# Patient Record
Sex: FEMALE | Race: WHITE | Employment: FULL TIME | URBAN - METROPOLITAN AREA
[De-identification: names, ages, dates, MRNs, and addresses within clinical notes are randomized per-mention and may not be internally consistent; named-entity substitution may affect disease eponyms.]

---

## 2016-01-11 LAB
CHOLEST SERPL-MCNC: 184 MG/DL
HDLC SERPL-MCNC: 37 MG/DL
LDLC SERPL CALC-MCNC: 121 MG/DL
TRIGL SERPL-MCNC: 131 MG/DL
TSH SERPL-ACNC: 3.18 (ref 0.27–4.2)

## 2016-07-31 LAB
ALT SERPL-CCNC: 10 U/L (ref 7–45)
AST SERPL-CCNC: 17 U/L (ref 8–43)
CREAT SERPL-MCNC: 0.4 MG/DL (ref 0.6–1.1)
GFR SERPL CREATININE-BSD FRML MDRD: >60 ML/MIN/1.73M2

## 2016-09-06 LAB
ALT SERPL-CCNC: 10 U/L (ref 7–45)
AST SERPL-CCNC: 16 U/L (ref 8–43)
CREAT SERPL-MCNC: 0.4 MG/DL (ref 0.6–1.1)
GFR SERPL CREATININE-BSD FRML MDRD: >60 ML/MIN/1.73M2

## 2016-10-04 LAB
ALT SERPL-CCNC: 11 U/L (ref 7–45)
AST SERPL-CCNC: 15 U/L (ref 8–43)
CREAT SERPL-MCNC: 0.5 MG/DL (ref 0.6–1.1)
GFR SERPL CREATININE-BSD FRML MDRD: >60 ML/MIN/1.73M2

## 2016-11-25 LAB
ALT SERPL-CCNC: 11 U/L (ref 7–45)
AST SERPL-CCNC: 34 U/L (ref 8–43)
CREAT SERPL-MCNC: 0.5 MG/DL (ref 0.6–1.1)
GFR SERPL CREATININE-BSD FRML MDRD: >60 ML/MIN/1.73M2

## 2017-01-10 ENCOUNTER — TRANSFERRED RECORDS (OUTPATIENT)
Dept: HEALTH INFORMATION MANAGEMENT | Facility: CLINIC | Age: 27
End: 2017-01-10

## 2017-06-12 LAB
ALT SERPL-CCNC: 10 U/L (ref 6–29)
AST SERPL-CCNC: 13 U/L (ref 10–30)
CREAT SERPL-MCNC: 0.64 MG/DL (ref 0.5–1.1)
GFR SERPL CREATININE-BSD FRML MDRD: 123 ML/MIN/1.73M2
GLUCOSE SERPL-MCNC: 83 MG/DL (ref 65–99)
POTASSIUM SERPL-SCNC: 4.5 MMOL/L (ref 3.5–5.3)

## 2017-08-04 ENCOUNTER — TRANSFERRED RECORDS (OUTPATIENT)
Dept: HEALTH INFORMATION MANAGEMENT | Facility: CLINIC | Age: 27
End: 2017-08-04

## 2017-08-04 LAB — PAP-ABSTRACT: NORMAL

## 2017-08-06 ENCOUNTER — TRANSFERRED RECORDS (OUTPATIENT)
Dept: HEALTH INFORMATION MANAGEMENT | Facility: CLINIC | Age: 27
End: 2017-08-06

## 2017-08-07 ENCOUNTER — TRANSFERRED RECORDS (OUTPATIENT)
Dept: HEALTH INFORMATION MANAGEMENT | Facility: CLINIC | Age: 27
End: 2017-08-07

## 2018-01-31 ENCOUNTER — TRANSFERRED RECORDS (OUTPATIENT)
Dept: HEALTH INFORMATION MANAGEMENT | Facility: CLINIC | Age: 28
End: 2018-01-31

## 2018-01-31 LAB
CREAT SERPL-MCNC: 0.63 MG/DL (ref 0.5–1.1)
GFR SERPL CREATININE-BSD FRML MDRD: 124 ML/MIN/1.73M2
GLUCOSE SERPL-MCNC: 84 MG/DL (ref 65–99)
POTASSIUM SERPL-SCNC: 4.7 MMOL/L (ref 3.5–5.3)

## 2018-04-03 ENCOUNTER — TRANSFERRED RECORDS (OUTPATIENT)
Dept: HEALTH INFORMATION MANAGEMENT | Facility: CLINIC | Age: 28
End: 2018-04-03

## 2018-04-03 LAB — TSH SERPL-ACNC: 1.41 MIU/L (ref 0.4–4.5)

## 2018-10-12 ENCOUNTER — OFFICE VISIT (OUTPATIENT)
Dept: FAMILY MEDICINE | Facility: CLINIC | Age: 28
End: 2018-10-12
Payer: COMMERCIAL

## 2018-10-12 VITALS
HEART RATE: 100 BPM | OXYGEN SATURATION: 97 % | TEMPERATURE: 97.6 F | SYSTOLIC BLOOD PRESSURE: 118 MMHG | RESPIRATION RATE: 24 BRPM | HEIGHT: 67 IN | DIASTOLIC BLOOD PRESSURE: 70 MMHG | WEIGHT: 228.4 LBS | BODY MASS INDEX: 35.85 KG/M2

## 2018-10-12 DIAGNOSIS — S93.401D SPRAIN OF RIGHT ANKLE, UNSPECIFIED LIGAMENT, SUBSEQUENT ENCOUNTER: ICD-10-CM

## 2018-10-12 DIAGNOSIS — E66.01 MORBID OBESITY (H): ICD-10-CM

## 2018-10-12 DIAGNOSIS — F41.9 ANXIETY: ICD-10-CM

## 2018-10-12 DIAGNOSIS — I10 HYPERTENSION, GOAL BELOW 140/90: Primary | ICD-10-CM

## 2018-10-12 PROCEDURE — 99203 OFFICE O/P NEW LOW 30 MIN: CPT | Performed by: NURSE PRACTITIONER

## 2018-10-12 RX ORDER — ESCITALOPRAM OXALATE 10 MG/1
10 TABLET ORAL DAILY
Qty: 90 TABLET | Refills: 1 | Status: SHIPPED | OUTPATIENT
Start: 2018-10-12 | End: 2019-03-05

## 2018-10-12 RX ORDER — LEVONORGESTREL/ETHIN.ESTRADIOL 0.1-0.02MG
TABLET ORAL
COMMUNITY
Start: 2018-04-20 | End: 2019-08-07

## 2018-10-12 RX ORDER — HYDROCHLOROTHIAZIDE 25 MG/1
25 TABLET ORAL DAILY
COMMUNITY
Start: 2017-08-04 | End: 2018-10-12

## 2018-10-12 RX ORDER — HYDROCHLOROTHIAZIDE 25 MG/1
25 TABLET ORAL DAILY
Qty: 90 TABLET | Refills: 1 | Status: SHIPPED | OUTPATIENT
Start: 2018-10-12 | End: 2019-03-05

## 2018-10-12 RX ORDER — ESCITALOPRAM OXALATE 20 MG/1
20 TABLET ORAL DAILY
COMMUNITY
Start: 2017-08-04 | End: 2018-10-12 | Stop reason: DRUGHIGH

## 2018-10-12 ASSESSMENT — ANXIETY QUESTIONNAIRES
5. BEING SO RESTLESS THAT IT IS HARD TO SIT STILL: SEVERAL DAYS
2. NOT BEING ABLE TO STOP OR CONTROL WORRYING: MORE THAN HALF THE DAYS
GAD7 TOTAL SCORE: 10
7. FEELING AFRAID AS IF SOMETHING AWFUL MIGHT HAPPEN: SEVERAL DAYS
6. BECOMING EASILY ANNOYED OR IRRITABLE: MORE THAN HALF THE DAYS
1. FEELING NERVOUS, ANXIOUS, OR ON EDGE: SEVERAL DAYS
IF YOU CHECKED OFF ANY PROBLEMS ON THIS QUESTIONNAIRE, HOW DIFFICULT HAVE THESE PROBLEMS MADE IT FOR YOU TO DO YOUR WORK, TAKE CARE OF THINGS AT HOME, OR GET ALONG WITH OTHER PEOPLE: SOMEWHAT DIFFICULT
3. WORRYING TOO MUCH ABOUT DIFFERENT THINGS: MORE THAN HALF THE DAYS

## 2018-10-12 ASSESSMENT — PATIENT HEALTH QUESTIONNAIRE - PHQ9: 5. POOR APPETITE OR OVEREATING: SEVERAL DAYS

## 2018-10-12 ASSESSMENT — PAIN SCALES - GENERAL: PAINLEVEL: NO PAIN (0)

## 2018-10-12 NOTE — Clinical Note
Please abstract the following data from this visit with this patient into the appropriate field in Epic:  Pap smear done on this date: 2016 (approximately), by this group: [facility], results were normal.

## 2018-10-12 NOTE — PROGRESS NOTES
SUBJECTIVE:   Marie Yeboah is a 28 year old female who presents to clinic today for the following health issues:      New Patient/Transfer of Care  Medication Followup of lexapro, HCTZ    Taking Medication as prescribed: Lexapro is taking 1/2 tablet (10mg) seems to help better. Noticing that with full tablet she experienced heart palpatations. hydrochlorothiazide doing well on    Side Effects:  See above    Medication Helping Symptoms:  yes       The patient is here today to establish care.  She has previously been seen at the [facility], is having medical records transferred here.  She was initially prescribed Lexapro 20 mg daily, but it caused heart palpitations.  She has only been taking half a tablet, states it works well for her anxiety management and she has no side effects.  She is taking hydrochlorothiazide for management of blood pressure and this works well.  She denies chest pain or palpitations, has had no problems with swelling of the feet or ankles  She believes her health maintenance is up-to-date, had a Pap smear last year  She stepped in a hole couple weeks ago and twisted her right ankle.  She keeps wrapped, still has some pain along the medial aspect of the midfoot.  She did have this evaluated in clinic, was negative for fracture    Problem list and histories reviewed & adjusted, as indicated.  Additional history: as documented    BP Readings from Last 3 Encounters:   10/12/18 118/70   10/31/01 100/60   08/04/01 90/60    Wt Readings from Last 3 Encounters:   10/12/18 228 lb 6.4 oz (103.6 kg)   08/20/02 90 lb (40.8 kg) (57 %)*   01/14/02 81 lb 8 oz (37 kg) (52 %)*     * Growth percentiles are based on CDC 2-20 Years data.                    Reviewed and updated as needed this visit by clinical staff  Tobacco  Allergies  Surg Hx  Fam Hx  Soc Hx      Reviewed and updated as needed this visit by Provider         ROS:  Constitutional, HEENT, cardiovascular, pulmonary, gi and gu systems are  "negative, except as otherwise noted.    OBJECTIVE:     /70  Pulse 100  Temp 97.6  F (36.4  C) (Temporal)  Resp 24  Ht 5' 7\" (1.702 m)  Wt 228 lb 6.4 oz (103.6 kg)  LMP 10/08/2018  SpO2 97%  BMI 35.77 kg/m2  Body mass index is 35.77 kg/(m^2).   GENERAL: healthy, alert and no distress  NECK: no adenopathy, no asymmetry, masses, or scars and thyroid normal to palpation  RESP: lungs clear to auscultation - no rales, rhonchi or wheezes  CV: regular rate and rhythm, normal S1 S2, no S3 or S4, no murmur, click or rub, no peripheral edema and peripheral pulses strong  ABDOMEN: soft, nontender, no hepatosplenomegaly, no masses and bowel sounds normal  MS: Right ankle and foot appear normal to visual inspection.  No edema or discoloration.  No malleoli or tenderness.  Full range of motion and strength of the joint.  He has tenderness in the soft tissue inferior to the medial malleolus    Potassium and creatinine were checked in January, both are within normal  limits    ASSESSMENT/PLAN:     Problem List Items Addressed This Visit        Medium    Hypertension, goal below 140/90 - Primary    Relevant Medications    hydrochlorothiazide (HYDRODIURIL) 25 MG tablet    Obesity (BMI 35.0-39.9) with comorbidity (H)      Other Visit Diagnoses     Anxiety        Relevant Medications    escitalopram (LEXAPRO) 10 MG tablet    Sprain of right ankle, unspecified ligament, subsequent encounter               Prescriptions refilled for Lexapro 10 mg and HCTZ 25 mg daily.  She would be due for lab work in January, will return to clinic at that time for recheck  Discussed alternating warm water soaks with application of ice to the ankle to promote healing.  Gentle flexibility exercises recommended.    KENDAL Bowen [facility]  "

## 2018-10-12 NOTE — MR AVS SNAPSHOT
"              After Visit Summary   10/12/2018    Marie Yeboah    MRN: [id number]           Patient Information     Date Of Birth          1990        Visit Information        Provider Department      10/12/2018 3:45 PM Jo Araujo APRN CNP [facility]        Today's Diagnoses     Hypertension, goal below 140/90    -  1    Anxiety        Morbid obesity (H)        Sprain of right ankle, unspecified ligament, subsequent encounter           Follow-ups after your visit        Who to contact     If you have questions or need follow up information about today's clinic visit or your schedule please contact [facility] directly at [phone].  Normal or non-critical lab and imaging results will be communicated to you by [employer], letter or phone within 4 business days after the clinic has received the results. If you do not hear from us within 7 days, please contact the clinic through [employer] or phone. If you have a critical or abnormal lab result, we will notify you by phone as soon as possible.  Submit refill requests through [employer] or call your pharmacy and they will forward the refill request to us. Please allow 3 business days for your refill to be completed.          Additional Information About Your Visit        MyChart Information     [employer] lets you send messages to your doctor, view your test results, renew your prescriptions, schedule appointments and more. To sign up, go to [email or web address] . Click on \"Log in\" on the left side of the screen, which will take you to the Welcome page. Then click on \"Sign up Now\" on the right side of the page.     You will be asked to enter the access code listed below, as well as some personal information. Please follow the directions to create your username and password.     Your access code is: [email or web address]  Expires: 1/10/2019  3:25 PM     Your access code will  in 90 days. If you need help or a new code, please call " "your [place] clinic or [phone].        Care EveryWhere ID     This is your Care EveryWhere ID. This could be used by other organizations to access your [place] medical records  [id number]        Your Vitals Were     Pulse Temperature Respirations Height Last Period Pulse Oximetry    100 97.6  F (36.4  C) (Temporal) 24 5' 7\" (1.702 m) 10/08/2018 97%    BMI (Body Mass Index)                   35.77 kg/m2            Blood Pressure from Last 3 Encounters:   10/12/18 118/70   10/31/01 100/60   08/04/01 90/60    Weight from Last 3 Encounters:   10/12/18 228 lb 6.4 oz (103.6 kg)   08/20/02 90 lb (40.8 kg) (57 %)*   01/14/02 81 lb 8 oz (37 kg) (52 %)*     * Growth percentiles are based on [facility] 2-20 Years data.              Today, you had the following     No orders found for display         Today's Medication Changes          These changes are accurate as of 10/12/18  4:20 PM.  If you have any questions, ask your nurse or doctor.               These medicines have changed or have updated prescriptions.        Dose/Directions    escitalopram 10 MG tablet   Commonly known as:  LEXAPRO   This may have changed:    - medication strength  - how much to take  - additional instructions   Used for:  Anxiety   Changed by:  Jo Araujo APRN CNP        Dose:  10 mg   Take 1 tablet (10 mg) by mouth daily   Quantity:  90 tablet   Refills:  1            Where to get your medicines      These medications were sent to [facility] [address] [place] [address] [address]  [address] [place] [address]     Phone:  [phone]     escitalopram 10 MG tablet    hydrochlorothiazide 25 MG tablet                Primary Care Provider Office Phone # Fax #    KENDAL Bowen [facility] [phone]       [address] [place] [address]        Equal Access to Services     ADRIAN GARDNER AH: Stephanie suazoo Soomaali, waaxda luqadaha, qaybta kaalmada adeegyada, alen brito. So wa " [phone].    ATENCIÓN: Si jodi castano, tiene a carlos disposición servicios gratuitos de asistencia lingüística. Krissy sosa [phone].    We comply with applicable federal civil rights laws and [place] laws. We do not discriminate on the basis of race, color, national origin, age, disability, sex, sexual orientation, or gender identity.            Thank you!     Thank you for choosing [facility]  for your care. Our goal is always to provide you with excellent care. Hearing back from our patients is one way we can continue to improve our services. Please take a few minutes to complete the written survey that you may receive in the mail after your visit with us. Thank you!             Your Updated Medication List - Protect others around you: Learn how to safely use, store and throw away your medicines at www.disposemymeds.org.          This list is accurate as of 10/12/18  4:20 PM.  Always use your most recent med list.                   Brand Name Dispense Instructions for use Diagnosis    escitalopram 10 MG tablet    LEXAPRO    90 tablet    Take 1 tablet (10 mg) by mouth daily    Anxiety       hydrochlorothiazide 25 MG tablet    HYDRODIURIL    90 tablet    Take 1 tablet (25 mg) by mouth daily    Hypertension, goal below 140/90       levonorgestrel-ethinyl estradiol 0.1-20 MG-MCG per tablet    DANITA CRYSTAL LESSINA     Take 1 tablet by mouth daily.

## 2018-10-13 ASSESSMENT — ANXIETY QUESTIONNAIRES: GAD7 TOTAL SCORE: 10

## 2018-10-14 ENCOUNTER — HEALTH MAINTENANCE LETTER (OUTPATIENT)
Age: 28
End: 2018-10-14

## 2018-10-23 ENCOUNTER — TELEPHONE (OUTPATIENT)
Dept: FAMILY MEDICINE | Facility: CLINIC | Age: 28
End: 2018-10-23

## 2018-10-23 NOTE — TELEPHONE ENCOUNTER
Please abstract the following data from this visit with this patient into the appropriate field in Epic:    Pap smear done on this date: 8/6/17 (approximately), by this group: [facility], results were neg, normal.

## 2019-02-19 ENCOUNTER — MYC MEDICAL ADVICE (OUTPATIENT)
Dept: FAMILY MEDICINE | Facility: CLINIC | Age: 29
End: 2019-02-19

## 2019-03-05 ENCOUNTER — OFFICE VISIT (OUTPATIENT)
Dept: FAMILY MEDICINE | Facility: CLINIC | Age: 29
End: 2019-03-05
Payer: COMMERCIAL

## 2019-03-05 VITALS
OXYGEN SATURATION: 97 % | DIASTOLIC BLOOD PRESSURE: 86 MMHG | TEMPERATURE: 97.9 F | RESPIRATION RATE: 18 BRPM | BODY MASS INDEX: 38.66 KG/M2 | HEART RATE: 100 BPM | SYSTOLIC BLOOD PRESSURE: 126 MMHG | WEIGHT: 246.3 LBS | HEIGHT: 67 IN

## 2019-03-05 DIAGNOSIS — I10 HYPERTENSION, GOAL BELOW 140/90: ICD-10-CM

## 2019-03-05 DIAGNOSIS — J06.9 VIRAL UPPER RESPIRATORY TRACT INFECTION: Primary | ICD-10-CM

## 2019-03-05 DIAGNOSIS — F41.9 ANXIETY: ICD-10-CM

## 2019-03-05 LAB
ANION GAP SERPL CALCULATED.3IONS-SCNC: 6 MMOL/L (ref 3–14)
BUN SERPL-MCNC: 21 MG/DL (ref 7–30)
CALCIUM SERPL-MCNC: 8.9 MG/DL (ref 8.5–10.1)
CHLORIDE SERPL-SCNC: 103 MMOL/L (ref 94–109)
CO2 SERPL-SCNC: 30 MMOL/L (ref 20–32)
CREAT SERPL-MCNC: 0.9 MG/DL (ref 0.52–1.04)
GFR SERPL CREATININE-BSD FRML MDRD: 86 ML/MIN/{1.73_M2}
GLUCOSE SERPL-MCNC: 92 MG/DL (ref 70–99)
POTASSIUM SERPL-SCNC: 3.6 MMOL/L (ref 3.4–5.3)
SODIUM SERPL-SCNC: 139 MMOL/L (ref 133–144)

## 2019-03-05 PROCEDURE — 99214 OFFICE O/P EST MOD 30 MIN: CPT | Performed by: NURSE PRACTITIONER

## 2019-03-05 PROCEDURE — 80048 BASIC METABOLIC PNL TOTAL CA: CPT | Performed by: NURSE PRACTITIONER

## 2019-03-05 PROCEDURE — 36415 COLL VENOUS BLD VENIPUNCTURE: CPT | Performed by: NURSE PRACTITIONER

## 2019-03-05 RX ORDER — ESCITALOPRAM OXALATE 10 MG/1
10 TABLET ORAL DAILY
Qty: 90 TABLET | Refills: 1 | Status: SHIPPED | OUTPATIENT
Start: 2019-03-05 | End: 2019-10-10 | Stop reason: DRUGHIGH

## 2019-03-05 RX ORDER — HYDROCHLOROTHIAZIDE 25 MG/1
25 TABLET ORAL DAILY
Qty: 90 TABLET | Refills: 1 | Status: SHIPPED | OUTPATIENT
Start: 2019-03-05 | End: 2019-10-10

## 2019-03-05 ASSESSMENT — ANXIETY QUESTIONNAIRES
GAD7 TOTAL SCORE: 11
6. BECOMING EASILY ANNOYED OR IRRITABLE: MORE THAN HALF THE DAYS
2. NOT BEING ABLE TO STOP OR CONTROL WORRYING: MORE THAN HALF THE DAYS
IF YOU CHECKED OFF ANY PROBLEMS ON THIS QUESTIONNAIRE, HOW DIFFICULT HAVE THESE PROBLEMS MADE IT FOR YOU TO DO YOUR WORK, TAKE CARE OF THINGS AT HOME, OR GET ALONG WITH OTHER PEOPLE: SOMEWHAT DIFFICULT
5. BEING SO RESTLESS THAT IT IS HARD TO SIT STILL: SEVERAL DAYS
7. FEELING AFRAID AS IF SOMETHING AWFUL MIGHT HAPPEN: MORE THAN HALF THE DAYS
1. FEELING NERVOUS, ANXIOUS, OR ON EDGE: SEVERAL DAYS
3. WORRYING TOO MUCH ABOUT DIFFERENT THINGS: MORE THAN HALF THE DAYS

## 2019-03-05 ASSESSMENT — PATIENT HEALTH QUESTIONNAIRE - PHQ9: 5. POOR APPETITE OR OVEREATING: SEVERAL DAYS

## 2019-03-05 ASSESSMENT — PAIN SCALES - GENERAL: PAINLEVEL: NO PAIN (0)

## 2019-03-05 ASSESSMENT — MIFFLIN-ST. JEOR: SCORE: 1879.84

## 2019-03-05 NOTE — PROGRESS NOTES
"  SUBJECTIVE:   Marie Yeboah is a 29 year old female who presents to clinic today for the following health issues:      Medication Followup of Lexapro, htcz    Taking Medication as prescribed: yes    Side Effects:  None    Medication Helping Symptoms:  yes       She is taking Lexapro 10 mg daily.  She denies symptoms of depression.  Is still quite anxious, but does not want to increase the dose of Lexapro as she did experience some side effects from that previously.  She feels is just the winter weather being inside, and is looking forward to spring.  Blood pressure is well managed with the HCTZ.  She denies issues with chest pain, shortness of breath or swelling of the feet and ankles  She has had a recent upper respiratory infection with some sense of chest congestion and cough.    Problem list and histories reviewed & adjusted, as indicated.  Additional history: as documented    BP Readings from Last 3 Encounters:   03/05/19 126/86   10/12/18 118/70   10/31/01 100/60    Wt Readings from Last 3 Encounters:   03/05/19 111.7 kg (246 lb 4.8 oz)   10/12/18 103.6 kg (228 lb 6.4 oz)   08/20/02 40.8 kg (90 lb) (57 %)*     * Growth percentiles are based on CDC (Girls, 2-20 Years) data.                    Reviewed and updated as needed this visit by clinical staff  Tobacco  Meds  Med Hx  Surg Hx  Fam Hx  Soc Hx      Reviewed and updated as needed this visit by Provider         ROS:  Constitutional, HEENT, cardiovascular, pulmonary, gi and gu systems are negative, except as otherwise noted.    OBJECTIVE:     /86   Pulse 100   Temp 97.9  F (36.6  C) (Temporal)   Resp 18   Ht 1.702 m (5' 7\")   Wt 111.7 kg (246 lb 4.8 oz)   SpO2 97%   BMI 38.58 kg/m    Body mass index is 38.58 kg/m .   GENERAL: healthy, alert and no distress  NECK: no adenopathy, no asymmetry, masses, or scars and thyroid normal to palpation  RESP: lungs clear to auscultation - no rales, rhonchi or wheezes  CV: regular rate and rhythm, " normal S1 S2, no S3 or S4, no murmur, click or rub, no peripheral edema and peripheral pulses strong    Diagnostic Test Results:  Results for orders placed or performed in visit on 03/05/19   Basic metabolic panel  (Ca, Cl, CO2, Creat, Gluc, K, Na, BUN)   Result Value Ref Range    Sodium 139 133 - 144 mmol/L    Potassium 3.6 3.4 - 5.3 mmol/L    Chloride 103 94 - 109 mmol/L    Carbon Dioxide 30 20 - 32 mmol/L    Anion Gap 6 3 - 14 mmol/L    Glucose 92 70 - 99 mg/dL    Urea Nitrogen 21 7 - 30 mg/dL    Creatinine 0.90 0.52 - 1.04 mg/dL    GFR Estimate 86 >60 mL/min/[1.73_m2]    GFR Estimate If Black >90 >60 mL/min/[1.73_m2]    Calcium 8.9 8.5 - 10.1 mg/dL       ASSESSMENT/PLAN:     Problem List Items Addressed This Visit        Medium    Hypertension, goal below 140/90    Relevant Medications    hydrochlorothiazide (HYDRODIURIL) 25 MG tablet    Other Relevant Orders    Basic metabolic panel  (Ca, Cl, CO2, Creat, Gluc, K, Na, BUN) (Completed)      Other Visit Diagnoses     Viral upper respiratory tract infection    -  Primary    Anxiety        Relevant Medications    escitalopram (LEXAPRO) 10 MG tablet         Blood pressure is within goal.  Continue HCTZ 25 mg daily.    Continue Lexapro 10 mg daily    Follow-up in clinic in 6 months    KENDAL Bowen [facility]

## 2019-03-06 ASSESSMENT — ANXIETY QUESTIONNAIRES: GAD7 TOTAL SCORE: 11

## 2019-03-08 ENCOUNTER — MYC MEDICAL ADVICE (OUTPATIENT)
Dept: FAMILY MEDICINE | Facility: CLINIC | Age: 29
End: 2019-03-08

## 2019-03-08 DIAGNOSIS — R09.89 CHEST CONGESTION: Primary | ICD-10-CM

## 2019-03-08 RX ORDER — ALBUTEROL SULFATE 90 UG/1
2 AEROSOL, METERED RESPIRATORY (INHALATION) EVERY 6 HOURS
Qty: 1 INHALER | Refills: 0 | Status: SHIPPED | OUTPATIENT
Start: 2019-03-08 | End: 2019-08-07

## 2019-03-08 NOTE — TELEPHONE ENCOUNTER
Marie Yeboah is a 29 year old female who calls with cough, congestion, chest tightness.    NURSING ASSESSMENT:  Description:  Patient states she is new to [facility], and was seen by provider on 3/5/19.  Patient states she has been using mucinex,  per provider, to help with congestion and cough.  She stated that her symptoms are not getting any better and she is having a difficult time taking deep breaths, coughing more often with 50/50 dry and wet cough (unknown as to color of sputum).  Patient stated she felt like this when she was diagnosed with bronchitis in the past and has used inhalers with antibiotics to help clear her chest congestion.  Patient denies fever, chest pain, earache, sore throat.  Advised patient to use warm steam, cool mist steam, Vicks vapor rub, and to continue mucinex.  Advised patient to seek emergency care for worsening symptoms.  Patient states understanding.  Will forward to provider for review and recommendation.    Onset/duration:  Over 10 days  Precip. factors:  Unknown  Associated symptoms:  Stated above  Improves/worsens symptoms:  Stated above  Pain scale (0-10)   0/10  Allergies:   Allergies   Allergen Reactions     Contrast Dye Anaphylaxis     Bee Venom      Other reaction(s): Hives  Other reaction(s): Hives     No Known Drug Allergies        NURSING PLAN: Routed to provider Yes    RECOMMENDED DISPOSITION:  Home care advice - routed to provider, ER for worsening symptoms  Will comply with recommendation: Yes  If further questions/concerns or if symptoms do not improve, worsen or new symptoms develop, call your PCP or [facility] as soon as possible.      Guideline used:  Telephone Triage Protocols for Nurses, Fifth Edition, Anna Marie Tucker RN

## 2019-03-08 NOTE — TELEPHONE ENCOUNTER
I will send prescription for an inhaler to [facility] pharmacy.  Continue Mucinex and humidification to open airways.  Her lungs sounded clear when I saw her 3 days ago.  If this is not getting any better in another 4-5 days, she should be rechecked in clinic.

## 2019-03-11 ENCOUNTER — OFFICE VISIT (OUTPATIENT)
Dept: FAMILY MEDICINE | Facility: OTHER | Age: 29
End: 2019-03-11
Payer: COMMERCIAL

## 2019-03-11 ENCOUNTER — ANCILLARY PROCEDURE (OUTPATIENT)
Dept: GENERAL RADIOLOGY | Facility: OTHER | Age: 29
End: 2019-03-11
Attending: PHYSICIAN ASSISTANT
Payer: COMMERCIAL

## 2019-03-11 VITALS
RESPIRATION RATE: 16 BRPM | SYSTOLIC BLOOD PRESSURE: 118 MMHG | TEMPERATURE: 97.6 F | HEART RATE: 80 BPM | WEIGHT: 244 LBS | BODY MASS INDEX: 38.22 KG/M2 | DIASTOLIC BLOOD PRESSURE: 70 MMHG | OXYGEN SATURATION: 98 %

## 2019-03-11 DIAGNOSIS — R05.9 COUGH: ICD-10-CM

## 2019-03-11 DIAGNOSIS — R05.9 COUGH: Primary | ICD-10-CM

## 2019-03-11 DIAGNOSIS — J20.9 ACUTE BRONCHITIS WITH SYMPTOMS > 10 DAYS: ICD-10-CM

## 2019-03-11 PROCEDURE — 71046 X-RAY EXAM CHEST 2 VIEWS: CPT | Mod: FY

## 2019-03-11 PROCEDURE — 99213 OFFICE O/P EST LOW 20 MIN: CPT | Performed by: PHYSICIAN ASSISTANT

## 2019-03-11 RX ORDER — PREDNISONE 20 MG/1
20 TABLET ORAL DAILY
Qty: 5 TABLET | Refills: 0 | Status: SHIPPED | OUTPATIENT
Start: 2019-03-11 | End: 2019-08-07

## 2019-03-11 RX ORDER — DOXYCYCLINE 100 MG/1
100 CAPSULE ORAL 2 TIMES DAILY
Qty: 20 CAPSULE | Refills: 0 | Status: SHIPPED | OUTPATIENT
Start: 2019-03-11 | End: 2019-08-07

## 2019-03-11 ASSESSMENT — PAIN SCALES - GENERAL: PAINLEVEL: NO PAIN (0)

## 2019-03-11 NOTE — PROGRESS NOTES
SUBJECTIVE:   Marie Yeboah is a 29 year old female who presents to clinic today for the following health issues:      HPI  Acute Illness   Acute illness concerns: URI  Onset: 2 weeks     Fever: no    Chills/Sweats: YES- chills     Headache (location?): YES    Sinus Pressure:YES, was in forehead this is better now    Conjunctivitis:  no    Ear Pain: no    Rhinorrhea: no    Congestion: YES    Sore Throat: no     Cough: YES-productive of yellow sputum, productive of green sputum, unable to get a good deep breath , sleeps more than usual    Wheeze: YES- some -- very minimal improvement    Decreased Appetite: YES- some     Nausea: no    Vomiting: no    Diarrhea:  no    Dysuria/Freq.: no    Fatigue/Achiness: YES- aches when first started but gone now but still really tired     Sick/Strep Exposure: no     Therapies Tried and outcome: mucinex, albuterol inhaler --neither medication is particularly helpful.      Problem list and histories reviewed & adjusted, as indicated.  Additional history: no history of asthma or lung disease        BP Readings from Last 3 Encounters:   03/11/19 118/70   03/05/19 126/86   10/12/18 118/70    Wt Readings from Last 3 Encounters:   03/11/19 110.7 kg (244 lb)   03/05/19 111.7 kg (246 lb 4.8 oz)   10/12/18 103.6 kg (228 lb 6.4 oz)                  Labs reviewed in EPIC    ROS:  CONSTITUTIONAL: NEGATIVE for fever, chills, change in weight  ENT/MOUTH: as above  RESP:as above  CV: NEGATIVE for chest pain, palpitations or peripheral edema  GI: NEGATIVE for nausea, abdominal pain, heartburn, or change in bowel habits    OBJECTIVE:     /70   Pulse 80   Temp 97.6  F (36.4  C) (Temporal)   Resp 16   Wt 110.7 kg (244 lb)   LMP  (LMP Unknown)   SpO2 98%   BMI 38.22 kg/m    Body mass index is 38.22 kg/m .  GENERAL: healthy, alert and no distress  HENT: ear canals and TM's normal, nose and mouth without ulcers or lesions  HENT: nasal mucosa edematous, no sinus tenderness to  palpation  NECK: no adenopathy, no asymmetry, masses, or scars and thyroid normal to palpation  RESP: lungs clear to auscultation - no rales, rhonchi or wheezes  RESP: good air movement, no wheeze at this time, frequent tight coughs  CV: regular rate and rhythm, normal S1 S2, no S3 or S4, no murmur, click or rub, no peripheral edema and peripheral pulses strong  MS: no gross musculoskeletal defects noted, no edema  PSYCH: mentation appears normal, affect normal/bright    Diagnostic Test Results:  CXR - no obviously infiltrate, per my read, rad report pending     ASSESSMENT/PLAN:             1. Cough    - XR Chest 2 Views; Future    2. Acute bronchitis with symptoms > 10 days  We will treat with antibiotics and prednisone burst at this time, if not improving she needs follow-up  - doxycycline hyclate (VIBRAMYCIN) 100 MG capsule; Take 1 capsule (100 mg) by mouth 2 times daily  Dispense: 20 capsule; Refill: 0  - predniSONE (DELTASONE) 20 MG tablet; Take 20 mg by mouth daily.  Dispense: 5 tablet; Refill: 0    This chart documentation was completed in part with Dragon voice recognition software.  Documentation is reviewed after completion, however, some words and grammatical errors may remain.  IRIS Borja PA-C  [facility]

## 2019-03-12 ENCOUNTER — OFFICE VISIT (OUTPATIENT)
Dept: OBGYN | Facility: CLINIC | Age: 29
End: 2019-03-12
Payer: COMMERCIAL

## 2019-03-12 VITALS
HEART RATE: 80 BPM | BODY MASS INDEX: 38.76 KG/M2 | WEIGHT: 247.5 LBS | DIASTOLIC BLOOD PRESSURE: 78 MMHG | SYSTOLIC BLOOD PRESSURE: 128 MMHG

## 2019-03-12 DIAGNOSIS — Z30.430 ENCOUNTER FOR INSERTION OF INTRAUTERINE CONTRACEPTIVE DEVICE: Primary | ICD-10-CM

## 2019-03-12 DIAGNOSIS — Z11.3 SCREEN FOR STD (SEXUALLY TRANSMITTED DISEASE): ICD-10-CM

## 2019-03-12 DIAGNOSIS — Z01.812 PRE-PROCEDURE LAB EXAM: ICD-10-CM

## 2019-03-12 LAB — HCG UR QL: NEGATIVE

## 2019-03-12 PROCEDURE — 81025 URINE PREGNANCY TEST: CPT | Performed by: OBSTETRICS & GYNECOLOGY

## 2019-03-12 PROCEDURE — 87591 N.GONORRHOEAE DNA AMP PROB: CPT | Performed by: OBSTETRICS & GYNECOLOGY

## 2019-03-12 PROCEDURE — 87491 CHLMYD TRACH DNA AMP PROBE: CPT | Performed by: OBSTETRICS & GYNECOLOGY

## 2019-03-12 PROCEDURE — 58300 INSERT INTRAUTERINE DEVICE: CPT | Performed by: OBSTETRICS & GYNECOLOGY

## 2019-03-12 SDOH — HEALTH STABILITY: MENTAL HEALTH: HOW OFTEN DO YOU HAVE A DRINK CONTAINING ALCOHOL?: 2-4 TIMES A MONTH

## 2019-03-12 NOTE — PROGRESS NOTES
IUD Insertion:  CONSULT:    Is a pregnancy test required: Yes.  Was it positive or negative?  Negative  Was a consent obtained?  Yes    Subjective: Marie Yeboah is a 29 year old  presents for IUD and desires Mirena type IUD. She has had both the Mirena and ParaGard in the past, liked the Mirena, had good menstrual suppression. She has had two pregnancies, both  delivery, one , both complicated by preeclampsia. She now has a HTN diagnosis. She does not desire future fertility, plans to use IUDs long term. She has no other concerns today.     The entire insertion procedure was reviewed with the patient, including care after placement.    Patient's last menstrual period was 2019. No allergy to betadine or shellfish. Patient desires STD screening  HCG Qual Urine   Date Value Ref Range Status   2019 Negative NEG^Negative Final     Comment:     This test is for screening purposes.  Results should be interpreted along with   the clinical picture.  Confirmation testing is available if warranted by   ordering [id number], HCG Quantitative Pregnancy.           /78 (BP Location: Right arm, Cuff Size: Adult Regular)   Pulse 80   Wt 112.3 kg (247 lb 8 oz)   LMP 2019   BMI 38.76 kg/m       Breasts: no axillary adenopathy, no dominant masses, no skin changes, no nipple discharge, nontender  Abdomen: soft, non tender, non distended, no masses, no hernias. No inguinal lymphadenopathy.     Pelvic Exam:   EG/BUS: normal genital architecture without lesions, erythema or abnormal secretions.   Vagina: moist, pink, rugae with physiologic discharge and secretions  Cervix: nulliparous no lesions and pink, moist, closed, without lesion or CMT  Uterus: anteverted position, mobile, no pain  Adnexa: within normal limits and no masses, nodularity, tenderness    PROCEDURE NOTE: -- IUD Insertion    Reason for Insertion: contraception    Premedicated with ibuprofen.  Under sterile technique, cervix  was visualized with speculum and prepped with Betadine solution swab x 3. Tenaculum was placed for stability. The uterus was gently straightened and sounded to 7.5 cm. IUD prepared for placement, and IUD inserted according to 's instructions without difficulty or significant resitance, and deployed at the fundus. The strings were visualized and trimmed to 2.5 cm from the external os. Tenaculum was removed and hemostasis noted. Speculum removed.  Patient tolerated procedure well.    Lot # [id number]  Exp: Jun 2021    EBL: minimal    Complications: none    ASSESSMENT:     ICD-10-CM    1. Pre-procedure lab exam Z01.812 HCG qualitative urine        PLAN:    Given 's handouts, including when to have IUD removed, list of danger s/sx, side effects and follow up recommended. Encouraged condom use for prevention of STD. Back up contraception advised for 7 days if progestin method. Advised to call for any fever, for prolonged or severe pain or bleeding, abnormal vaginal discharge, or unable to palpate strings. She was advised to use pain medications (ibuprofen) as needed for mild to moderate pain. Advised to follow-up in clinic in 4-6 weeks for IUD string check if unable to find strings or as directed by provider.     Mauricio Barbosa MD  March 12, 2019 8:32 AM

## 2019-03-13 LAB
C TRACH DNA SPEC QL NAA+PROBE: NEGATIVE
N GONORRHOEA DNA SPEC QL NAA+PROBE: NEGATIVE
SPECIMEN SOURCE: NORMAL
SPECIMEN SOURCE: NORMAL

## 2019-07-28 ENCOUNTER — APPOINTMENT (OUTPATIENT)
Dept: GENERAL RADIOLOGY | Facility: CLINIC | Age: 29
End: 2019-07-28
Attending: FAMILY MEDICINE
Payer: COMMERCIAL

## 2019-07-28 ENCOUNTER — HOSPITAL ENCOUNTER (EMERGENCY)
Facility: CLINIC | Age: 29
Discharge: HOME | End: 2019-07-28
Attending: FAMILY MEDICINE | Admitting: FAMILY MEDICINE
Payer: COMMERCIAL

## 2019-07-28 VITALS
OXYGEN SATURATION: 97 % | DIASTOLIC BLOOD PRESSURE: 97 MMHG | WEIGHT: 240 LBS | SYSTOLIC BLOOD PRESSURE: 151 MMHG | RESPIRATION RATE: 16 BRPM | HEART RATE: 89 BPM | TEMPERATURE: 98.5 F | HEIGHT: 67 IN | BODY MASS INDEX: 37.67 KG/M2

## 2019-07-28 DIAGNOSIS — S92.512A CLOSED DISPLACED FRACTURE OF PROXIMAL PHALANX OF LESSER TOE OF LEFT FOOT, INITIAL ENCOUNTER: ICD-10-CM
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
## 2019-07-28 DIAGNOSIS — S20.219A CONTUSION OF CHEST WALL, UNSPECIFIED LATERALITY, INITIAL ENCOUNTER: ICD-10-CM

## 2019-07-28 PROCEDURE — 99284 EMERGENCY DEPT VISIT MOD MDM: CPT | Mod: 25 | Performed by: FAMILY MEDICINE

## 2019-07-28 PROCEDURE — 28515 TREATMENT OF TOE FRACTURE: CPT | Mod: T9 | Performed by: FAMILY MEDICINE

## 2019-07-28 PROCEDURE — 26725 TREAT FINGER FRACTURE EACH: CPT | Mod: T9 | Performed by: FAMILY MEDICINE

## 2019-07-28 PROCEDURE — 73630 X-RAY EXAM OF FOOT: CPT | Mod: TC,RT

## 2019-07-28 PROCEDURE — 99283 EMERGENCY DEPT VISIT LOW MDM: CPT | Mod: 25 | Performed by: FAMILY MEDICINE

## 2019-07-28 ASSESSMENT — MIFFLIN-ST. JEOR: SCORE: 1851.26
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
## 2019-07-28 NOTE — ED AVS SNAPSHOT
[facility] Emergency Department  [address] [place] [address]  Phone:  [phone]  Fax:  [phone]                                    Marei Yeboah   MRN: [id number]    Department:  [facility] Emergency Department   Date of Visit:  7/28/2019           After Visit Summary Signature Page    I have received my discharge instructions, and my questions have been answered. I have discussed any challenges I see with this plan with the nurse or doctor.    ..........................................................................................................................................  Patient/Patient Representative Signature      ..........................................................................................................................................  Patient Representative Print Name and Relationship to Patient    ..................................................               ................................................  Date                                   Time    ..........................................................................................................................................  Reviewed by Signature/Title    ...................................................              ..............................................  Date                                               Time          22EPIC Rev 08/18
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
## 2019-07-28 NOTE — LETTER
July 28, 2019      To Whom It May Concern:      Marie Yeboah was seen in our Emergency Department today, 07/28/19.  I expect her condition to improve over the next 7 days.  She may return to work  when improved.  She may need to be on crutches.  She can work as pain allows.    Sincerely,        Shant Clark MD

## 2019-07-28 NOTE — DISCHARGE INSTRUCTIONS
Leave the splint on.  Crutches and nonweightbearing when up.  Stay off it is much as possible the first 2 days and elevate it above your chest to minimize swelling.  Follow-up next week with orthopedics.  Expect some generalized aches and pains and a little muscle stiffness and soreness to your neck.  Anything more severe needs reevaluation.  Take Tylenol or ibuprofen for discomfort.

## 2019-07-30 ENCOUNTER — ANCILLARY PROCEDURE (OUTPATIENT)
Dept: GENERAL RADIOLOGY | Facility: OTHER | Age: 29
End: 2019-07-30
Attending: PODIATRIST
Payer: COMMERCIAL

## 2019-07-30 ENCOUNTER — OFFICE VISIT (OUTPATIENT)
Dept: PODIATRY | Facility: OTHER | Age: 29
End: 2019-07-30
Payer: COMMERCIAL

## 2019-07-30 VITALS
BODY MASS INDEX: 38.77 KG/M2 | HEIGHT: 67 IN | SYSTOLIC BLOOD PRESSURE: 136 MMHG | DIASTOLIC BLOOD PRESSURE: 86 MMHG | WEIGHT: 247 LBS

## 2019-07-30 DIAGNOSIS — M76.821 POSTERIOR TIBIAL TENDONITIS, RIGHT: ICD-10-CM

## 2019-07-30 DIAGNOSIS — S92.511A CLOSED DISPLACED FRACTURE OF PROXIMAL PHALANX OF LESSER TOE OF RIGHT FOOT, INITIAL ENCOUNTER: Primary | ICD-10-CM

## 2019-07-30 DIAGNOSIS — S92.511A CLOSED DISPLACED FRACTURE OF PROXIMAL PHALANX OF LESSER TOE OF RIGHT FOOT, INITIAL ENCOUNTER: ICD-10-CM

## 2019-07-30 DIAGNOSIS — Q74.2 ACCESSORY NAVICULAR BONE OF RIGHT FOOT: ICD-10-CM

## 2019-07-30 PROCEDURE — 73630 X-RAY EXAM OF FOOT: CPT | Mod: RT

## 2019-07-30 PROCEDURE — 99203 OFFICE O/P NEW LOW 30 MIN: CPT | Performed by: PODIATRIST

## 2019-07-30 ASSESSMENT — PAIN SCALES - GENERAL: PAINLEVEL: MODERATE PAIN (4)

## 2019-07-30 ASSESSMENT — MIFFLIN-ST. JEOR: SCORE: 1883.01

## 2019-07-30 NOTE — PATIENT INSTRUCTIONS
Reliable shoe stores: To maximize your experience and provide the best possible fit.  Be sure to show them your foot concerns and tell them Dr. Mann sent you.      Stores listed in bold have only athletic shoes, and stores that are not bold are mostly casual or variety of shoes    [employer]  [address] [place] [address]  [phone]    [employer] [place]  [address] [place] [address]  [phone]     [employer]  [address] [place] [address]  [phone]    [employer]  [address] [facility] [address]  [phone]    [employer]  [address] [place], [place] [address] [phone]    [employer]  [address] [place] [address]  [phone]                         [employer] Locations:     [address] [place] [address]   [phone]     [address] [place] [address] [address] [place] [address]   [phone]     [address] [place] [address]   [phone]     [address] [place] [address]   [phone]     [address] [place] [address]   [phone]     [address] [place] [address]   [phone]     [address] [facility] [address]   [place] [address]   [phone]     [address] [facility]   [place] [address]   [phone]             How to find reasonable shoes          The correct width    Correct Fitting    Correct Length      Foot Distortion    Posture Distortion                          Torsional Rigidity      Grasp behind the heel and underneath the foot and twist      Bad    Excessive torsion/twist in midfoot     Less torsion/twist in midfoot is better                   Heel Counter Rigidity      Grasp just above   midsole and squeeze      Bad    Soft heel counter      Good    Rigid Heel Counter      Flexion Rigidity      Grasp shoe and bend from forefoot to rearfoot
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
## 2019-07-30 NOTE — LETTER
[address] [address]  [phone]    2019      RE:  Marie Yeboah  : 1990      To whom it may concern:    This patient must be released from work 19 through 19.  She may walk and ambulate as tolerated in a post op shoe or fracture boot for 6-8 weeks after this injury.     Sincerely,          Torey Mann DPM
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
## 2019-08-02 ENCOUNTER — THERAPY VISIT (OUTPATIENT)
Dept: CHIROPRACTIC MEDICINE | Facility: CLINIC | Age: 29
End: 2019-08-02
Payer: COMMERCIAL

## 2019-08-02 DIAGNOSIS — M99.04 SEGMENTAL DYSFUNCTION OF SACRAL REGION: ICD-10-CM

## 2019-08-02 DIAGNOSIS — M99.03 SEGMENTAL DYSFUNCTION OF LUMBAR REGION: ICD-10-CM

## 2019-08-02 DIAGNOSIS — S13.4XXA SPRAIN OF LIGAMENTS OF CERVICAL SPINE: ICD-10-CM

## 2019-08-02 DIAGNOSIS — M99.02 SEGMENTAL DYSFUNCTION OF THORACIC REGION: ICD-10-CM

## 2019-08-02 DIAGNOSIS — M62.838 MUSCLE SPASM: ICD-10-CM

## 2019-08-02 DIAGNOSIS — M99.01 SEGMENTAL DYSFUNCTION OF CERVICAL REGION: Primary | ICD-10-CM

## 2019-08-02 PROCEDURE — 97110 THERAPEUTIC EXERCISES: CPT | Performed by: CHIROPRACTOR

## 2019-08-02 PROCEDURE — 98941 CHIROPRACT MANJ 3-4 REGIONS: CPT | Mod: AT | Performed by: CHIROPRACTOR

## 2019-08-02 PROCEDURE — 99203 OFFICE O/P NEW LOW 30 MIN: CPT | Mod: 25 | Performed by: CHIROPRACTOR

## 2019-08-02 PROCEDURE — 97035 APP MDLTY 1+ULTRASOUND EA 15: CPT | Performed by: CHIROPRACTOR
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
## 2019-08-05 ENCOUNTER — THERAPY VISIT (OUTPATIENT)
Dept: CHIROPRACTIC MEDICINE | Facility: CLINIC | Age: 29
End: 2019-08-05
Payer: COMMERCIAL

## 2019-08-05 DIAGNOSIS — M62.838 MUSCLE SPASM: ICD-10-CM

## 2019-08-05 DIAGNOSIS — M99.02 SEGMENTAL DYSFUNCTION OF THORACIC REGION: ICD-10-CM

## 2019-08-05 DIAGNOSIS — M99.04 SEGMENTAL DYSFUNCTION OF SACRAL REGION: ICD-10-CM

## 2019-08-05 DIAGNOSIS — M99.03 SEGMENTAL DYSFUNCTION OF LUMBAR REGION: ICD-10-CM

## 2019-08-05 DIAGNOSIS — S13.4XXA SPRAIN OF LIGAMENTS OF CERVICAL SPINE: ICD-10-CM

## 2019-08-05 DIAGNOSIS — M99.01 SEGMENTAL DYSFUNCTION OF CERVICAL REGION: Primary | ICD-10-CM

## 2019-08-05 PROCEDURE — 98941 CHIROPRACT MANJ 3-4 REGIONS: CPT | Mod: AT | Performed by: CHIROPRACTOR

## 2019-08-05 PROCEDURE — 97035 APP MDLTY 1+ULTRASOUND EA 15: CPT | Performed by: CHIROPRACTOR

## 2019-08-05 NOTE — PROGRESS NOTES
Visit #:  2 of 12 based on treatment plan 8/2/2019    Subjective:  Marie Yeboah is a 29 year old female who is seen in f/u up for:        Segmental dysfunction of cervical region  Segmental dysfunction of thoracic region  Segmental dysfunction of lumbar region  Segmental dysfunction of sacral region  Sprain of ligaments of cervical spine  Muscle spasm.     Since last visit on 8/2/2019,  Marie Yeboah reports the following changes: Patient presents and states that she did well after her first adjustment, she was a little sore afterwards, felt better, but then it got sore again last night. She got 3 days of improvement! She has more flexibility in her neck this morning. She rates her current pain 4/10. Her toe was feeling better until one of her kids stepped on it last night.        Objective:  The following was observed:    P: palpatory tenderness    A: static palpation demonstrates intersegmental asymmetry     R: motion palpation notes restricted motion    T: localized muscle spasm at: Sub-occipital and Traps L>>R      Assessment:    Segmental spinal dysfunction/restrictions found at:  C2   T1   T7  T10  L4  PSIS Right    Diagnoses:      1. Segmental dysfunction of cervical region    2. Segmental dysfunction of thoracic region    3. Segmental dysfunction of lumbar region    4. Segmental dysfunction of sacral region    5. Sprain of ligaments of cervical spine    6. Muscle spasm        Patient's condition:  Patient had restrictions pre-manipulation and Patient had decreased motion prior to manipulation    Treatment effectiveness:  Post manipulation there is better intersegmental movement and Patient claims to feel looser post manipulation      Procedures:  CMT:  28466 Chiropractic manipulative treatment 3-4 regions performed   Cervical: Diversified, C2, Supine  Thoracic: Diversified, T1, T7, T10, Prone  Lumbar: Drop Table, L4, Prone  Pelvis: Drop Table, PSIS Right , Prone    Modalities:  27372: US:  1.0 Mahmood/cm  squared for 8 minutes at mhz continuous pulsed, left upper traps and suboccipitals    Therapeutic procedures:Gave patient Ice instructions post adjustment, and instructions for acute care      Prognosis: Good    Progress towards Goals: Patient is making progress towards the goal of:  Decrease pain in neck and upper back from 7/10 to 3/10 in 4 treatments.  Reduce functional impairment in Neck Disability from 58% to 25% in 4 treatments.  Reduce intensity and frequency of headache.  Return to work.        Response to Treatment:   Reduction of symptoms with first treatment. Patient returned to work today.       Recommendations:    Instructions:ice 20 minutes every other hour as needed    Follow-up:  Return to care Friday.

## 2019-08-06 ENCOUNTER — MYC MEDICAL ADVICE (OUTPATIENT)
Dept: PODIATRY | Facility: CLINIC | Age: 29
End: 2019-08-06

## 2019-08-06 NOTE — TELEPHONE ENCOUNTER
Spoke patient and she has been wearing her post op shoe not her fracture boot. We talked about 1/2 finishing this week with 1/2 days. Recommended that she wear her fracture boot when she gets home and to work until she is pain free for 3 days, 24/7. She was in agreement with this plan. She will let us know tomorrow, 8/7/2019, how she is doing and if she wants to finish out the week with 1/2 days with fracture boot. She appreciated the suggestions. Anh Ayala CMA, August 6, 2019

## 2019-08-07 ENCOUNTER — OFFICE VISIT (OUTPATIENT)
Dept: FAMILY MEDICINE | Facility: CLINIC | Age: 29
End: 2019-08-07
Payer: COMMERCIAL

## 2019-08-07 VITALS
TEMPERATURE: 97.4 F | HEART RATE: 102 BPM | RESPIRATION RATE: 20 BRPM | DIASTOLIC BLOOD PRESSURE: 82 MMHG | OXYGEN SATURATION: 99 % | BODY MASS INDEX: 39.63 KG/M2 | SYSTOLIC BLOOD PRESSURE: 130 MMHG | WEIGHT: 253 LBS
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
## 2019-08-07 DIAGNOSIS — S80.02XA CONTUSION OF LEFT KNEE, INITIAL ENCOUNTER: Primary | ICD-10-CM

## 2019-08-07 PROCEDURE — 99214 OFFICE O/P EST MOD 30 MIN: CPT | Performed by: NURSE PRACTITIONER

## 2019-08-07 ASSESSMENT — PAIN SCALES - GENERAL: PAINLEVEL: MILD PAIN (3)

## 2019-08-07 NOTE — TELEPHONE ENCOUNTER
Spoke to patient and she is doing much better after switching from her post op shoe to fracture boot. Anh Ayala CMA, August 7, 2019
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
## 2019-08-08 ENCOUNTER — HOSPITAL ENCOUNTER (OUTPATIENT)
Dept: MRI IMAGING | Facility: CLINIC | Age: 29
Discharge: HOME | End: 2019-08-08
Attending: NURSE PRACTITIONER | Admitting: NURSE PRACTITIONER
Payer: COMMERCIAL

## 2019-08-08 ENCOUNTER — HOSPITAL ENCOUNTER (OUTPATIENT)
Dept: GENERAL RADIOLOGY | Facility: CLINIC | Age: 29
Discharge: HOME | End: 2019-08-08
Attending: NURSE PRACTITIONER | Admitting: NURSE PRACTITIONER
Payer: COMMERCIAL
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
## 2019-08-08 DIAGNOSIS — S80.02XA CONTUSION OF LEFT KNEE, INITIAL ENCOUNTER: ICD-10-CM

## 2019-08-08 PROCEDURE — 73562 X-RAY EXAM OF KNEE 3: CPT | Mod: TC

## 2019-08-08 PROCEDURE — 73721 MRI JNT OF LWR EXTRE W/O DYE: CPT | Mod: LT

## 2019-08-09 ENCOUNTER — THERAPY VISIT (OUTPATIENT)
Dept: CHIROPRACTIC MEDICINE | Facility: CLINIC | Age: 29
End: 2019-08-09
Payer: COMMERCIAL

## 2019-08-09 DIAGNOSIS — M62.838 MUSCLE SPASM: ICD-10-CM

## 2019-08-09 DIAGNOSIS — S13.4XXA SPRAIN OF LIGAMENTS OF CERVICAL SPINE: ICD-10-CM

## 2019-08-09 DIAGNOSIS — M99.02 SEGMENTAL DYSFUNCTION OF THORACIC REGION: ICD-10-CM

## 2019-08-09 DIAGNOSIS — M99.03 SEGMENTAL DYSFUNCTION OF LUMBAR REGION: ICD-10-CM

## 2019-08-09 DIAGNOSIS — M99.01 SEGMENTAL DYSFUNCTION OF CERVICAL REGION: Primary | ICD-10-CM

## 2019-08-09 DIAGNOSIS — M99.04 SEGMENTAL DYSFUNCTION OF SACRAL REGION: ICD-10-CM

## 2019-08-09 PROCEDURE — 97035 APP MDLTY 1+ULTRASOUND EA 15: CPT | Performed by: CHIROPRACTOR

## 2019-08-09 PROCEDURE — 98941 CHIROPRACT MANJ 3-4 REGIONS: CPT | Mod: AT | Performed by: CHIROPRACTOR

## 2019-08-09 NOTE — PROGRESS NOTES
Visit #:  3 of 12 based on treatment plan 8/2/2019    Subjective:  Marie Yeboah is a 29 year old female who is seen in f/u up for:        Segmental dysfunction of cervical region  Segmental dysfunction of thoracic region  Segmental dysfunction of lumbar region  Segmental dysfunction of sacral region  Sprain of ligaments of cervical spine  Muscle spasm.     Since last visit on 8/5/2019,  Marie Yeboah reports the following changes: Patient presents and states that she is doing better but mornings are really tough, her pain is worse in the mornings. She rates her current pain 2/10.  Her ROM has increased. She feels better throughout the day.       Objective:  The following was observed:    P: palpatory tenderness    A: static palpation demonstrates intersegmental asymmetry     R: motion palpation notes restricted motion    T: localized muscle spasm at: Sub-occipital and Traps L>>R      Assessment:    Segmental spinal dysfunction/restrictions found at:  C2 RR, LRR  T1 RR, LRR  T7 E, FR  T10 E, FR  L4 LR, RRR  Right SI posterior    Diagnoses:      1. Segmental dysfunction of cervical region    2. Segmental dysfunction of thoracic region    3. Segmental dysfunction of lumbar region    4. Segmental dysfunction of sacral region    5. Sprain of ligaments of cervical spine    6. Muscle spasm        Patient's condition:  Patient had restrictions pre-manipulation and Patient had decreased motion prior to manipulation    Treatment effectiveness:  Post manipulation there is better intersegmental movement and Patient claims to feel looser post manipulation      Procedures:  CMT:  77393 Chiropractic manipulative treatment 3-4 regions performed   Cervical: Diversified, C2, Supine  Thoracic: Diversified, T1, T7, T10, Prone  Lumbar: Drop Table, L4, Prone  Pelvis: Drop Table, PSIS Right , Prone    Modalities:  25748: US:  1.0 Mahmood/cm squared for 8 minutes at mhz continuous pulsed, left upper traps and suboccipitals    Therapeutic  procedures:Gave patient Ice instructions post adjustment, and instructions for acute care      Prognosis: Good    Progress towards Goals: Patient is making progress towards the goal of:  Decrease pain in neck and upper back from 7/10 to 3/10 in 4 treatments.  Reduce functional impairment in Neck Disability from 58% to 25% in 4 treatments.  Reduce intensity and frequency of headache.  Return to work.        Response to Treatment:   Patient tolerated treatment well today.       Recommendations:    Instructions:ice 20 minutes every other hour as needed    Follow-up:  Return to care next week.

## 2019-08-12 ENCOUNTER — THERAPY VISIT (OUTPATIENT)
Dept: CHIROPRACTIC MEDICINE | Facility: CLINIC | Age: 29
End: 2019-08-12
Payer: COMMERCIAL

## 2019-08-12 DIAGNOSIS — M99.03 SEGMENTAL DYSFUNCTION OF LUMBAR REGION: ICD-10-CM

## 2019-08-12 DIAGNOSIS — M62.838 MUSCLE SPASM: ICD-10-CM

## 2019-08-12 DIAGNOSIS — M99.01 SEGMENTAL DYSFUNCTION OF CERVICAL REGION: Primary | ICD-10-CM

## 2019-08-12 DIAGNOSIS — M99.02 SEGMENTAL DYSFUNCTION OF THORACIC REGION: ICD-10-CM

## 2019-08-12 DIAGNOSIS — M99.04 SEGMENTAL DYSFUNCTION OF SACRAL REGION: ICD-10-CM

## 2019-08-12 DIAGNOSIS — S13.4XXA SPRAIN OF LIGAMENTS OF CERVICAL SPINE: ICD-10-CM

## 2019-08-12 PROCEDURE — 97035 APP MDLTY 1+ULTRASOUND EA 15: CPT | Performed by: CHIROPRACTOR

## 2019-08-12 PROCEDURE — 98941 CHIROPRACT MANJ 3-4 REGIONS: CPT | Mod: AT | Performed by: CHIROPRACTOR

## 2019-08-12 NOTE — PROGRESS NOTES
Visit #:  4 of 12 based on treatment plan 8/2/2019    Subjective:  Marie Yeboah is a 29 year old female who is seen in f/u up for:        Segmental dysfunction of cervical region  Segmental dysfunction of thoracic region  Segmental dysfunction of lumbar region  Segmental dysfunction of sacral region  Sprain of ligaments of cervical spine  Muscle spasm.     Since last visit on 8/9/2019,  Marie Yeboah reports the following changes: Patient presents and states that she is doing about the same as last time. She does have pain from camping this weekend. Her pain is rated 3/10 currently. She does feel like she has more mobility.      Objective:  The following was observed:    P: palpatory tenderness    A: static palpation demonstrates intersegmental asymmetry     R: motion palpation notes restricted motion    T: localized muscle spasm at: Sub-occipital and Traps L>>R      Assessment:    Segmental spinal dysfunction/restrictions found at:  C2 RR, LRR  T1 RR, LRR  T7 E, FR  T10 E, FR  L4 LR, RRR  Right SI posterior    Diagnoses:      1. Segmental dysfunction of cervical region    2. Segmental dysfunction of thoracic region    3. Segmental dysfunction of lumbar region    4. Segmental dysfunction of sacral region    5. Sprain of ligaments of cervical spine    6. Muscle spasm        Patient's condition:  Patient had restrictions pre-manipulation and Patient had decreased motion prior to manipulation    Treatment effectiveness:  Post manipulation there is better intersegmental movement and Patient claims to feel looser post manipulation      Procedures:  CMT:  05236 Chiropractic manipulative treatment 3-4 regions performed   Cervical: Diversified, C2, Supine  Thoracic: Diversified, T1, T7, T10, Prone  Lumbar: Drop Table, L4, Prone  Pelvis: Drop Table, PSIS Right , Prone    Modalities:  76245: US:  1.0 Mahmood/cm squared for 8 minutes at mhz continuous pulsed, left upper traps and suboccipitals    Therapeutic procedures:Gave  "patient Ice instructions post adjustment, and instructions for acute care      Prognosis: Good    Progress towards Goals: Patient is making progress towards the goal of:  Decrease pain in neck and upper back from 7/10 to 3/10 in 4 treatments.  Reduce functional impairment in Neck Disability from 58% to 25% in 4 treatments.  Reduce intensity and frequency of headache.  Return to work.        Response to Treatment:   Patient tolerated treatment well today.       Recommendations:    Instructions:ice 20 minutes every other hour as needed    Follow-up:  Return to care next week.     Patient messaged me on [employer] messaging system as she is an employee here. At about 3:45, she asked if it was normal to feel dizzy and get nauseas after an adjustment. I noted that typically an adjustment would help with those issues. She went back to her clinic after her adjustment, and about an hour later got sweaty, dizzy and vomited. She denies trouble swallowing, speaking, numbness, visual changes, or vertigo. She does have a slight headache. She felt better afterwards but her  is coming to pick her up and drive her home. I offered for her to come back down and we could apply cold compress, [employer] Gel, rub her neck, she elected to go home. Patient likely encountered a vasovagal response, from the effects on the nervous system. Patient did seem more hypertonic today, perhaps from sleeping in camper over the weekend, but all adjustments were made with ease. 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 I think her response was a combination of these things. Patient was advised that if any of the symptoms persisted or additional symptoms that were listed become apparent, to report to the ED. Patient understood and agreed.       "

## 2019-08-15 ENCOUNTER — OFFICE VISIT (OUTPATIENT)
Dept: PODIATRY | Facility: CLINIC | Age: 29
End: 2019-08-15
Payer: COMMERCIAL

## 2019-08-15 ENCOUNTER — ANCILLARY PROCEDURE (OUTPATIENT)
Dept: GENERAL RADIOLOGY | Facility: CLINIC | Age: 29
End: 2019-08-15
Attending: PODIATRIST
Payer: COMMERCIAL

## 2019-08-15 VITALS
DIASTOLIC BLOOD PRESSURE: 90 MMHG | HEIGHT: 67 IN | BODY MASS INDEX: 39.71 KG/M2 | WEIGHT: 253 LBS | SYSTOLIC BLOOD PRESSURE: 134 MMHG

## 2019-08-15 DIAGNOSIS — M76.821 POSTERIOR TIBIAL TENDONITIS, RIGHT: ICD-10-CM

## 2019-08-15 DIAGNOSIS — S92.511A CLOSED DISPLACED FRACTURE OF PROXIMAL PHALANX OF LESSER TOE OF RIGHT FOOT, INITIAL ENCOUNTER: ICD-10-CM

## 2019-08-15 DIAGNOSIS — Q74.2 ACCESSORY NAVICULAR BONE OF RIGHT FOOT: ICD-10-CM

## 2019-08-15 DIAGNOSIS — S92.511A CLOSED DISPLACED FRACTURE OF PROXIMAL PHALANX OF LESSER TOE OF RIGHT FOOT, INITIAL ENCOUNTER: Primary | ICD-10-CM

## 2019-08-15 PROCEDURE — 73630 X-RAY EXAM OF FOOT: CPT | Mod: TC

## 2019-08-15 PROCEDURE — 99207 ZZC FRACTURE CARE IN GLOBAL PERIOD: CPT | Performed by: PODIATRIST

## 2019-08-15 ASSESSMENT — PAIN SCALES - GENERAL: PAINLEVEL: MODERATE PAIN (4)

## 2019-08-15 ASSESSMENT — MIFFLIN-ST. JEOR: SCORE: 1910.23
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
## 2019-08-21 ENCOUNTER — THERAPY VISIT (OUTPATIENT)
Dept: CHIROPRACTIC MEDICINE | Facility: CLINIC | Age: 29
End: 2019-08-21
Payer: COMMERCIAL

## 2019-08-21 DIAGNOSIS — M62.838 MUSCLE SPASM: ICD-10-CM

## 2019-08-21 DIAGNOSIS — M99.01 SEGMENTAL DYSFUNCTION OF CERVICAL REGION: Primary | ICD-10-CM

## 2019-08-21 DIAGNOSIS — S13.4XXA SPRAIN OF LIGAMENTS OF CERVICAL SPINE: ICD-10-CM

## 2019-08-21 DIAGNOSIS — M99.04 SEGMENTAL DYSFUNCTION OF SACRAL REGION: ICD-10-CM

## 2019-08-21 DIAGNOSIS — M99.02 SEGMENTAL DYSFUNCTION OF THORACIC REGION: ICD-10-CM

## 2019-08-21 DIAGNOSIS — M99.03 SEGMENTAL DYSFUNCTION OF LUMBAR REGION: ICD-10-CM

## 2019-08-21 PROCEDURE — 98941 CHIROPRACT MANJ 3-4 REGIONS: CPT | Mod: AT | Performed by: CHIROPRACTOR

## 2019-08-21 PROCEDURE — 97035 APP MDLTY 1+ULTRASOUND EA 15: CPT | Performed by: CHIROPRACTOR

## 2019-08-21 NOTE — PROGRESS NOTES
Visit #:  5 of 12 based on treatment plan 8/2/2019    Subjective:  Marie Yeboah is a 29 year old female who is seen in f/u up for:        Segmental dysfunction of cervical region  Segmental dysfunction of thoracic region  Segmental dysfunction of lumbar region  Segmental dysfunction of sacral region  Sprain of ligaments of cervical spine  Muscle spasm.     Since last visit on 8/12/2019,  Marie Yeboah reports the following changes: Patient presents and states that she is doing better. She had some vertigo after her last adjustment and threw up twice, laid down for an hour and was fine. Since that time, she has felt really well. She got a massage and feels a bit looser from that. She rates her current pain 1/10.     Due to vertigo, we will utilized the Activator today for cervical spine adjustments.      Objective:  The following was observed:    P: palpatory tenderness    A: static palpation demonstrates intersegmental asymmetry     R: motion palpation notes restricted motion    T: localized muscle spasm at: Sub-occipital and Traps L>>R      Assessment:    Segmental spinal dysfunction/restrictions found at:  C2 RR, LRR ACTIVATOR TO CERVICAL SPINE  C5 LR, RRR  T1 RR, LRR  T5 E, FR  T10 E, FR  L4 LR, RRR  Right SI posterior    Diagnoses:      1. Segmental dysfunction of cervical region    2. Segmental dysfunction of thoracic region    3. Segmental dysfunction of lumbar region    4. Segmental dysfunction of sacral region    5. Sprain of ligaments of cervical spine    6. Muscle spasm        Patient's condition:  Patient had restrictions pre-manipulation and Patient had decreased motion prior to manipulation    Treatment effectiveness:  Post manipulation there is better intersegmental movement and Patient claims to feel looser post manipulation      Procedures:  CMT:  92642 Chiropractic manipulative treatment 3-4 regions performed   Cervical: Activator, C2, C5, Seated  Thoracic: Diversified, T1, T5, T10,  Prone  Lumbar: Drop Table, L4, Prone  Pelvis: Drop Table, PSIS Right , Prone    Modalities:  21965: US:  1.0 Mahmood/cm squared for 8 minutes at mhz continuous pulsed, left upper traps and suboccipitals    Therapeutic procedures:Gave patient Ice instructions post adjustment, and instructions for acute care      Prognosis: Good    Progress towards Goals: Patient is making progress towards the goal of:  Decrease pain in neck and upper back from 7/10 to 3/10 in 4 treatments.  Reduce functional impairment in Neck Disability from 58% to 25% in 4 treatments.  Reduce intensity and frequency of headache.  Return to work.        Response to Treatment:   Patient tolerated treatment well today.       Recommendations:    Instructions:ice 20 minutes every other hour as needed    Follow-up:  Return to care next week.

## 2019-08-26 ENCOUNTER — OFFICE VISIT (OUTPATIENT)
Dept: PODIATRY | Facility: CLINIC | Age: 29
End: 2019-08-26
Payer: COMMERCIAL

## 2019-08-26 VITALS
BODY MASS INDEX: 39.71 KG/M2 | HEIGHT: 67 IN | DIASTOLIC BLOOD PRESSURE: 90 MMHG | WEIGHT: 253 LBS | SYSTOLIC BLOOD PRESSURE: 122 MMHG

## 2019-08-26 DIAGNOSIS — S92.511A CLOSED DISPLACED FRACTURE OF PROXIMAL PHALANX OF LESSER TOE OF RIGHT FOOT, INITIAL ENCOUNTER: Primary | ICD-10-CM

## 2019-08-26 PROCEDURE — 99207 ZZC FRACTURE CARE IN GLOBAL PERIOD: CPT | Performed by: PODIATRIST

## 2019-08-26 ASSESSMENT — PAIN SCALES - GENERAL: PAINLEVEL: MODERATE PAIN (5)

## 2019-08-26 ASSESSMENT — MIFFLIN-ST. JEOR: SCORE: 1910.23

## 2019-08-26 NOTE — LETTER
[address] [address]  [phone]    2019      RE:  Marie Yeboah  : 1990      To whom it may concern:    This patient may continue light duty seated work only for 2 days then return to activities as tolerated in post op shoe.      Sincerely,          Torey Mann DPM
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
## 2019-08-28 ENCOUNTER — THERAPY VISIT (OUTPATIENT)
Dept: CHIROPRACTIC MEDICINE | Facility: CLINIC | Age: 29
End: 2019-08-28
Payer: COMMERCIAL

## 2019-08-28 ENCOUNTER — MYC MEDICAL ADVICE (OUTPATIENT)
Dept: PODIATRY | Facility: CLINIC | Age: 29
End: 2019-08-28

## 2019-08-28 DIAGNOSIS — M99.04 SEGMENTAL DYSFUNCTION OF SACRAL REGION: ICD-10-CM

## 2019-08-28 DIAGNOSIS — M99.03 SEGMENTAL DYSFUNCTION OF LUMBAR REGION: ICD-10-CM

## 2019-08-28 DIAGNOSIS — M99.02 SEGMENTAL DYSFUNCTION OF THORACIC REGION: ICD-10-CM

## 2019-08-28 DIAGNOSIS — M62.838 MUSCLE SPASM: ICD-10-CM

## 2019-08-28 DIAGNOSIS — M99.01 SEGMENTAL DYSFUNCTION OF CERVICAL REGION: Primary | ICD-10-CM

## 2019-08-28 DIAGNOSIS — S13.4XXA SPRAIN OF LIGAMENTS OF CERVICAL SPINE: ICD-10-CM

## 2019-08-28 PROCEDURE — 98941 CHIROPRACT MANJ 3-4 REGIONS: CPT | Mod: AT | Performed by: CHIROPRACTOR

## 2019-08-28 NOTE — LETTER
[address] [address]  [phone]    2019      RE:  Marie Yeboah  : 1990      To whom it may concern:    Please excuse this patient from work for 2 more days. Otherwise light duty seated work only in fracture boot until follow up on 2019.      Sincerely,        Torey Mann DPM

## 2019-08-28 NOTE — TELEPHONE ENCOUNTER
Read the last note.  If she requests more time off we should put her in a tall fracture boot.  We could then give her 2 more days off otherwise light duty seated work only until this is resolved.  She should be in a tall fracture boot if she still having pain.  This can be dispensed any time.  If she picks up the fracture boot we can give her 2 more days off of work and then light duty seated work only in a fracture boot.    I think she might already have a boot. ?  Wear it.

## 2019-08-28 NOTE — LETTER
[address] [address]  [phone]    2019      RE:  Marie Yeboah  : 1990      To whom it may concern:    This patient must be off work for 2 days then weight bearing in fracture boot, as tolerated, until follow up on 2019.      Sincerely,        Torey Mann DPM

## 2019-08-28 NOTE — TELEPHONE ENCOUNTER
"Patient has started wearing the fracture boot now.  She still has quite considerable pain and anxiety associated with this.  She is requesting 2 more days of no work then work as tolerated.      Please excuse patient from work 2 more days otherwise light duty seated work only until this is resolved.\"  "

## 2019-08-28 NOTE — TELEPHONE ENCOUNTER
"Patient called back and reports just starting to wear the fracture last evening. Would like a letter to relieve her from work x 2 days provided to her through [employer]: \"Please excuse patient from work 2 more days otherwise light duty seated work only until this is resolved.\"    Magali Granados RN on 8/28/2019 at 3:18 PM    "

## 2019-08-28 NOTE — PROGRESS NOTES
Visit #:  6 of 12 based on treatment plan 8/2/2019    Subjective:  Marie Yeboah is a 29 year old female who is seen in f/u up for:        Segmental dysfunction of cervical region  Segmental dysfunction of thoracic region  Segmental dysfunction of lumbar region  Segmental dysfunction of sacral region  Sprain of ligaments of cervical spine  Muscle spasm.     Since last visit on 8/21/2019,  Marie Yeboah reports the following changes: Patient presents and states that her foot is not healing so she is out work for 2 days pre [employer]. This is day 2. She did fall at home yesterday from her vertigo. She was dizzy and nauseas and missed a step. She had her post-op shoe on which protected her foot. The dizziness has been consistent since it started, she does not feel like the adjusting has had any hinderance on the vertigo. She rates her current pain 1/10. She has had some mild headaches.         Objective:  The following was observed:    P: palpatory tenderness L upper traps    A: static palpation demonstrates intersegmental asymmetry     R: motion palpation notes restricted motion    T: localized muscle spasm at: Sub-occipital and Traps L>>R      Assessment:    Segmental spinal dysfunction/restrictions found at:  C1 LR, RRR  T1 RR, LRR  T5 E, FR  T10 E, FR  Right SI posterior    Diagnoses:      1. Segmental dysfunction of cervical region    2. Segmental dysfunction of thoracic region    3. Segmental dysfunction of lumbar region    4. Segmental dysfunction of sacral region    5. Sprain of ligaments of cervical spine    6. Muscle spasm        Patient's condition:  Patient had restrictions pre-manipulation and Patient had decreased motion prior to manipulation    Treatment effectiveness:  Post manipulation there is better intersegmental movement and Patient claims to feel looser post manipulation      Procedures:  CMT:  77328 Chiropractic manipulative treatment 3-4 regions performed   Cervical: Activator, C1,  Seated  Thoracic: Diversified, T1, T5, T10, Prone  Pelvis: Drop Table, PSIS Right , Prone    Modalities:  MSTM to affected musculature, on Left    Therapeutic procedures:Gave patient Ice instructions post adjustment, and instructions for acute care      Prognosis: Good    Progress towards Goals: Patient is making progress towards the goal of:  Decrease pain in neck and upper back from 7/10 to 3/10 in 4 treatments.  Reduce functional impairment in Neck Disability from 58% to 25% in 4 treatments.  Reduce intensity and frequency of headache.  Return to work.        Response to Treatment:   Patient tolerated treatment well today.       Recommendations:    Instructions:ice 20 minutes every other hour as needed    Follow-up:  Return to care next week.

## 2019-09-04 ENCOUNTER — THERAPY VISIT (OUTPATIENT)
Dept: CHIROPRACTIC MEDICINE | Facility: CLINIC | Age: 29
End: 2019-09-04
Payer: COMMERCIAL

## 2019-09-04 DIAGNOSIS — M62.838 MUSCLE SPASM: ICD-10-CM

## 2019-09-04 DIAGNOSIS — S13.4XXA SPRAIN OF LIGAMENTS OF CERVICAL SPINE: ICD-10-CM

## 2019-09-04 DIAGNOSIS — M99.03 SEGMENTAL DYSFUNCTION OF LUMBAR REGION: ICD-10-CM

## 2019-09-04 DIAGNOSIS — M99.01 SEGMENTAL DYSFUNCTION OF CERVICAL REGION: Primary | ICD-10-CM

## 2019-09-04 DIAGNOSIS — M99.02 SEGMENTAL DYSFUNCTION OF THORACIC REGION: ICD-10-CM

## 2019-09-04 DIAGNOSIS — M99.04 SEGMENTAL DYSFUNCTION OF SACRAL REGION: ICD-10-CM

## 2019-09-04 PROCEDURE — 98941 CHIROPRACT MANJ 3-4 REGIONS: CPT | Mod: AT | Performed by: CHIROPRACTOR

## 2019-09-04 NOTE — PROGRESS NOTES
Visit #:  7 of 12 based on treatment plan 8/2/2019    Subjective:  Marie Yeboah is a 29 year old female who is seen in f/u up for:        Segmental dysfunction of cervical region  Segmental dysfunction of thoracic region  Segmental dysfunction of lumbar region  Segmental dysfunction of sacral region  Sprain of ligaments of cervical spine  Muscle spasm.     Since last visit on 8/28/2019,  Marie Yeboah reports the following changes: Patient presents and states that her foot is improving. She is feeling pretty good today. She had some whoosh of dizziness in her car yesterday, but she stretched and it went away. So the dizziness seems to be improving. She feels like the left shoulder is improving with care. She rates her current pain 1/10.       Objective:  The following was observed:    P: palpatory tenderness L upper traps    A: static palpation demonstrates intersegmental asymmetry     R: motion palpation notes restricted motion    T: localized muscle spasm at: Sub-occipital and Traps L>>R      Assessment:    Segmental spinal dysfunction/restrictions found at:  C1 RR, LRR ACTIVATOR  C5 LR, RRR  T1 RR, LRR  T5 E, FR  T10 E, FR  Right SI posterior    Diagnoses:      1. Segmental dysfunction of cervical region    2. Segmental dysfunction of thoracic region    3. Segmental dysfunction of lumbar region    4. Segmental dysfunction of sacral region    5. Sprain of ligaments of cervical spine    6. Muscle spasm        Patient's condition:  Patient had restrictions pre-manipulation and Patient had decreased motion prior to manipulation    Treatment effectiveness:  Post manipulation there is better intersegmental movement and Patient claims to feel looser post manipulation      Procedures:  CMT:  98969 Chiropractic manipulative treatment 3-4 regions performed   Cervical: Activator, C1, C5,  Prone  Thoracic: Diversified, T1, T5, T10, Prone  Pelvis: Drop Table, PSIS Right , Prone    Modalities:  MSTM to affected musculature,  on Left    Therapeutic procedures:Gave patient Ice instructions post adjustment, and instructions for acute care      Prognosis: Good    Progress towards Goals: Patient is making progress towards the goal of:  Decrease pain in neck and upper back from 7/10 to 3/10 in 4 treatments.  Reduce functional impairment in Neck Disability from 58% to 25% in 4 treatments.  Reduce intensity and frequency of headache.  Return to work.        Response to Treatment:   Patient tolerated treatment well today.       Recommendations:    Instructions:ice 20 minutes every other hour as needed    Follow-up:  Return to care next week.

## 2019-09-05 ENCOUNTER — MYC MEDICAL ADVICE (OUTPATIENT)
Dept: FAMILY MEDICINE | Facility: CLINIC | Age: 29
End: 2019-09-05
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
## 2019-09-05 DIAGNOSIS — S80.02XA CONTUSION OF LEFT KNEE, INITIAL ENCOUNTER: Primary | ICD-10-CM

## 2019-09-09 ENCOUNTER — OFFICE VISIT (OUTPATIENT)
Dept: PODIATRY | Facility: CLINIC | Age: 29
End: 2019-09-09
Payer: COMMERCIAL

## 2019-09-09 ENCOUNTER — ANCILLARY PROCEDURE (OUTPATIENT)
Dept: GENERAL RADIOLOGY | Facility: CLINIC | Age: 29
End: 2019-09-09
Attending: PODIATRIST
Payer: COMMERCIAL

## 2019-09-09 VITALS
HEIGHT: 67 IN | BODY MASS INDEX: 39.71 KG/M2 | WEIGHT: 253 LBS | SYSTOLIC BLOOD PRESSURE: 136 MMHG | DIASTOLIC BLOOD PRESSURE: 82 MMHG

## 2019-09-09 DIAGNOSIS — Q74.2 ACCESSORY NAVICULAR BONE OF RIGHT FOOT: ICD-10-CM

## 2019-09-09 DIAGNOSIS — M76.821 POSTERIOR TIBIAL TENDONITIS, RIGHT: ICD-10-CM

## 2019-09-09 DIAGNOSIS — S92.511A CLOSED DISPLACED FRACTURE OF PROXIMAL PHALANX OF LESSER TOE OF RIGHT FOOT, INITIAL ENCOUNTER: Primary | ICD-10-CM

## 2019-09-09 DIAGNOSIS — S92.511A CLOSED DISPLACED FRACTURE OF PROXIMAL PHALANX OF LESSER TOE OF RIGHT FOOT, INITIAL ENCOUNTER: ICD-10-CM

## 2019-09-09 PROCEDURE — 73630 X-RAY EXAM OF FOOT: CPT | Mod: TC

## 2019-09-09 PROCEDURE — 99213 OFFICE O/P EST LOW 20 MIN: CPT | Performed by: PODIATRIST

## 2019-09-09 ASSESSMENT — PAIN SCALES - GENERAL: PAINLEVEL: NO PAIN (0)

## 2019-09-09 ASSESSMENT — MIFFLIN-ST. JEOR: SCORE: 1910.23
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
## 2019-09-09 NOTE — LETTER
[address] [address]  [phone]    2019      RE:  Marie Yeboah  : 1990      To whom it may concern:    This patient may continue all activities without restrictions.     Sincerely,          Torey Mann DPM

## 2019-09-11 ENCOUNTER — THERAPY VISIT (OUTPATIENT)
Dept: CHIROPRACTIC MEDICINE | Facility: CLINIC | Age: 29
End: 2019-09-11
Payer: COMMERCIAL

## 2019-09-11 DIAGNOSIS — M99.03 SEGMENTAL DYSFUNCTION OF LUMBAR REGION: ICD-10-CM

## 2019-09-11 DIAGNOSIS — M62.838 MUSCLE SPASM: ICD-10-CM

## 2019-09-11 DIAGNOSIS — M99.02 SEGMENTAL DYSFUNCTION OF THORACIC REGION: ICD-10-CM

## 2019-09-11 DIAGNOSIS — S13.4XXA SPRAIN OF LIGAMENTS OF CERVICAL SPINE: ICD-10-CM

## 2019-09-11 DIAGNOSIS — M99.01 SEGMENTAL DYSFUNCTION OF CERVICAL REGION: Primary | ICD-10-CM

## 2019-09-11 DIAGNOSIS — M99.04 SEGMENTAL DYSFUNCTION OF SACRAL REGION: ICD-10-CM

## 2019-09-11 PROCEDURE — 98941 CHIROPRACT MANJ 3-4 REGIONS: CPT | Mod: AT | Performed by: CHIROPRACTOR

## 2019-09-11 NOTE — PROGRESS NOTES
Visit #:  8 of 12 based on treatment plan 8/2/2019    Subjective:  Marie Yeboah is a 29 year old female who is seen in f/u up for:        Segmental dysfunction of cervical region  Segmental dysfunction of thoracic region  Segmental dysfunction of lumbar region  Segmental dysfunction of sacral region  Sprain of ligaments of cervical spine  Muscle spasm.     Since last visit on 9/4/2019,  Marie Yeboah reports the following changes: Patient presents and states that she is doing pretty good today. She has not had anymore dizziness. She is no longer wearing the boot, so she feels like things are improving. She has some neck tightness and pain today, rated 3/10.      Objective:  The following was observed:    P: palpatory tenderness L upper traps    A: static palpation demonstrates intersegmental asymmetry     R: motion palpation notes restricted motion    T: localized muscle spasm at: Sub-occipital and Traps L>>R      Assessment:    Segmental spinal dysfunction/restrictions found at:  C1 RR, LRR ACTIVATOR  C5 LR, RRR  T1 RR, LRR  T5 E, FR  T10 E, FR  Right SI posterior    Diagnoses:      1. Segmental dysfunction of cervical region    2. Segmental dysfunction of thoracic region    3. Segmental dysfunction of lumbar region    4. Segmental dysfunction of sacral region    5. Sprain of ligaments of cervical spine    6. Muscle spasm        Patient's condition:  Patient had restrictions pre-manipulation and Patient had decreased motion prior to manipulation    Treatment effectiveness:  Post manipulation there is better intersegmental movement and Patient claims to feel looser post manipulation      Procedures:  CMT:  31999 Chiropractic manipulative treatment 3-4 regions performed   Cervical: Activator, C1, C5,  Prone  Thoracic: Diversified, T1, T5, T10, Prone  Pelvis: Drop Table, PSIS Right , Prone    Modalities:  MSTM to affected musculature, on Left    Therapeutic procedures:Gave patient Ice instructions post adjustment,  and instructions for acute care      Prognosis: Good    Progress towards Goals: Patient is making progress towards the goal of:  Decrease pain in neck and upper back from 7/10 to 3/10 in 4 treatments.  Reduce functional impairment in Neck Disability from 58% to 25% in 4 treatments.  Reduce intensity and frequency of headache.  Return to work.        Response to Treatment:   Patient tolerated treatment well today. Dizziness seems to have subsided.       Recommendations:    Instructions:ice 20 minutes every other hour as needed    Follow-up:  Return to care in 1 week.

## 2019-09-23 ENCOUNTER — THERAPY VISIT (OUTPATIENT)
Dept: CHIROPRACTIC MEDICINE | Facility: CLINIC | Age: 29
End: 2019-09-23
Payer: COMMERCIAL

## 2019-09-23 DIAGNOSIS — M99.03 SEGMENTAL DYSFUNCTION OF LUMBAR REGION: ICD-10-CM

## 2019-09-23 DIAGNOSIS — M99.02 SEGMENTAL DYSFUNCTION OF THORACIC REGION: ICD-10-CM

## 2019-09-23 DIAGNOSIS — M99.04 SEGMENTAL DYSFUNCTION OF SACRAL REGION: ICD-10-CM

## 2019-09-23 DIAGNOSIS — M99.01 SEGMENTAL DYSFUNCTION OF CERVICAL REGION: Primary | ICD-10-CM

## 2019-09-23 DIAGNOSIS — S13.4XXA SPRAIN OF LIGAMENTS OF CERVICAL SPINE: ICD-10-CM

## 2019-09-23 DIAGNOSIS — M62.838 MUSCLE SPASM: ICD-10-CM

## 2019-09-23 PROCEDURE — 97035 APP MDLTY 1+ULTRASOUND EA 15: CPT | Performed by: CHIROPRACTOR

## 2019-09-23 PROCEDURE — 98941 CHIROPRACT MANJ 3-4 REGIONS: CPT | Mod: AT | Performed by: CHIROPRACTOR

## 2019-09-23 NOTE — PROGRESS NOTES
Visit #:  9 of 12 based on treatment plan 8/2/2019    Subjective:  Marie Yeboah is a 29 year old female who is seen in f/u up for:        Segmental dysfunction of cervical region  Segmental dysfunction of thoracic region  Segmental dysfunction of lumbar region  Segmental dysfunction of sacral region  Sprain of ligaments of cervical spine  Muscle spasm.     Since last visit on 9/11/2019,  Marie Yeboah reports the following changes: Patient presents and states that she is miserable today, she didn't have an adjustment last week. Her left side of her neck is really bothering her again. She had an episode of dizziness sitting at her desk about 10 minutes ago. She is better now, but still feels nauseas. The episodes of dizziness are very random. She has been having headaches every 3-4 days.       Objective:  The following was observed:    P: palpatory tenderness L upper traps    A: static palpation demonstrates intersegmental asymmetry     R: motion palpation notes restricted motion    T: localized muscle spasm at: Sub-occipital and Traps L>>R      Assessment:    Segmental spinal dysfunction/restrictions found at:  C1 RR, LRR ACTIVATOR  C5 LR, RRR  T1 RR, LRR  T5 E, FR  T10 E, FR  Right SI posterior    Diagnoses:      1. Segmental dysfunction of cervical region    2. Segmental dysfunction of thoracic region    3. Segmental dysfunction of lumbar region    4. Segmental dysfunction of sacral region    5. Sprain of ligaments of cervical spine    6. Muscle spasm        Patient's condition:  Patient had restrictions pre-manipulation and Patient had decreased motion prior to manipulation    Treatment effectiveness:  Post manipulation there is better intersegmental movement and Patient claims to feel looser post manipulation      Procedures:  CMT:  20590 Chiropractic manipulative treatment 3-4 regions performed   Cervical: Activator, C1, C5,  Prone  Thoracic: Diversified, T1, T5, T10, Prone  Pelvis: Drop Table, PSIS Right ,  Prone    Modalities:  MSTM to affected musculature, on Left  US to left upper traps, 1.1 moore, continuous, 8 min    Therapeutic procedures:Gave patient Ice instructions post adjustment, and instructions for acute care      Prognosis: Good    Progress towards Goals: Patient is making progress towards the goal of:  Decrease pain in neck and upper back from 7/10 to 3/10 in 4 treatments.  Reduce functional impairment in Neck Disability from 58% to 25% in 4 treatments.  Reduce intensity and frequency of headache.  Return to work.        Response to Treatment:   Patient tolerated treatment well today. Dizziness seems to have subsided.       Recommendations:    Instructions:ice 20 minutes every other hour as needed    Follow-up:  Return to care Friday.

## 2019-09-27 ENCOUNTER — THERAPY VISIT (OUTPATIENT)
Dept: CHIROPRACTIC MEDICINE | Facility: CLINIC | Age: 29
End: 2019-09-27
Payer: COMMERCIAL

## 2019-09-27 DIAGNOSIS — M99.03 SEGMENTAL DYSFUNCTION OF LUMBAR REGION: ICD-10-CM

## 2019-09-27 DIAGNOSIS — M99.01 SEGMENTAL DYSFUNCTION OF CERVICAL REGION: Primary | ICD-10-CM

## 2019-09-27 DIAGNOSIS — M62.838 MUSCLE SPASM: ICD-10-CM

## 2019-09-27 DIAGNOSIS — M99.04 SEGMENTAL DYSFUNCTION OF SACRAL REGION: ICD-10-CM

## 2019-09-27 DIAGNOSIS — S13.4XXA SPRAIN OF LIGAMENTS OF CERVICAL SPINE: ICD-10-CM

## 2019-09-27 DIAGNOSIS — M99.02 SEGMENTAL DYSFUNCTION OF THORACIC REGION: ICD-10-CM

## 2019-09-27 PROCEDURE — 98941 CHIROPRACT MANJ 3-4 REGIONS: CPT | Mod: AT | Performed by: CHIROPRACTOR

## 2019-09-27 PROCEDURE — 97110 THERAPEUTIC EXERCISES: CPT | Performed by: CHIROPRACTOR

## 2019-09-27 NOTE — PROGRESS NOTES
Visit #:  10 of 12 based on treatment plan 8/2/2019    Subjective:  aMrie Yeboah is a 29 year old female who is seen in f/u up for:        Segmental dysfunction of cervical region  Segmental dysfunction of thoracic region  Segmental dysfunction of lumbar region  Segmental dysfunction of sacral region  Sprain of ligaments of cervical spine  Muscle spasm.     Since last visit on 9/23/2019,  Marie Yeboah reports the following changes: Patient presents and states that she is better today. She has pain in her left neck, rated 3/10. She has better CAROM and feels like she is sleeping and doing better. She has not had any dizziness since her last visit!      Objective:  The following was observed:    P: palpatory tenderness L upper traps    A: static palpation demonstrates intersegmental asymmetry     R: motion palpation notes restricted motion    T: localized muscle spasm at: Sub-occipital and Traps L>>R      Assessment:    Segmental spinal dysfunction/restrictions found at:  C1 LR, RRR ACTIVATOR  T1 RR, LRR  T5 E, FR  T9 E, FR  Left SI posterior    Diagnoses:      1. Segmental dysfunction of cervical region    2. Segmental dysfunction of thoracic region    3. Segmental dysfunction of lumbar region    4. Segmental dysfunction of sacral region    5. Sprain of ligaments of cervical spine    6. Muscle spasm        Patient's condition:  Patient had restrictions pre-manipulation and Patient had decreased motion prior to manipulation    Treatment effectiveness:  Post manipulation there is better intersegmental movement and Patient claims to feel looser post manipulation      Procedures:  CMT:  52062 Chiropractic manipulative treatment 3-4 regions performed   Cervical: Activator, C1 Prone  Thoracic: Diversified, T1, T5, T9 Prone  Pelvis: Drop Table, PSIS Left, Prone    Modalities:  MSTM to affected musculature, on Left  US to left upper traps, 1.1 moore, continuous, 8 min - not performed today    Therapeutic  procedures:  Therapeutic Exercise: Cervical stretches and Bruegger's Relief  Gave patient Ice instructions post adjustment, and instructions for acute care      Prognosis: Good    Progress towards Goals: Patient is making progress towards the goal of:  Decrease pain in neck and upper back from 7/10 to 3/10 in 4 treatments.  Reduce functional impairment in Neck Disability from 58% to 25% in 4 treatments.  Reduce intensity and frequency of headache.  Return to work.        Response to Treatment:   Patient tolerated treatment well today. Dizziness seems to have subsided.       Recommendations:    Instructions:ice 20 minutes every other hour as needed    Follow-up:  Return to care next week. Patient felt improvement with this week's care.

## 2019-10-02 ENCOUNTER — THERAPY VISIT (OUTPATIENT)
Dept: CHIROPRACTIC MEDICINE | Facility: CLINIC | Age: 29
End: 2019-10-02
Payer: COMMERCIAL

## 2019-10-02 DIAGNOSIS — M99.04 SEGMENTAL DYSFUNCTION OF SACRAL REGION: ICD-10-CM

## 2019-10-02 DIAGNOSIS — S13.4XXA SPRAIN OF LIGAMENTS OF CERVICAL SPINE: ICD-10-CM

## 2019-10-02 DIAGNOSIS — M99.03 SEGMENTAL DYSFUNCTION OF LUMBAR REGION: ICD-10-CM

## 2019-10-02 DIAGNOSIS — M99.02 SEGMENTAL DYSFUNCTION OF THORACIC REGION: ICD-10-CM

## 2019-10-02 DIAGNOSIS — M62.838 MUSCLE SPASM: ICD-10-CM

## 2019-10-02 DIAGNOSIS — M99.01 SEGMENTAL DYSFUNCTION OF CERVICAL REGION: Primary | ICD-10-CM

## 2019-10-02 PROCEDURE — 98941 CHIROPRACT MANJ 3-4 REGIONS: CPT | Mod: AT | Performed by: CHIROPRACTOR

## 2019-10-02 NOTE — PROGRESS NOTES
"Visit #:  11 of 12 based on treatment plan 8/2/2019    Subjective:  Marie Yeboah is a 29 year old female who is seen in f/u up for:        Segmental dysfunction of cervical region  Segmental dysfunction of thoracic region  Segmental dysfunction of lumbar region  Segmental dysfunction of sacral region  Sprain of ligaments of cervical spine  Muscle spasm.     Since last visit on 9/27/2019,  Marie Yeboah reports the following changes: Patient presents and states that she is still doing better. She had another \"weird dizzy episode\" when she stretched her neck to the right, and then it came on. She is trying to be aware of when it happens. She currently has minimal pain rated 2/10 on the left side.     Objective:  The following was observed:    P: palpatory tenderness L upper traps    A: static palpation demonstrates intersegmental asymmetry     R: motion palpation notes restricted motion    T: localized muscle spasm at: Sub-occipital and Traps L>>R      Assessment:    Segmental spinal dysfunction/restrictions found at:  C1 RR, LRR ACTIVATOR  C5 LR, RRR (gentle mobs)  T1 RR, LRR  T5 E, FR  T10 E, FR  Left SI posterior    Diagnoses:      1. Segmental dysfunction of cervical region    2. Segmental dysfunction of thoracic region    3. Segmental dysfunction of lumbar region    4. Segmental dysfunction of sacral region    5. Sprain of ligaments of cervical spine    6. Muscle spasm        Patient's condition:  Patient had restrictions pre-manipulation and Patient had decreased motion prior to manipulation    Treatment effectiveness:  Post manipulation there is better intersegmental movement and Patient claims to feel looser post manipulation      Procedures:  CMT:  02989 Chiropractic manipulative treatment 3-4 regions performed   Cervical: Activator, C1, C5, Prone (C5 gentle mobs)  Thoracic: Diversified, T1, T5, T10,  Prone  Pelvis: Drop Table, PSIS Left, Prone    Modalities:  MSTM to affected musculature, on Left WITH " [employer]  US to left upper traps, 1.1 moore, continuous, 8 min - not performed today    Therapeutic procedures:  Therapeutic Exercise: Cervical stretches and Bruegger's Relief  Gave patient Ice instructions post adjustment, and instructions for acute care      Prognosis: Good    Progress towards Goals: Patient is making progress towards the goal of:  Decrease pain in neck and upper back from 7/10 to 3/10 in 4 treatments.  Reduce functional impairment in Neck Disability from 58% to 25% in 4 treatments.  Reduce intensity and frequency of headache.  Return to work.        Response to Treatment:   Patient tolerated treatment well today. Dizziness only with RLF.       Recommendations:    Instructions:ice 20 minutes every other hour as needed    Follow-up:  Return to care next week.

## 2019-10-04 ENCOUNTER — HOSPITAL ENCOUNTER (OUTPATIENT)
Dept: PHYSICAL THERAPY | Facility: CLINIC | Age: 29
Setting detail: THERAPIES SERIES
End: 2019-10-04
Attending: NURSE PRACTITIONER
Payer: COMMERCIAL

## 2019-10-04 DIAGNOSIS — S80.02XA CONTUSION OF LEFT KNEE, INITIAL ENCOUNTER: ICD-10-CM
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
## 2019-10-04 PROCEDURE — 97161 PT EVAL LOW COMPLEX 20 MIN: CPT | Mod: GP

## 2019-10-04 PROCEDURE — 97110 THERAPEUTIC EXERCISES: CPT | Mod: GP
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
## 2019-10-08 ENCOUNTER — HOSPITAL ENCOUNTER (OUTPATIENT)
Dept: PHYSICAL THERAPY | Facility: CLINIC | Age: 29
Setting detail: THERAPIES SERIES
End: 2019-10-08
Attending: NURSE PRACTITIONER
Payer: COMMERCIAL

## 2019-10-08 PROCEDURE — 97110 THERAPEUTIC EXERCISES: CPT | Mod: GP

## 2019-10-09 ENCOUNTER — THERAPY VISIT (OUTPATIENT)
Dept: CHIROPRACTIC MEDICINE | Facility: CLINIC | Age: 29
End: 2019-10-09
Payer: COMMERCIAL

## 2019-10-09 DIAGNOSIS — M99.04 SEGMENTAL DYSFUNCTION OF SACRAL REGION: ICD-10-CM

## 2019-10-09 DIAGNOSIS — M99.02 SEGMENTAL DYSFUNCTION OF THORACIC REGION: ICD-10-CM

## 2019-10-09 DIAGNOSIS — M99.01 SEGMENTAL DYSFUNCTION OF CERVICAL REGION: Primary | ICD-10-CM

## 2019-10-09 DIAGNOSIS — M99.03 SEGMENTAL DYSFUNCTION OF LUMBAR REGION: ICD-10-CM

## 2019-10-09 DIAGNOSIS — M62.838 MUSCLE SPASM: ICD-10-CM

## 2019-10-09 DIAGNOSIS — S13.4XXA SPRAIN OF LIGAMENTS OF CERVICAL SPINE: ICD-10-CM

## 2019-10-09 PROCEDURE — 98941 CHIROPRACT MANJ 3-4 REGIONS: CPT | Mod: AT | Performed by: CHIROPRACTOR

## 2019-10-09 NOTE — PROGRESS NOTES
Visit #:  12 of 12 based on treatment plan 8/2/2019    Subjective:  Marie Yeboah is a 29 year old female who is seen in f/u up for:        Segmental dysfunction of cervical region  Segmental dysfunction of thoracic region  Segmental dysfunction of lumbar region  Segmental dysfunction of sacral region  Sprain of ligaments of cervical spine  Muscle spasm.     Since last visit on 10/2/2019,  Marie Yeboah reports the following changes: Patient presents and states that she is doing pretty good today. She only has minimal pain and hasn't had any dizzy episodes. Her pain is on the left side, upper back and neck, rated 2/10.      Objective:  The following was observed:    P: palpatory tenderness L upper traps    A: static palpation demonstrates intersegmental asymmetry     R: motion palpation notes restricted motion    T: localized muscle spasm at: Sub-occipital and Traps L>>R      Assessment:    Segmental spinal dysfunction/restrictions found at:    C5 RR, LRR (gentle mobs)  T1 RR, LRR  T5 E, FR  T10 E, FR  Left SI posterior    Diagnoses:      1. Segmental dysfunction of cervical region    2. Segmental dysfunction of thoracic region    3. Segmental dysfunction of lumbar region    4. Segmental dysfunction of sacral region    5. Sprain of ligaments of cervical spine    6. Muscle spasm        Patient's condition:  Patient had restrictions pre-manipulation and Patient had decreased motion prior to manipulation    Treatment effectiveness:  Post manipulation there is better intersegmental movement and Patient claims to feel looser post manipulation      Procedures:  CMT:  01436 Chiropractic manipulative treatment 3-4 regions performed   Cervical: Activator, C2, Prone (C5 gentle mobs)  Thoracic: Diversified, T1, T5, T10,  Prone  Pelvis: Drop Table, PSIS Left, Prone    Modalities:  MSTM to affected musculature, on Left WITH ChinaGel  US to left upper traps, 1.1 moore, continuous, 8 min - not performed today    Therapeutic  procedures:  Therapeutic Exercise: Cervical stretches and Bruegger's Relief  Gave patient Ice instructions post adjustment, and instructions for acute care      Prognosis: Good    Progress towards Goals: Patient is making progress towards the goal of:  Decrease pain in neck and upper back from 7/10 to 3/10 in 4 treatments.  Reduce functional impairment in Neck Disability from 58% to 25% in 4 treatments.  Reduce intensity and frequency of headache.  Return to work.        Response to Treatment:   Patient tolerated treatment well today and is improving with care.        Recommendations:    Instructions:ice 20 minutes every other hour as needed    Follow-up:  Return to care next week.

## 2019-10-10 ENCOUNTER — OFFICE VISIT (OUTPATIENT)
Dept: FAMILY MEDICINE | Facility: CLINIC | Age: 29
End: 2019-10-10
Payer: COMMERCIAL

## 2019-10-10 VITALS
SYSTOLIC BLOOD PRESSURE: 126 MMHG | BODY MASS INDEX: 38.91 KG/M2 | OXYGEN SATURATION: 96 % | WEIGHT: 247.9 LBS | TEMPERATURE: 97.9 F | RESPIRATION RATE: 20 BRPM | HEIGHT: 67 IN | DIASTOLIC BLOOD PRESSURE: 84 MMHG | HEART RATE: 98 BPM

## 2019-10-10 DIAGNOSIS — Z00.00 ROUTINE GENERAL MEDICAL EXAMINATION AT A HEALTH CARE FACILITY: Primary | ICD-10-CM

## 2019-10-10 DIAGNOSIS — K58.1 IRRITABLE BOWEL SYNDROME WITH CONSTIPATION: ICD-10-CM

## 2019-10-10 DIAGNOSIS — F41.9 ANXIETY: ICD-10-CM

## 2019-10-10 DIAGNOSIS — I10 HYPERTENSION, GOAL BELOW 140/90: ICD-10-CM

## 2019-10-10 PROCEDURE — 99213 OFFICE O/P EST LOW 20 MIN: CPT | Mod: 25 | Performed by: NURSE PRACTITIONER

## 2019-10-10 PROCEDURE — 99395 PREV VISIT EST AGE 18-39: CPT | Performed by: NURSE PRACTITIONER

## 2019-10-10 RX ORDER — ESCITALOPRAM OXALATE 10 MG/1
10 TABLET ORAL DAILY
Qty: 90 TABLET | Refills: 1 | Status: CANCELLED | OUTPATIENT
Start: 2019-10-10

## 2019-10-10 RX ORDER — ESCITALOPRAM OXALATE 10 MG/1
15 TABLET ORAL DAILY
Qty: 135 TABLET | Refills: 1 | Status: SHIPPED | OUTPATIENT
Start: 2019-10-10 | End: 2020-05-14

## 2019-10-10 RX ORDER — HYDROCHLOROTHIAZIDE 25 MG/1
25 TABLET ORAL DAILY
Qty: 90 TABLET | Refills: 1 | Status: SHIPPED | OUTPATIENT
Start: 2019-10-10 | End: 2020-05-14

## 2019-10-10 ASSESSMENT — ENCOUNTER SYMPTOMS
SORE THROAT: 0
PARESTHESIAS: 0
ARTHRALGIAS: 1
DIZZINESS: 1
DIARRHEA: 0
CHILLS: 0
NAUSEA: 0
FEVER: 0
HEADACHES: 1
MYALGIAS: 1
HEARTBURN: 0
JOINT SWELLING: 1
ABDOMINAL PAIN: 1
SHORTNESS OF BREATH: 0
CONSTIPATION: 0
DYSURIA: 0
EYE PAIN: 0
NERVOUS/ANXIOUS: 1
HEMATOCHEZIA: 0
WEAKNESS: 0
COUGH: 1
BREAST MASS: 0
HEMATURIA: 0
PALPITATIONS: 0
FREQUENCY: 0

## 2019-10-10 ASSESSMENT — ANXIETY QUESTIONNAIRES
IF YOU CHECKED OFF ANY PROBLEMS ON THIS QUESTIONNAIRE, HOW DIFFICULT HAVE THESE PROBLEMS MADE IT FOR YOU TO DO YOUR WORK, TAKE CARE OF THINGS AT HOME, OR GET ALONG WITH OTHER PEOPLE: SOMEWHAT DIFFICULT
5. BEING SO RESTLESS THAT IT IS HARD TO SIT STILL: MORE THAN HALF THE DAYS
GAD7 TOTAL SCORE: 13
7. FEELING AFRAID AS IF SOMETHING AWFUL MIGHT HAPPEN: MORE THAN HALF THE DAYS
1. FEELING NERVOUS, ANXIOUS, OR ON EDGE: MORE THAN HALF THE DAYS
2. NOT BEING ABLE TO STOP OR CONTROL WORRYING: MORE THAN HALF THE DAYS
6. BECOMING EASILY ANNOYED OR IRRITABLE: MORE THAN HALF THE DAYS
3. WORRYING TOO MUCH ABOUT DIFFERENT THINGS: MORE THAN HALF THE DAYS

## 2019-10-10 ASSESSMENT — MIFFLIN-ST. JEOR: SCORE: 1887.1

## 2019-10-10 ASSESSMENT — PATIENT HEALTH QUESTIONNAIRE - PHQ9: 5. POOR APPETITE OR OVEREATING: SEVERAL DAYS

## 2019-10-10 ASSESSMENT — PAIN SCALES - GENERAL: PAINLEVEL: MILD PAIN (2)
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
## 2019-10-11 ASSESSMENT — ANXIETY QUESTIONNAIRES: GAD7 TOTAL SCORE: 13

## 2019-10-16 ENCOUNTER — OFFICE VISIT (OUTPATIENT)
Dept: URGENT CARE | Facility: RETAIL CLINIC | Age: 29
End: 2019-10-16
Payer: COMMERCIAL

## 2019-10-16 ENCOUNTER — THERAPY VISIT (OUTPATIENT)
Dept: CHIROPRACTIC MEDICINE | Facility: CLINIC | Age: 29
End: 2019-10-16
Payer: COMMERCIAL

## 2019-10-16 VITALS
OXYGEN SATURATION: 97 % | DIASTOLIC BLOOD PRESSURE: 92 MMHG | SYSTOLIC BLOOD PRESSURE: 137 MMHG | HEART RATE: 82 BPM | TEMPERATURE: 98.2 F

## 2019-10-16 DIAGNOSIS — S13.4XXA SPRAIN OF LIGAMENTS OF CERVICAL SPINE: ICD-10-CM

## 2019-10-16 DIAGNOSIS — M99.01 SEGMENTAL DYSFUNCTION OF CERVICAL REGION: Primary | ICD-10-CM

## 2019-10-16 DIAGNOSIS — M99.02 SEGMENTAL DYSFUNCTION OF THORACIC REGION: ICD-10-CM

## 2019-10-16 DIAGNOSIS — M99.04 SEGMENTAL DYSFUNCTION OF SACRAL REGION: ICD-10-CM

## 2019-10-16 DIAGNOSIS — M99.03 SEGMENTAL DYSFUNCTION OF LUMBAR REGION: ICD-10-CM

## 2019-10-16 DIAGNOSIS — M62.838 MUSCLE SPASM: ICD-10-CM

## 2019-10-16 DIAGNOSIS — B34.9 VIRAL SYNDROME: ICD-10-CM

## 2019-10-16 DIAGNOSIS — J02.9 ACUTE PHARYNGITIS, UNSPECIFIED ETIOLOGY: Primary | ICD-10-CM

## 2019-10-16 LAB — S PYO AG THROAT QL IA.RAPID: NEGATIVE

## 2019-10-16 PROCEDURE — 99213 OFFICE O/P EST LOW 20 MIN: CPT | Performed by: FAMILY MEDICINE

## 2019-10-16 PROCEDURE — 98940 CHIROPRACT MANJ 1-2 REGIONS: CPT | Mod: AT | Performed by: CHIROPRACTOR

## 2019-10-16 PROCEDURE — 87081 CULTURE SCREEN ONLY: CPT | Performed by: FAMILY MEDICINE

## 2019-10-16 PROCEDURE — 87880 STREP A ASSAY W/OPTIC: CPT | Mod: QW | Performed by: FAMILY MEDICINE

## 2019-10-16 ASSESSMENT — PAIN SCALES - GENERAL: PAINLEVEL: MILD PAIN (2)

## 2019-10-16 NOTE — PROGRESS NOTES
SUBJECTIVE:  Chief Complaint   Patient presents with     Pharyngitis     Marie Yeboah is a 29 year old female   with a chief complaint of sore throat.  Onset of symptoms was 2 day(s) ago.    Course of illness: gradual onset, still present and constant.  Severity mild  Current and Associated symptoms: fever, sore throat, fatigue and stomachache  Treatment measures tried include Tylenol/Ibuprofen.  Predisposing factors include None.    Past Medical History:   Diagnosis Date     Anxiety      Depressive disorder 2007     HTN (hypertension)      Patient Active Problem List   Diagnosis     Hypertension, goal below 140/90     Obesity (BMI 35.0-39.9) with comorbidity (H)     Segmental dysfunction of cervical region     Segmental dysfunction of thoracic region     Segmental dysfunction of lumbar region     Segmental dysfunction of sacral region     Sprain of ligaments of cervical spine     Muscle spasm         ALLERGIES:  Contrast dye; Bee venom; and No known drug allergies    escitalopram (LEXAPRO) 10 MG tablet, Take 1.5 tablets (15 mg) by mouth daily  hydrochlorothiazide (HYDRODIURIL) 25 MG tablet, Take 1 tablet (25 mg) by mouth daily  levonorgestrel (MIRENA) 20 MCG/24HR IUD, 1 each by Intrauterine route once    No current facility-administered medications on file prior to visit.       Social History     Tobacco Use     Smoking status: Former Smoker     Years: 5.00     Types: Cigarettes     Start date: 1/1/2007     Smokeless tobacco: Never Used     Tobacco comment: Very little use now, maybe once a month   Substance Use Topics     Alcohol use: Yes     Frequency: 2-4 times a month       Family History   Problem Relation Age of Onset     Hypertension Mother      Seizure Disorder Mother      Unknown/Adopted Mother      Hypertension Father      Heart Disease Father      Hyperlipidemia Father      Anxiety Disorder Father      Brain Tumor Paternal Grandfather      Prostate Cancer Paternal Grandfather           ROS:  CONSTITUTIONAL:mild fever, chills,   INTEGUMENTARY/SKIN: NEGATIVE for worrisome rashes,  or lesions  EYES: NEGATIVE for vision changes or irritation  RESP:NEGATIVE for significant cough or SOB    OBJECTIVE:   BP (!) 137/92   Pulse 82   Temp 98.2  F (36.8  C) (Oral)   SpO2 97%   GENERAL APPEARANCE: alert, mild distress and cooperative  EYES: EOMI,  PERRL, conjunctiva clear  HENT: ear canals and TM's normal.  Nose normal.  Pharynx erythematous with some exudate noted.  NECK: supple, non-tender to palpation, no adenopathy noted  RESP: lungs clear to auscultation - no rales, rhonchi or wheezes  CV: regular rates and rhythm, normal S1 S2, no murmur noted  ABDOMEN:  soft, nontender, no HSM or masses and bowel sounds normal  SKIN: no suspicious lesions or rashes    Rapid Strep test is negative    ASSESSMENT:  Acute pharyngitis, unspecified etiology     - RAPID STREP SCREEN  - BETA STREP GROUP A R/O CULTURE    Viral syndrome      discussed with the patient that a confirmatory strep culture will be performed and that she will be called if the culture is positive for strep.  We discussed other possible causes of pharyngitis including cold viruses and  Viral gastroenteritis   Symptomatic treat with gargles, lozenges, and OTC analgesic as needed. Follow-up with primary clinic if not improving.

## 2019-10-16 NOTE — PROGRESS NOTES
Visit #:  13 of 12 based on treatment plan 8/2/2019    Subjective:  Marie Yeboah is a 29 year old female who is seen in f/u up for:        Segmental dysfunction of cervical region  Segmental dysfunction of thoracic region  Segmental dysfunction of lumbar region  Segmental dysfunction of sacral region  Sprain of ligaments of cervical spine  Muscle spasm.     Since last visit on 10/9/2019,  Marie Yeboah reports the following changes: Patient presents and states that she is not feeling well today. She has had headaches, stomach, fever and sore throat. She is heading over to [employer] and is leaving work for the day.       Objective:  The following was observed:    P: palpatory tenderness L upper traps    A: static palpation demonstrates intersegmental asymmetry     R: motion palpation notes restricted motion    T: localized muscle spasm at: Sub-occipital and Traps L>>R      Assessment:    Segmental spinal dysfunction/restrictions found at:  C1 RR, LRR Activator  C5 RR, LRR Activator  T1 RR, LRR Activator  T5 E, FR  T10 E, FR      Diagnoses:      1. Segmental dysfunction of cervical region    2. Segmental dysfunction of thoracic region    3. Segmental dysfunction of lumbar region    4. Segmental dysfunction of sacral region    5. Sprain of ligaments of cervical spine    6. Muscle spasm        Patient's condition:  Patient had restrictions pre-manipulation and Patient had decreased motion prior to manipulation    Treatment effectiveness:  Post manipulation there is better intersegmental movement and Patient claims to feel looser post manipulation      Procedures:  CMT:  52549 Chiropractic manipulative treatment 1-2 regions performed   Cervical: Activator, C2, Prone (C5 gentle mobs)  Thoracic: Diversified, T1, T5, T10,  Prone      Modalities:  MSTM to affected musculature, on Left WITH [employer]Gel  US to left upper traps, 1.1 moore, continuous, 8 min - not performed today    Therapeutic procedures:  Therapeutic  Exercise: Cervical stretches and Bruegger's Relief  Gave patient Ice instructions post adjustment, and instructions for acute care      Prognosis: Good    Progress towards Goals: Patient is making progress towards the goal of:  Decrease pain in neck and upper back from 7/10 to 3/10 in 4 treatments.  Reduce functional impairment in Neck Disability from 58% to 25% in 4 treatments.  Reduce intensity and frequency of headache.  Return to work.        Response to Treatment:   Patient tolerated treatment well today and is improving with care.        Recommendations:    Instructions:ice 20 minutes every other hour as needed    Follow-up:  Return to care next week. Patient is headed to [facility] to be evaluated.

## 2019-10-18 LAB
BACTERIA SPEC CULT: NORMAL
SPECIMEN SOURCE: NORMAL

## 2019-10-22 ENCOUNTER — HOSPITAL ENCOUNTER (OUTPATIENT)
Dept: PHYSICAL THERAPY | Facility: CLINIC | Age: 29
Setting detail: THERAPIES SERIES
End: 2019-10-22
Attending: NURSE PRACTITIONER
Payer: COMMERCIAL

## 2019-10-22 PROCEDURE — 97116 GAIT TRAINING THERAPY: CPT | Mod: GP

## 2019-10-22 PROCEDURE — 97110 THERAPEUTIC EXERCISES: CPT | Mod: GP

## 2019-10-25 ENCOUNTER — THERAPY VISIT (OUTPATIENT)
Dept: CHIROPRACTIC MEDICINE | Facility: CLINIC | Age: 29
End: 2019-10-25
Payer: COMMERCIAL

## 2019-10-25 ENCOUNTER — HOSPITAL ENCOUNTER (OUTPATIENT)
Dept: PHYSICAL THERAPY | Facility: CLINIC | Age: 29
Setting detail: THERAPIES SERIES
End: 2019-10-25
Attending: NURSE PRACTITIONER
Payer: COMMERCIAL

## 2019-10-25 DIAGNOSIS — M62.838 MUSCLE SPASM: ICD-10-CM

## 2019-10-25 DIAGNOSIS — M99.01 SEGMENTAL DYSFUNCTION OF CERVICAL REGION: Primary | ICD-10-CM

## 2019-10-25 DIAGNOSIS — M99.02 SEGMENTAL DYSFUNCTION OF THORACIC REGION: ICD-10-CM

## 2019-10-25 DIAGNOSIS — S13.4XXA SPRAIN OF LIGAMENTS OF CERVICAL SPINE: ICD-10-CM

## 2019-10-25 DIAGNOSIS — M99.03 SEGMENTAL DYSFUNCTION OF LUMBAR REGION: ICD-10-CM

## 2019-10-25 DIAGNOSIS — M99.04 SEGMENTAL DYSFUNCTION OF SACRAL REGION: ICD-10-CM

## 2019-10-25 PROCEDURE — 99212 OFFICE O/P EST SF 10 MIN: CPT | Mod: 25 | Performed by: CHIROPRACTOR

## 2019-10-25 PROCEDURE — 97110 THERAPEUTIC EXERCISES: CPT | Mod: GP

## 2019-10-25 PROCEDURE — 97112 NEUROMUSCULAR REEDUCATION: CPT | Mod: GP

## 2019-10-25 PROCEDURE — 98941 CHIROPRACT MANJ 3-4 REGIONS: CPT | Mod: AT | Performed by: CHIROPRACTOR

## 2019-10-25 NOTE — PROGRESS NOTES
"Visit #:  1 of 12 based on treatment plan 8/2/2019    Subjective:  Marie Yeboah is a 29 year old female who is seen in f/u up for:        Segmental dysfunction of cervical region  Segmental dysfunction of thoracic region  Segmental dysfunction of lumbar region  Segmental dysfunction of sacral region  Sprain of ligaments of cervical spine  Muscle spasm.     Since last visit on 10/16/2019,  Marie Yeboah reports the following changes: Patient presents and states that she is still not feeling well. She has a cough, sore throat, just not feeling well. Her neck and back are feeling a bit sore today. She does feel like she is doing better overall. She has not gotten any dizzy episodes since her last visit. She points to pain in her left traps, rated 2/10. She feels good after an adjustment for 2 days and then \"the muscle tightens up again.\"      Objective:  The following was observed:    CAROM: WNL, only mild pain    P: palpatory tenderness L upper traps    A: static palpation demonstrates intersegmental asymmetry     R: motion palpation notes restricted motion    T: localized muscle spasm at: Sub-occipital and Traps L>>R      Assessment:    Segmental spinal dysfunction/restrictions found at:  C1 LR, RRR Gentle mobs  C5 RR, LRR   T1 RR, LRR Activator  T5 E, FR  T10 E, FR  Left SI posterior      Diagnoses:      1. Segmental dysfunction of cervical region    2. Segmental dysfunction of thoracic region    3. Segmental dysfunction of lumbar region    4. Segmental dysfunction of sacral region    5. Sprain of ligaments of cervical spine    6. Muscle spasm        Patient's condition:  Patient had restrictions pre-manipulation and Patient had decreased motion prior to manipulation    Treatment effectiveness:  Post manipulation there is better intersegmental movement and Patient claims to feel looser post manipulation      Procedures:  Level 2 re-eval  CMT:  04212 Chiropractic manipulative treatment 3-4 regions performed "   Cervical: Activator, C2, C5, Prone (C5 gentle mobs)  Thoracic: Diversified, T1, T5, T10,  Prone  Pelvis: Drop assist, Left SI, Prone    Modalities:  MSTM to affected musculature, on Left WITH [employer]Gel      Therapeutic procedures:  Therapeutic Exercise: Cervical stretches and Bruegger's Relief  Gave patient Ice instructions post adjustment, and instructions for acute care      Prognosis: Good    Progress towards Goals: Patient is making progress towards the goal of:  Decrease pain in neck and upper back from 7/10 to 3/10 in 4 treatments. GOAL MET  Reduce functional impairment in Neck Disability from 58% to 25% in 4 treatments.  Reduce intensity and frequency of headache. GOAL MET  Return to work. GOAL MET  Decrease hypertonicity in left upper traps and cervical musculature.        Response to Treatment:   Patient tolerated treatment well today and is improving with care.        Recommendations:    Instructions:ice 20 minutes every other hour as needed    Follow-up:  Return to care next week. Patient is being referred for massage therapy as muscle hypertonicity in left traps is impeding her improvement.

## 2019-10-27 ENCOUNTER — OFFICE VISIT (OUTPATIENT)
Dept: URGENT CARE | Facility: RETAIL CLINIC | Age: 29
End: 2019-10-27
Payer: COMMERCIAL

## 2019-10-27 VITALS
SYSTOLIC BLOOD PRESSURE: 126 MMHG | TEMPERATURE: 97.8 F | RESPIRATION RATE: 16 BRPM | DIASTOLIC BLOOD PRESSURE: 87 MMHG | HEART RATE: 72 BPM | OXYGEN SATURATION: 99 %

## 2019-10-27 DIAGNOSIS — J20.9 ACUTE BRONCHITIS WITH SYMPTOMS > 10 DAYS: Primary | ICD-10-CM

## 2019-10-27 PROCEDURE — 99213 OFFICE O/P EST LOW 20 MIN: CPT | Performed by: INTERNAL MEDICINE

## 2019-10-27 RX ORDER — AZITHROMYCIN 250 MG/1
TABLET, FILM COATED ORAL
Qty: 6 TABLET | Refills: 0 | Status: SHIPPED | OUTPATIENT
Start: 2019-10-27 | End: 2020-01-07

## 2019-10-27 RX ORDER — GUAIFENESIN/DEXTROMETHORPHAN 100-10MG/5
5 SYRUP ORAL EVERY 4 HOURS PRN
COMMUNITY
End: 2020-01-07

## 2019-10-27 ASSESSMENT — PAIN SCALES - GENERAL: PAINLEVEL: MILD PAIN (3)

## 2019-10-27 NOTE — PROGRESS NOTES
[facility]/ [facility] Care Progress Note        Elizabeth Hart MD, MPH  10/27/2019        History:      Marie Yeboah is a pleasant 29 year old year old female with a chief complaint of nasal congestion and cough since 12 days ago.   No fever or chills.   No dyspnea or wheezing.   No smoking history.   No headache or neck pain.  No GI or  symptoms.   No MSK symptoms.         Assessment and Plan:         Acute bronchitis with symptoms > 10 days    - azithromycin (ZITHROMAX) 250 MG tablet; Two tablets first day, then one tablet daily for four days.  Dispense: 6 tablet; Refill: 0    Discussed supportive care with the patient  Advised to increase fluid intake and rest.  Tylenol for pain q 6 hours prn  F/u w PCP in 4-5 days, earlier if symptoms worsen.                   Physical Exam:      /87   Pulse 72   Temp 97.8  F (36.6  C) (Temporal)   Resp 16   LMP 10/06/2019 (Approximate)   SpO2 99%   Breastfeeding? No      Constitutional: Patient is in no distress The patient is pleasant and cooperative.   HEENT: Head:  Head is atraumatic, normocephalic.    Eyes: Pupils are equal, round and reactive to light and accomodation.  Sclera is non-icteric. No conjunctival injection, or exudate noted. Extraocular motion is intact. Visual acuity is intact bilaterally.  Ears:  External acoustic canals are patent and clear.  There is no erythema and bulging( exudate)  of the ( R/L ) tympanic membrane(s ).   Nose:  Nasal congestion w/o drainage or mucosal ulceration is noted.  Throat:  Oral mucosa is moist.  No oral lesions are noted. Posterior pharynx is clear.     Neck Supple.  There is no cervical lymphadenopathy.  No nuchal rigidity noted.  There is no meningismus.     Cardiovascular: Heart is regular to rate and rhythm.  No murmur is noted.     Lungs: Clear in the anterior and posterior pulmonary fields.   Abdomen: Soft and non-tender.    Back No flank tenderness is noted.   Extremeties No edema, no  calf tenderness.   Neuro: No focal deficit.   Skin No petechiae or purpura is noted.  There is no rash.   Mood Normal              Data:      All new lab and imaging data was reviewed.   Results for orders placed or performed in visit on 10/16/19   RAPID STREP SCREEN   Result Value Ref Range    Rapid Strep A Screen negative neg   BETA STREP GROUP A R/O CULTURE   Result Value Ref Range    Specimen Description Throat     Culture Micro No beta hemolytic Streptococcus Group A isolated

## 2019-10-29 ENCOUNTER — HOSPITAL ENCOUNTER (OUTPATIENT)
Dept: PHYSICAL THERAPY | Facility: CLINIC | Age: 29
Setting detail: THERAPIES SERIES
End: 2019-10-29
Attending: NURSE PRACTITIONER
Payer: COMMERCIAL

## 2019-10-29 PROCEDURE — 97110 THERAPEUTIC EXERCISES: CPT | Mod: GP

## 2019-10-29 PROCEDURE — 97112 NEUROMUSCULAR REEDUCATION: CPT | Mod: GP

## 2019-11-01 ENCOUNTER — THERAPY VISIT (OUTPATIENT)
Dept: CHIROPRACTIC MEDICINE | Facility: CLINIC | Age: 29
End: 2019-11-01
Payer: COMMERCIAL

## 2019-11-01 DIAGNOSIS — S13.4XXA SPRAIN OF LIGAMENTS OF CERVICAL SPINE: ICD-10-CM

## 2019-11-01 DIAGNOSIS — M99.03 SEGMENTAL DYSFUNCTION OF LUMBAR REGION: ICD-10-CM

## 2019-11-01 DIAGNOSIS — M99.04 SEGMENTAL DYSFUNCTION OF SACRAL REGION: ICD-10-CM

## 2019-11-01 DIAGNOSIS — M99.02 SEGMENTAL DYSFUNCTION OF THORACIC REGION: ICD-10-CM

## 2019-11-01 DIAGNOSIS — M62.838 MUSCLE SPASM: ICD-10-CM

## 2019-11-01 DIAGNOSIS — M99.01 SEGMENTAL DYSFUNCTION OF CERVICAL REGION: Primary | ICD-10-CM

## 2019-11-01 PROCEDURE — 98941 CHIROPRACT MANJ 3-4 REGIONS: CPT | Mod: AT | Performed by: CHIROPRACTOR

## 2019-11-01 NOTE — PROGRESS NOTES
Visit #:  2 of 12 based on treatment plan 8/2/2019    Subjective:  Marie Yeboah is a 29 year old female who is seen in f/u up for:        Segmental dysfunction of cervical region  Segmental dysfunction of thoracic region  Segmental dysfunction of lumbar region  Segmental dysfunction of sacral region  Sprain of ligaments of cervical spine  Muscle spasm.     Since last visit on 10/25/2019,  Marie Yeboah reports the following changes: Patient presents and states that she is doing okay lately. She is tight and sore in her back, but feels a bit better. Her lower back is sore from walking for Halloween. She has had some short-lived dizzy spells since her last visit.       Objective:  The following was observed:      P: palpatory tenderness L upper traps    A: static palpation demonstrates intersegmental asymmetry     R: motion palpation notes restricted motion    T: localized muscle spasm at: Sub-occipital and Traps L>>R      Assessment:    Segmental spinal dysfunction/restrictions found at:  C1 LR, RRR Gentle mobs/Activator  C5 LR, RRR Activator  T5 E, FR  T10 E, FR  Left SI posterior      Diagnoses:      1. Segmental dysfunction of cervical region    2. Segmental dysfunction of thoracic region    3. Segmental dysfunction of lumbar region    4. Segmental dysfunction of sacral region    5. Sprain of ligaments of cervical spine    6. Muscle spasm        Patient's condition:  Patient had restrictions pre-manipulation and Patient had decreased motion prior to manipulation    Treatment effectiveness:  Post manipulation there is better intersegmental movement and Patient claims to feel looser post manipulation      Procedures:  CMT:  77922 Chiropractic manipulative treatment 3-4 regions performed   Cervical: Activator, C1, C5, Prone (C5 gentle mobs)  Thoracic: Diversified, T1, T5, T10,  Prone  Pelvis: Drop assist, Left SI, Prone    Modalities:  MSTM to affected musculature, on Left WITH [employer]Gel/HYPERVOLT      Therapeutic  procedures:  Therapeutic Exercise: Cervical stretches and Bruegger's Relief  Gave patient Ice instructions post adjustment, and instructions for acute care      Prognosis: Good    Progress towards Goals: Patient is making progress towards the goal of:  Decrease pain in neck and upper back from 7/10 to 3/10 in 4 treatments. GOAL MET  Reduce functional impairment in Neck Disability from 58% to 25% in 4 treatments.  Reduce intensity and frequency of headache. GOAL MET  Return to work. GOAL MET  Decrease hypertonicity in left upper traps and cervical musculature.        Response to Treatment:   Patient tolerated treatment well today and is improving with care.        Recommendations:    Instructions:ice 20 minutes every other hour as needed    Follow-up:  Return to care next week.

## 2019-11-04 ENCOUNTER — HOSPITAL ENCOUNTER (OUTPATIENT)
Dept: PHYSICAL THERAPY | Facility: CLINIC | Age: 29
Setting detail: THERAPIES SERIES
End: 2019-11-04
Attending: NURSE PRACTITIONER
Payer: COMMERCIAL

## 2019-11-04 PROCEDURE — 97110 THERAPEUTIC EXERCISES: CPT | Mod: GP

## 2019-11-04 PROCEDURE — 97140 MANUAL THERAPY 1/> REGIONS: CPT | Mod: GP

## 2019-11-06 ENCOUNTER — THERAPY VISIT (OUTPATIENT)
Dept: CHIROPRACTIC MEDICINE | Facility: CLINIC | Age: 29
End: 2019-11-06
Payer: COMMERCIAL

## 2019-11-06 DIAGNOSIS — M99.01 SEGMENTAL DYSFUNCTION OF CERVICAL REGION: Primary | ICD-10-CM

## 2019-11-06 DIAGNOSIS — S13.4XXA SPRAIN OF LIGAMENTS OF CERVICAL SPINE: ICD-10-CM

## 2019-11-06 DIAGNOSIS — M99.03 SEGMENTAL DYSFUNCTION OF LUMBAR REGION: ICD-10-CM

## 2019-11-06 DIAGNOSIS — M99.02 SEGMENTAL DYSFUNCTION OF THORACIC REGION: ICD-10-CM

## 2019-11-06 DIAGNOSIS — M62.838 MUSCLE SPASM: ICD-10-CM

## 2019-11-06 DIAGNOSIS — M99.04 SEGMENTAL DYSFUNCTION OF SACRAL REGION: ICD-10-CM

## 2019-11-06 PROCEDURE — 98941 CHIROPRACT MANJ 3-4 REGIONS: CPT | Mod: AT | Performed by: CHIROPRACTOR
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
## 2019-11-07 ENCOUNTER — HOSPITAL ENCOUNTER (OUTPATIENT)
Dept: PHYSICAL THERAPY | Facility: CLINIC | Age: 29
Setting detail: THERAPIES SERIES
End: 2019-11-07
Attending: NURSE PRACTITIONER
Payer: COMMERCIAL

## 2019-11-07 PROCEDURE — 97110 THERAPEUTIC EXERCISES: CPT | Mod: GP

## 2019-11-07 PROCEDURE — 97140 MANUAL THERAPY 1/> REGIONS: CPT | Mod: GP

## 2019-11-13 ENCOUNTER — THERAPY VISIT (OUTPATIENT)
Dept: CHIROPRACTIC MEDICINE | Facility: CLINIC | Age: 29
End: 2019-11-13
Payer: COMMERCIAL

## 2019-11-13 DIAGNOSIS — M62.838 MUSCLE SPASM: ICD-10-CM

## 2019-11-13 DIAGNOSIS — M99.02 SEGMENTAL DYSFUNCTION OF THORACIC REGION: ICD-10-CM

## 2019-11-13 DIAGNOSIS — S13.4XXA SPRAIN OF LIGAMENTS OF CERVICAL SPINE: ICD-10-CM

## 2019-11-13 DIAGNOSIS — M99.01 SEGMENTAL DYSFUNCTION OF CERVICAL REGION: Primary | ICD-10-CM

## 2019-11-13 DIAGNOSIS — M99.03 SEGMENTAL DYSFUNCTION OF LUMBAR REGION: ICD-10-CM

## 2019-11-13 DIAGNOSIS — M99.04 SEGMENTAL DYSFUNCTION OF SACRAL REGION: ICD-10-CM

## 2019-11-13 PROCEDURE — 98941 CHIROPRACT MANJ 3-4 REGIONS: CPT | Mod: AT | Performed by: CHIROPRACTOR

## 2019-11-13 NOTE — PROGRESS NOTES
"Visit #:  4 of 12 based on treatment plan 8/2/2019    Subjective:  Marie Yeboah is a 29 year old female who is seen in f/u up for:        Segmental dysfunction of cervical region  Segmental dysfunction of thoracic region  Segmental dysfunction of lumbar region  Segmental dysfunction of sacral region  Sprain of ligaments of cervical spine  Muscle spasm.     Since last visit on 11/6/2019,  Marie Yeboah reports the following changes: Patient presents and states that she is doing okay. She is scheduled to see Dr. Garcia next week. She is still foggy and \"in her own world.\" She is having headaches and had a \"bad dizzy spell for the day on Monday.\" She currently has pain rated 3/10 on the left side.       Objective:  The following was observed:      P: palpatory tenderness L upper traps    A: static palpation demonstrates intersegmental asymmetry     R: motion palpation notes restricted motion    T: localized muscle spasm at: Sub-occipital and Traps L>>R      Assessment:    Segmental spinal dysfunction/restrictions found at:  C1 LR, RRR  T1 RR, LRR  T5 E, FR  T10 E, FR  Left SI posterior      Diagnoses:      1. Segmental dysfunction of cervical region    2. Segmental dysfunction of thoracic region    3. Segmental dysfunction of lumbar region    4. Segmental dysfunction of sacral region    5. Sprain of ligaments of cervical spine    6. Muscle spasm        Patient's condition:  Patient had restrictions pre-manipulation and Patient had decreased motion prior to manipulation    Treatment effectiveness:  Post manipulation there is better intersegmental movement and Patient claims to feel looser post manipulation      Procedures:  CMT:  70097 Chiropractic manipulative treatment 3-4 regions performed   Cervical: Activator, C1,  Prone   Thoracic: Diversified, T1, T5, T10,  Prone  Pelvis: Drop assist, Left SI, Prone    Modalities:  MSTM to affected musculature, on Left WITH [employer]el/HYPERVOLT      Therapeutic " procedures:  Therapeutic Exercise: Cervical stretches and Bruegger's Relief  Gave patient Ice instructions post adjustment, and instructions for acute care      Prognosis: Good    Progress towards Goals: Patient is making progress towards the goal of:  Decrease pain in neck and upper back from 7/10 to 3/10 in 4 treatments. GOAL MET  Reduce functional impairment in Neck Disability from 58% to 25% in 4 treatments.  Reduce intensity and frequency of headache. GOAL MET  Return to work. GOAL MET  Decrease hypertonicity in left upper traps and cervical musculature.        Response to Treatment:   Patient tolerated treatment well today and is improving with care.        Recommendations:    Instructions:ice 20 minutes every other hour as needed    Follow-up:  Return to care next week. Patient referred for massage therapy and to see Dr. Garcia for concussion testing/management. She had a massage last night.

## 2019-11-14 ENCOUNTER — HOSPITAL ENCOUNTER (OUTPATIENT)
Dept: PHYSICAL THERAPY | Facility: CLINIC | Age: 29
Setting detail: THERAPIES SERIES
End: 2019-11-14
Attending: NURSE PRACTITIONER
Payer: COMMERCIAL

## 2019-11-14 PROCEDURE — 97140 MANUAL THERAPY 1/> REGIONS: CPT | Mod: GP

## 2019-11-14 PROCEDURE — 97110 THERAPEUTIC EXERCISES: CPT | Mod: GP

## 2019-11-19 ENCOUNTER — OFFICE VISIT (OUTPATIENT)
Dept: ORTHOPEDICS | Facility: CLINIC | Age: 29
End: 2019-11-19
Payer: COMMERCIAL

## 2019-11-19 VITALS
BODY MASS INDEX: 39.79 KG/M2 | DIASTOLIC BLOOD PRESSURE: 86 MMHG | SYSTOLIC BLOOD PRESSURE: 114 MMHG | WEIGHT: 253.5 LBS | HEIGHT: 67 IN

## 2019-11-19 DIAGNOSIS — S06.0X1A CONCUSSION WITH LOSS OF CONSCIOUSNESS OF 30 MINUTES OR LESS, INITIAL ENCOUNTER: Primary | ICD-10-CM

## 2019-11-19 PROCEDURE — 99244 OFF/OP CNSLTJ NEW/EST MOD 40: CPT | Performed by: PHYSICAL MEDICINE & REHABILITATION

## 2019-11-19 ASSESSMENT — MIFFLIN-ST. JEOR: SCORE: 1912.5

## 2019-11-19 NOTE — PATIENT INSTRUCTIONS
Today's Plan of Care:  -Rehab: Physical Therapy, Occupational Therapy, & Speech and Language Therapy order placed. [facility]cussion  will reach out to you to schedule  -Continue chiropractic care  -Avoid aggravating activities.    Follow Up: 1 month or sooner if symptoms fail to improve or worsen. Please call with any questions or concerns.     Concussion Vitamin Regiment  Please note that the clinical evidence regarding the use of vitamin supplements after sustaining a concussion is evolving.  These are recommendations based on previous experience with patients suffering from a concusion.    Cognitive Function  Fish oil/Omega 3:  1,000 mg 2 tablets two times a day. Continue while symptomatic and for one-week after your symptoms resolve.    Insomnia  Melatonin: 3-5 mg 30 minutes before bedtime    Headache  Riboflavin / Vitamin B2:  200 mg twice a day  Magnesium Glycinate: 400 mg a day.           Healing After a Concussion     Rest  Rest is the best treatment for a concussion. You should avoid activities that cause your symptoms to get worse or make you feel tired. This would include physical activities as well as watching TV, texting or playing video games.    You may sleep or nap during the day as long as it does not prevent you from sleeping at night. If you find it is hard to fall asleep, talk to your doctor. You may need medicine to help you sleep.    If symptoms have not worsened, you do not need to be wakened and checked on during the night.      School  You can rest your brain by staying at home for a time. The amount of time away from school will depend on the injury and the symptoms.    At school, you may have trouble taking tests or working on a computer. Symptoms may get worse in band, choir, busy classes or a noisy lunchroom. A doctor can work with the school if you need a plan to help you succeed.    Work  You may need to change your work routine as you recover. A doctor can help you create a plan  "for the conditions at your job.      Treat pain    Take Tylenol (acetaminophen) for headaches and pain every 4 to 6 hours, as needed.    Do not take over-the-counter medicines such as ibuprofen, Advil, Motrin, Benadryl, Aleve, sleep aides or Tylenol PM. These drugs may cause new problems.    If you cannot manage your pain with Tylenol, call your doctor or go to the emergency department.      Watch symptoms closely  Each day keep track of your symptoms. This will help your doctor see how well you are healing. Write down the symptom, how often it occurs, how long it lasts, and what makes it better or worse.    Possible symptoms: headache, stomach upset, feeling confused or dizzy, motion sickness, and personality changes.      Returning to activity  Take your time returning to activity. A doctor can help determine what levels of activity are best for you. If you re returning to a sport, you should see a healthcare provider before doing so.      If you have questions, call  Concussion hotline: [phone] or Athletic medicine hotline: 786.362.1730.          For informational purposes only.  Not to replace the advice of your health care provider.  Copyright   2014 [facility].  All rights reserved.            Sleep Hygiene     What is it?    \"Sleep hygiene\" means having good sleep habits. Follow the tips below to sleep better at night.      Get on a schedule. Go to bed and get up at about the same time every day.    Listen to your body. Only try to sleep when you actually feel tired or sleepy.    Be patient. If you haven't been able to get to sleep after about 20 minutes or more, get up and do something calming or boring until you feel sleepy. Then, return to bed and try again.      Avoid caffeine (coffee, tea, cola drinks, chocolate and some medicines) for at least 4 to 6 hours before going to bed. We also suggest you don't use alcohol or nicotine (cigarettes) during this time. Both can make it harder for " "you to fall asleep and stay asleep.    Use your bed for sleeping only. That means no TV, computer or homework in bed!    Don't nap during the day. If you do nap, make sure it is for less than an hour and before 3 p.m.    Create sleep rituals that remind your body that it is time to sleep. Examples include breathing exercises, stretching, or reading a book.     Try a bath or shower before bed. Having a hot bath 1 to 2 hours before bedtime can help you feel sleepy.    Don't watch the clock. Checking the clock during the night can wake you up. It can also lead to negative thoughts such as \"I will never fall asleep.\"    Use a sleep diary. Track your sleep schedule to know your sleep patterns and to see where you can improve.    Get regular exercise. But try not to do heavy exercise in the 4 hours before bedtime.      Eat a healthy, balanced diet. Try eating a light, healthy snack before bed, but avoid eating a heavy meal.    Create the right sleeping area. A cool, dark, quiet room is best. If needed, try earplugs, fans and blackout curtains.      Keep your daytime routine the same even if you have a bad night sleep. Avoiding activities the next day can make it harder to sleep.          For informational purposes only. Not to replace the advice of your health care provider. Copyright   2013 [facility]. All rights reserved.    "
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
## 2019-11-20 ENCOUNTER — THERAPY VISIT (OUTPATIENT)
Dept: CHIROPRACTIC MEDICINE | Facility: CLINIC | Age: 29
End: 2019-11-20
Payer: COMMERCIAL

## 2019-11-20 DIAGNOSIS — M99.02 SEGMENTAL DYSFUNCTION OF THORACIC REGION: ICD-10-CM

## 2019-11-20 DIAGNOSIS — M99.04 SEGMENTAL DYSFUNCTION OF SACRAL REGION: ICD-10-CM

## 2019-11-20 DIAGNOSIS — S13.4XXA SPRAIN OF LIGAMENTS OF CERVICAL SPINE: ICD-10-CM

## 2019-11-20 DIAGNOSIS — M99.01 SEGMENTAL DYSFUNCTION OF CERVICAL REGION: Primary | ICD-10-CM

## 2019-11-20 DIAGNOSIS — M62.838 MUSCLE SPASM: ICD-10-CM

## 2019-11-20 DIAGNOSIS — M99.03 SEGMENTAL DYSFUNCTION OF LUMBAR REGION: ICD-10-CM

## 2019-11-20 PROCEDURE — 98941 CHIROPRACT MANJ 3-4 REGIONS: CPT | Mod: AT | Performed by: CHIROPRACTOR

## 2019-11-20 NOTE — PROGRESS NOTES
Visit #:  5 of 12 based on treatment plan 8/2/2019    Subjective:  Marie Yeboah is a 29 year old female who is seen in f/u up for:        Segmental dysfunction of cervical region  Segmental dysfunction of thoracic region  Segmental dysfunction of lumbar region  Segmental dysfunction of sacral region  Sprain of ligaments of cervical spine  Muscle spasm.     Since last visit on 11/13/2019,  Marie Yeboah reports the following changes: Patient presents and states that she is doing okay today. She saw Dr. Garcia yesterday, she has no balance and was told that she needs PT, OT and speech. She has been getting massages still, and is doing well. She does feel like she is making progress and has not had any dizzy spells except with testing yesterday.     Objective:  The following was observed:    P: palpatory tenderness L upper traps    A: static palpation demonstrates intersegmental asymmetry     R: motion palpation notes restricted motion    T: localized muscle spasm at: Sub-occipital and Traps L>>R      Assessment:    Segmental spinal dysfunction/restrictions found at:  C1 LR, RRR  T1 RR, LRR  T5 E, FR  T10 E, FR  Left SI posterior      Diagnoses:      1. Segmental dysfunction of cervical region    2. Segmental dysfunction of thoracic region    3. Segmental dysfunction of lumbar region    4. Segmental dysfunction of sacral region    5. Sprain of ligaments of cervical spine    6. Muscle spasm        Patient's condition:  Patient had restrictions pre-manipulation and Patient had decreased motion prior to manipulation    Treatment effectiveness:  Post manipulation there is better intersegmental movement and Patient claims to feel looser post manipulation      Procedures:  CMT:  34085 Chiropractic manipulative treatment 3-4 regions performed   Cervical: Activator, C1,  Prone   Thoracic: Diversified, T1, T5, T10,  Prone  Pelvis: Drop assist, Left SI, Prone    Modalities:  MSTM to affected musculature, on Left WITH  [employer]el/HYPERVOLT      Therapeutic procedures:  Therapeutic Exercise: Cervical stretches and Bruegger's Relief  Gave patient Ice instructions post adjustment, and instructions for acute care      Prognosis: Good    Progress towards Goals: Patient is making progress towards the goal of:  Decrease pain in neck and upper back from 7/10 to 3/10 in 4 treatments. GOAL MET  Reduce functional impairment in Neck Disability from 58% to 25% in 4 treatments.  Reduce intensity and frequency of headache. GOAL MET  Return to work. GOAL MET  Decrease hypertonicity in left upper traps and cervical musculature.        Response to Treatment:   Patient tolerated treatment well today and is improving with care. Recently diagnosed with concussion, will initiate PT, OT and speech therapy.       Recommendations:    Instructions:ice 20 minutes every other hour as needed    Follow-up:  Return to care next week. Continue in conjunction with massages, PT, OT and speech therapy.

## 2019-11-21 ENCOUNTER — HOSPITAL ENCOUNTER (OUTPATIENT)
Dept: PHYSICAL THERAPY | Facility: CLINIC | Age: 29
Setting detail: THERAPIES SERIES
End: 2019-11-21
Attending: NURSE PRACTITIONER
Payer: COMMERCIAL

## 2019-11-21 PROCEDURE — 97110 THERAPEUTIC EXERCISES: CPT | Mod: GP

## 2019-11-21 PROCEDURE — 97140 MANUAL THERAPY 1/> REGIONS: CPT | Mod: GP
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
## 2019-11-22 ENCOUNTER — HOSPITAL ENCOUNTER (OUTPATIENT)
Dept: SPEECH THERAPY | Facility: CLINIC | Age: 29
Setting detail: THERAPIES SERIES
End: 2019-11-22
Attending: PHYSICAL MEDICINE & REHABILITATION
Payer: COMMERCIAL

## 2019-11-22 ENCOUNTER — MYC MEDICAL ADVICE (OUTPATIENT)
Dept: PODIATRY | Facility: CLINIC | Age: 29
End: 2019-11-22

## 2019-11-22 PROCEDURE — 96125 COGNITIVE TEST BY HC PRO: CPT | Mod: GN | Performed by: SPEECH-LANGUAGE PATHOLOGIST

## 2019-11-22 NOTE — PROGRESS NOTES
Repeatable Battery for the Assessment of Neuropsychological Status Update   (RBANS  Update)  The Repeatable Battery for the Assessment of Neuropsychological Status Update (RBANS  Update) was administered to Marie Yeboah on 11/22/2019.  The RBANS Update was originally developed with a primary focus on assessment of dementia, and measures cognitive decline or improvement across these domains: immediate memory, visuospatial/constructional; language; attention; and delayed memory.  However, special group studies are available for Alzheimer's Disease, Vascular Dementia, HIV Dementia, Nicholas's Disease, Parkinson's Disease, Depression, Schizophrenia, and Closed Head Injury.   The RBANS can be used by clinicians and neuropsychologists to help screen for deficits in acute-care settings; track recovery during rehabilitation; track progression of neurological disorders; and screen for neurocognitive status in adolescents.  This test is normed for ages 12-89.  The subtest normative data are presented in scaled score units that have a mean of 10 and a standard deviation of 3.          Total Score Scaled Score  Percentile Group       I.  Immediate memory    1.  List Learning   21  4   2.  Story Memory   15  6  Immediate Memory Index Score  = 76    II.  Visuospatial/Constructional    3.  Figure copy   20  12  4.  Line Orientation   19     51-75  Visuospatial/Constructional Index Score  = 112    III.  Language     5.  Picture Naming   9     17-25  6.  Semantic Fluency   17  5  Language Index Score = 87    IV.  Attention  7.  Digit Span     8  5  8.  Coding     39  4  Attention Index Score = 64    V.  Delayed Memory  9.  List recall    3     <2  10. List Recognition   16     <2  11. Story Recall   5  4  12. Figure Recall   14  8  Delayed Memory Index Score = 56  Sum of Total Scores for Subtest 9+11+12 22    Sum of Index Scores = 395  Total Scale Score = 74      INTERPRETATION OF TEST RESULTS: Patient presents with moderate  cognitive linguistic deficits. She scored in the severe deficit range for attention and delayed memory and moderate deficit range for the immediate memory subtest. Her scores on the visuospacial/constructional and language subtests of the RBANS placed her within the normal range. Her total scaled score was 74, which places her at the 4th percentile compared to same aged peers. These scores reflect cognitive linguistic deficits which limit Marie's ability to complete work and everyday tasks. Patient will benefit from skilled services targeting cognitive linguistic deficits in order to improve functioning in daily activities.      Repeatable Battery for the Assessment of Neuropsychological Status Update (RBANS Update). Copyright   2012 [facility] Adapted and reproduced with permission. All rights reserved.
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
## 2019-11-25 ENCOUNTER — HOSPITAL ENCOUNTER (OUTPATIENT)
Dept: PHYSICAL THERAPY | Facility: CLINIC | Age: 29
Setting detail: THERAPIES SERIES
End: 2019-11-25
Attending: PHYSICAL MEDICINE & REHABILITATION
Payer: COMMERCIAL

## 2019-11-25 PROCEDURE — 97162 PT EVAL MOD COMPLEX 30 MIN: CPT | Mod: GP | Performed by: PHYSICAL THERAPIST

## 2019-11-25 PROCEDURE — 97112 NEUROMUSCULAR REEDUCATION: CPT | Mod: GP | Performed by: PHYSICAL THERAPIST

## 2019-11-26 ENCOUNTER — HOSPITAL ENCOUNTER (OUTPATIENT)
Dept: SPEECH THERAPY | Facility: CLINIC | Age: 29
Setting detail: THERAPIES SERIES
End: 2019-11-26
Attending: PHYSICAL MEDICINE & REHABILITATION
Payer: COMMERCIAL

## 2019-11-26 PROCEDURE — 97127 ZZHC SP THERAPEUTIC INTERVENTION W/FOCUS ON COGNITIVE FUNCTION,EA 15 MIN: CPT | Mod: GN | Performed by: SPEECH-LANGUAGE PATHOLOGIST

## 2019-11-27 ENCOUNTER — THERAPY VISIT (OUTPATIENT)
Dept: CHIROPRACTIC MEDICINE | Facility: CLINIC | Age: 29
End: 2019-11-27
Payer: COMMERCIAL

## 2019-11-27 DIAGNOSIS — M99.03 SEGMENTAL DYSFUNCTION OF LUMBAR REGION: ICD-10-CM

## 2019-11-27 DIAGNOSIS — M62.838 MUSCLE SPASM: ICD-10-CM

## 2019-11-27 DIAGNOSIS — S13.4XXA SPRAIN OF LIGAMENTS OF CERVICAL SPINE: ICD-10-CM

## 2019-11-27 DIAGNOSIS — M99.02 SEGMENTAL DYSFUNCTION OF THORACIC REGION: ICD-10-CM

## 2019-11-27 DIAGNOSIS — M99.04 SEGMENTAL DYSFUNCTION OF SACRAL REGION: ICD-10-CM

## 2019-11-27 DIAGNOSIS — M99.01 SEGMENTAL DYSFUNCTION OF CERVICAL REGION: Primary | ICD-10-CM

## 2019-11-27 PROCEDURE — 98941 CHIROPRACT MANJ 3-4 REGIONS: CPT | Mod: AT | Performed by: CHIROPRACTOR

## 2019-11-27 NOTE — PROGRESS NOTES
Visit #:  6 of 12 based on treatment plan 8/2/2019    Subjective:  Marie Yeboah is a 29 year old female who is seen in f/u up for:     Data Unavailable.     Since last visit on 11/20/2019,  Marie Yeboah reports the following changes: Patient presents and states that she is doing okay today. She has had a lot of appts this week, so she feels a little overwhelmed. She has a hard time keeping track of everything. She gets more dizzy the more she is working her brain. She has been more dizzy this week, but only because of all the appts. She has been having headaches this week. The pain in her left shoulder is rated 2/10, that seems to be improving. Her massages are going well. They are doing eye and neck in PT, she starts OT next week.         Objective:  The following was observed:    P: palpatory tenderness L upper traps    A: static palpation demonstrates intersegmental asymmetry     R: motion palpation notes restricted motion    T: localized muscle spasm at: Sub-occipital and Traps L>>R      Assessment:    Segmental spinal dysfunction/restrictions found at:  C1 RR, LRR  C5 LR, RRR  T1 LR, RRR  T5 E, FR  T10 E, FR  Right SI posterior      Diagnoses:      No diagnosis found.    Patient's condition:  Patient had restrictions pre-manipulation and Patient had decreased motion prior to manipulation    Treatment effectiveness:  Post manipulation there is better intersegmental movement and Patient claims to feel looser post manipulation      Procedures:  CMT:  22936 Chiropractic manipulative treatment 3-4 regions performed   Cervical: Activator, C1, C5, Seated  Thoracic: Diversified, T1, T5, T10,  Prone  Pelvis: Drop assist, Right SI, Prone    Modalities:  MSTM to affected musculature, on Left WITH [employer]/HYPERVOLT      Therapeutic procedures:  Therapeutic Exercise: Cervical stretches and Bruegger's Relief  Gave patient Ice instructions post adjustment, and instructions for acute care      Prognosis: Good    Progress  towards Goals: Patient is making progress towards the goal of:  Decrease pain in neck and upper back from 7/10 to 3/10 in 4 treatments. GOAL MET  Reduce functional impairment in Neck Disability from 58% to 25% in 4 treatments.  Reduce intensity and frequency of headache. GOAL MET  Return to work. GOAL MET  Decrease hypertonicity in left upper traps and cervical musculature.        Response to Treatment:   Patient tolerated treatment well today and is improving with care. Recently diagnosed with concussion, will initiate PT, OT and speech therapy.       Recommendations:    Instructions:ice 20 minutes every other hour as needed    Follow-up:  Return to care next week. Continue in conjunction with massages, PT, OT and speech therapy.
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
## 2019-12-02 ENCOUNTER — HOSPITAL ENCOUNTER (OUTPATIENT)
Dept: PHYSICAL THERAPY | Facility: CLINIC | Age: 29
Setting detail: THERAPIES SERIES
End: 2019-12-02
Attending: PHYSICAL MEDICINE & REHABILITATION
Payer: COMMERCIAL

## 2019-12-02 PROCEDURE — 97110 THERAPEUTIC EXERCISES: CPT | Mod: GP | Performed by: PHYSICAL THERAPIST

## 2019-12-02 PROCEDURE — 97140 MANUAL THERAPY 1/> REGIONS: CPT | Mod: GP | Performed by: PHYSICAL THERAPIST

## 2019-12-04 ENCOUNTER — HOSPITAL ENCOUNTER (OUTPATIENT)
Dept: OCCUPATIONAL THERAPY | Facility: CLINIC | Age: 29
Setting detail: THERAPIES SERIES
End: 2019-12-04
Attending: PHYSICAL MEDICINE & REHABILITATION
Payer: COMMERCIAL

## 2019-12-04 ENCOUNTER — OFFICE VISIT (OUTPATIENT)
Dept: PODIATRY | Facility: CLINIC | Age: 29
End: 2019-12-04
Payer: COMMERCIAL

## 2019-12-04 ENCOUNTER — THERAPY VISIT (OUTPATIENT)
Dept: CHIROPRACTIC MEDICINE | Facility: CLINIC | Age: 29
End: 2019-12-04
Payer: COMMERCIAL

## 2019-12-04 ENCOUNTER — ANCILLARY PROCEDURE (OUTPATIENT)
Dept: GENERAL RADIOLOGY | Facility: CLINIC | Age: 29
End: 2019-12-04
Attending: PODIATRIST
Payer: COMMERCIAL

## 2019-12-04 VITALS
SYSTOLIC BLOOD PRESSURE: 132 MMHG | WEIGHT: 253.5 LBS | HEIGHT: 67 IN | DIASTOLIC BLOOD PRESSURE: 86 MMHG | BODY MASS INDEX: 39.79 KG/M2

## 2019-12-04 DIAGNOSIS — M99.02 SEGMENTAL DYSFUNCTION OF THORACIC REGION: ICD-10-CM

## 2019-12-04 DIAGNOSIS — M99.04 SEGMENTAL DYSFUNCTION OF SACRAL REGION: ICD-10-CM

## 2019-12-04 DIAGNOSIS — M76.821 POSTERIOR TIBIAL TENDONITIS, RIGHT: ICD-10-CM

## 2019-12-04 DIAGNOSIS — Q74.2 ACCESSORY NAVICULAR BONE OF RIGHT FOOT: ICD-10-CM

## 2019-12-04 DIAGNOSIS — S92.511A CLOSED DISPLACED FRACTURE OF PROXIMAL PHALANX OF LESSER TOE OF RIGHT FOOT, INITIAL ENCOUNTER: Primary | ICD-10-CM

## 2019-12-04 DIAGNOSIS — M99.03 SEGMENTAL DYSFUNCTION OF LUMBAR REGION: ICD-10-CM

## 2019-12-04 DIAGNOSIS — M99.01 SEGMENTAL DYSFUNCTION OF CERVICAL REGION: Primary | ICD-10-CM

## 2019-12-04 DIAGNOSIS — S13.4XXA SPRAIN OF LIGAMENTS OF CERVICAL SPINE: ICD-10-CM

## 2019-12-04 DIAGNOSIS — M62.838 MUSCLE SPASM: ICD-10-CM

## 2019-12-04 DIAGNOSIS — S92.511A CLOSED DISPLACED FRACTURE OF PROXIMAL PHALANX OF LESSER TOE OF RIGHT FOOT, INITIAL ENCOUNTER: ICD-10-CM

## 2019-12-04 PROCEDURE — 98941 CHIROPRACT MANJ 3-4 REGIONS: CPT | Mod: AT | Performed by: CHIROPRACTOR

## 2019-12-04 PROCEDURE — 97166 OT EVAL MOD COMPLEX 45 MIN: CPT | Mod: GO

## 2019-12-04 PROCEDURE — 73630 X-RAY EXAM OF FOOT: CPT | Mod: TC

## 2019-12-04 PROCEDURE — 97535 SELF CARE MNGMENT TRAINING: CPT | Mod: GO

## 2019-12-04 PROCEDURE — 99213 OFFICE O/P EST LOW 20 MIN: CPT | Performed by: PODIATRIST

## 2019-12-04 ASSESSMENT — MIFFLIN-ST. JEOR: SCORE: 1912.5

## 2019-12-04 ASSESSMENT — PAIN SCALES - GENERAL: PAINLEVEL: MODERATE PAIN (4)
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
## 2019-12-04 NOTE — PROGRESS NOTES
Visit #:  7 of 12 based on treatment plan 8/2/2019    Subjective:  Marie Yeboah is a 29 year old female who is seen in f/u up for:        Segmental dysfunction of cervical region  Segmental dysfunction of thoracic region  Segmental dysfunction of lumbar region  Segmental dysfunction of sacral region  Sprain of ligaments of cervical spine  Muscle spasm.     Since last visit on 11/27/2019,  Marie Yeboah reports the following changes: Patient presents and states that she is doing okay. She feels dizzy today, but that is typical towards the end of the day. Her eyes feel like they have been worked since OT. She is still getting headaches. Anytime she does any of her therapies, she gets the dizziness. Her neck pain is a little worse since last time. She has days where she feels like her pain won't get any better. Massage was able to go deeper her last treatment.         Objective:  The following was observed:    P: palpatory tenderness L upper traps    A: static palpation demonstrates intersegmental asymmetry     R: motion palpation notes restricted motion    T: localized muscle spasm at: Sub-occipital and Traps L>>R      Assessment:    Segmental spinal dysfunction/restrictions found at:  C1 LR, RRR  T1 RR, LRR  T5 E, FR  T10 E, FR  Right SI posterior      Diagnoses:      1. Segmental dysfunction of cervical region    2. Segmental dysfunction of thoracic region    3. Segmental dysfunction of lumbar region    4. Segmental dysfunction of sacral region    5. Sprain of ligaments of cervical spine    6. Muscle spasm        Patient's condition:  Patient had restrictions pre-manipulation and Patient had decreased motion prior to manipulation    Treatment effectiveness:  Post manipulation there is better intersegmental movement and Patient claims to feel looser post manipulation      Procedures:  CMT:  45928 Chiropractic manipulative treatment 3-4 regions performed   Cervical: Activator, C1, Seated  Thoracic: Diversified, T1,  T5, T10,  Prone  Pelvis: Drop assist, Right SI, Prone    Modalities:  MSTM to affected musculature, on Left WITH [employer]/HYPERVOLT      Therapeutic procedures:  Therapeutic Exercise: Cervical stretches and Bruegger's Relief  Gave patient Ice instructions post adjustment, and instructions for acute care      Prognosis: Good    Progress towards Goals: Patient is making progress towards the goal of:  Decrease pain in neck and upper back from 7/10 to 3/10 in 4 treatments. GOAL MET  Reduce functional impairment in Neck Disability from 58% to 25% in 4 treatments.  Reduce intensity and frequency of headache. GOAL MET  Return to work. GOAL MET  Decrease hypertonicity in left upper traps and cervical musculature.        Response to Treatment:   Patient tolerated treatment well today and is improving with care. Recently diagnosed with concussion, will initiate PT, OT and speech therapy.       Recommendations:    Instructions:ice 20 minutes every other hour as needed    Follow-up:  Return to care next week. Continue in conjunction with massages, PT, OT and speech therapy.
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
## 2019-12-06 ENCOUNTER — HOSPITAL ENCOUNTER (OUTPATIENT)
Dept: SPEECH THERAPY | Facility: CLINIC | Age: 29
Setting detail: THERAPIES SERIES
End: 2019-12-06
Attending: PHYSICAL MEDICINE & REHABILITATION
Payer: COMMERCIAL

## 2019-12-06 PROCEDURE — 97127 ZZHC SP THERAPEUTIC INTERVENTION W/FOCUS ON COGNITIVE FUNCTION,EA 15 MIN: CPT | Mod: GN | Performed by: SPEECH-LANGUAGE PATHOLOGIST

## 2019-12-11 ENCOUNTER — HOSPITAL ENCOUNTER (OUTPATIENT)
Dept: PHYSICAL THERAPY | Facility: CLINIC | Age: 29
Setting detail: THERAPIES SERIES
End: 2019-12-11
Attending: PHYSICAL MEDICINE & REHABILITATION
Payer: COMMERCIAL

## 2019-12-11 ENCOUNTER — THERAPY VISIT (OUTPATIENT)
Dept: CHIROPRACTIC MEDICINE | Facility: CLINIC | Age: 29
End: 2019-12-11
Payer: COMMERCIAL

## 2019-12-11 ENCOUNTER — HOSPITAL ENCOUNTER (OUTPATIENT)
Dept: OCCUPATIONAL THERAPY | Facility: CLINIC | Age: 29
Setting detail: THERAPIES SERIES
End: 2019-12-11
Attending: PHYSICAL MEDICINE & REHABILITATION
Payer: COMMERCIAL

## 2019-12-11 DIAGNOSIS — M99.02 SEGMENTAL DYSFUNCTION OF THORACIC REGION: ICD-10-CM

## 2019-12-11 DIAGNOSIS — M62.838 MUSCLE SPASM: ICD-10-CM

## 2019-12-11 DIAGNOSIS — M99.04 SEGMENTAL DYSFUNCTION OF SACRAL REGION: ICD-10-CM

## 2019-12-11 DIAGNOSIS — S13.4XXA SPRAIN OF LIGAMENTS OF CERVICAL SPINE: ICD-10-CM

## 2019-12-11 DIAGNOSIS — M99.03 SEGMENTAL DYSFUNCTION OF LUMBAR REGION: ICD-10-CM

## 2019-12-11 DIAGNOSIS — M99.01 SEGMENTAL DYSFUNCTION OF CERVICAL REGION: Primary | ICD-10-CM

## 2019-12-11 PROCEDURE — 97535 SELF CARE MNGMENT TRAINING: CPT | Mod: GO

## 2019-12-11 PROCEDURE — 97110 THERAPEUTIC EXERCISES: CPT | Mod: GP | Performed by: PHYSICAL THERAPIST

## 2019-12-11 PROCEDURE — 97140 MANUAL THERAPY 1/> REGIONS: CPT | Mod: GP | Performed by: PHYSICAL THERAPIST

## 2019-12-11 PROCEDURE — 97112 NEUROMUSCULAR REEDUCATION: CPT | Mod: GO

## 2019-12-11 PROCEDURE — 98941 CHIROPRACT MANJ 3-4 REGIONS: CPT | Mod: AT | Performed by: CHIROPRACTOR

## 2019-12-11 NOTE — PROGRESS NOTES
Visit #:  8 of 12 based on treatment plan 8/2/2019    Subjective:  Marie Yeboah is a 29 year old female who is seen in f/u up for:        Segmental dysfunction of cervical region  Segmental dysfunction of thoracic region  Segmental dysfunction of lumbar region  Segmental dysfunction of sacral region  Sprain of ligaments of cervical spine  Muscle spasm.     Since last visit on 12/4/2019,  Marie Yeboah reports the following changes: Patient presents and states that she is doing better! She just had a massage last night, and feels like she is improving. She had a really bad headache over the weekend, and almost came in, but then when it went away finally, she has felt better since! She has not had any symptoms since and woke up feeling good today.       Objective:  The following was observed:    P: palpatory tenderness L upper traps    A: static palpation demonstrates intersegmental asymmetry     R: motion palpation notes restricted motion    T: localized muscle spasm at: Sub-occipital and Traps L>>R      Assessment:    Segmental spinal dysfunction/restrictions found at:  C1 LR, RRR  T1 RR, LRR  T5 E, FR  T10 E, FR  Right SI posterior      Diagnoses:      1. Segmental dysfunction of cervical region    2. Segmental dysfunction of thoracic region    3. Segmental dysfunction of lumbar region    4. Segmental dysfunction of sacral region    5. Sprain of ligaments of cervical spine    6. Muscle spasm        Patient's condition:  Patient had restrictions pre-manipulation and Patient had decreased motion prior to manipulation    Treatment effectiveness:  Post manipulation there is better intersegmental movement and Patient claims to feel looser post manipulation      Procedures:  CMT:  40115 Chiropractic manipulative treatment 3-4 regions performed   Cervical: Gentle Mobilization, C1, Supine  Thoracic: Diversified, T1, T5, T10,  Prone  Pelvis: Drop assist, Right SI, Prone    Modalities:  MSTM to affected musculature, on  Left WITH [employer]/HYPERVOLT      Therapeutic procedures:  Therapeutic Exercise: Cervical stretches and Bruegger's Relief  Gave patient Ice instructions post adjustment, and instructions for acute care      Prognosis: Good    Progress towards Goals: Patient is making progress towards the goal of:  Decrease pain in neck and upper back from 7/10 to 3/10 in 4 treatments. GOAL MET  Reduce functional impairment in Neck Disability from 58% to 25% in 4 treatments.  Reduce intensity and frequency of headache. GOAL MET  Return to work. GOAL MET  Decrease hypertonicity in left upper traps and cervical musculature.        Response to Treatment:   Patient tolerated treatment well today and is improving with care. Recently diagnosed with concussion, will initiate PT, OT and speech therapy.       Recommendations:    Instructions:ice 20 minutes every other hour as needed    Follow-up:  Return to care next week. Continue in conjunction with massages, PT, OT and speech therapy.

## 2019-12-18 ENCOUNTER — HOSPITAL ENCOUNTER (OUTPATIENT)
Dept: SPEECH THERAPY | Facility: CLINIC | Age: 29
Setting detail: THERAPIES SERIES
End: 2019-12-18
Attending: PHYSICAL MEDICINE & REHABILITATION
Payer: COMMERCIAL

## 2019-12-18 PROCEDURE — 97127 ZZHC SP THERAPEUTIC INTERVENTION W/FOCUS ON COGNITIVE FUNCTION,EA 15 MIN: CPT | Mod: GN | Performed by: SPEECH-LANGUAGE PATHOLOGIST

## 2019-12-19 ENCOUNTER — HOSPITAL ENCOUNTER (OUTPATIENT)
Dept: OCCUPATIONAL THERAPY | Facility: CLINIC | Age: 29
Setting detail: THERAPIES SERIES
End: 2019-12-19
Attending: PHYSICAL MEDICINE & REHABILITATION
Payer: COMMERCIAL

## 2019-12-19 PROCEDURE — 97112 NEUROMUSCULAR REEDUCATION: CPT | Mod: GO

## 2019-12-19 PROCEDURE — 97535 SELF CARE MNGMENT TRAINING: CPT | Mod: GO

## 2019-12-20 ENCOUNTER — THERAPY VISIT (OUTPATIENT)
Dept: CHIROPRACTIC MEDICINE | Facility: CLINIC | Age: 29
End: 2019-12-20
Payer: COMMERCIAL

## 2019-12-20 DIAGNOSIS — M99.01 SEGMENTAL DYSFUNCTION OF CERVICAL REGION: Primary | ICD-10-CM

## 2019-12-20 DIAGNOSIS — M99.04 SEGMENTAL DYSFUNCTION OF SACRAL REGION: ICD-10-CM

## 2019-12-20 DIAGNOSIS — M99.03 SEGMENTAL DYSFUNCTION OF LUMBAR REGION: ICD-10-CM

## 2019-12-20 DIAGNOSIS — S13.4XXA SPRAIN OF LIGAMENTS OF CERVICAL SPINE: ICD-10-CM

## 2019-12-20 DIAGNOSIS — M99.02 SEGMENTAL DYSFUNCTION OF THORACIC REGION: ICD-10-CM

## 2019-12-20 DIAGNOSIS — M62.838 MUSCLE SPASM: ICD-10-CM

## 2019-12-20 PROCEDURE — 98941 CHIROPRACT MANJ 3-4 REGIONS: CPT | Mod: AT | Performed by: CHIROPRACTOR

## 2019-12-20 NOTE — PROGRESS NOTES
"Visit #:  9 of 12 based on treatment plan 8/2/2019    Subjective:  Marie Yeboah is a 29 year old female who is seen in f/u up for:        Segmental dysfunction of cervical region  Segmental dysfunction of thoracic region  Segmental dysfunction of lumbar region  Segmental dysfunction of sacral region  Sprain of ligaments of cervical spine  Muscle spasm.     Since last visit on 12/11/2019,  Marie Yeboah reports the following changes: Patient presents and states that she is doing better! She rates her current neck pain 1/10. She had a massage on Tuesday, and her MT is noticing improvement in her muscle tone, too. She feels like she is \"turning the corner.\"      Objective:  The following was observed:    P: palpatory tenderness L upper traps    A: static palpation demonstrates intersegmental asymmetry     R: motion palpation notes restricted motion    T: localized muscle spasm at: Sub-occipital and Traps L>>R      Assessment:    Segmental spinal dysfunction/restrictions found at:  C2 LR, RRR  T1 RR, LRR  T5 E, FR  T10 E, FR  Right SI posterior      Diagnoses:      1. Segmental dysfunction of cervical region    2. Segmental dysfunction of thoracic region    3. Segmental dysfunction of lumbar region    4. Segmental dysfunction of sacral region    5. Sprain of ligaments of cervical spine    6. Muscle spasm        Patient's condition:  Patient had restrictions pre-manipulation and Patient had decreased motion prior to manipulation    Treatment effectiveness:  Post manipulation there is better intersegmental movement and Patient claims to feel looser post manipulation      Procedures:  CMT:  77013 Chiropractic manipulative treatment 3-4 regions performed   Cervical: Gentle Mobilization, C2, Supine  Thoracic: Diversified, T1, T5, T10,  Prone  Pelvis: Drop assist, Right SI, Prone    Modalities:  MSTM to affected musculature, on Left WITH [employer]/HYPERVOLT      Therapeutic procedures:  Therapeutic Exercise: Cervical " stretches and Bruegger's Relief  Gave patient Ice instructions post adjustment, and instructions for acute care      Prognosis: Good    Progress towards Goals: Patient is making progress towards the goal of:  Decrease pain in neck and upper back from 7/10 to 3/10 in 4 treatments. GOAL MET  Reduce functional impairment in Neck Disability from 58% to 25% in 4 treatments.  Reduce intensity and frequency of headache. GOAL MET  Return to work. GOAL MET  Decrease hypertonicity in left upper traps and cervical musculature.        Response to Treatment:   Patient tolerated treatment well today and is improving with care. Recently diagnosed with concussion, will initiate PT, OT and speech therapy.       Recommendations:    Instructions:ice 20 minutes every other hour as needed    Follow-up:  Return to care in 2 weeks. Continue in conjunction with massages, PT, OT and speech therapy.

## 2019-12-23 ENCOUNTER — HOSPITAL ENCOUNTER (OUTPATIENT)
Dept: SPEECH THERAPY | Facility: CLINIC | Age: 29
Setting detail: THERAPIES SERIES
End: 2019-12-23
Attending: PHYSICAL MEDICINE & REHABILITATION
Payer: COMMERCIAL

## 2019-12-23 ENCOUNTER — HOSPITAL ENCOUNTER (OUTPATIENT)
Dept: PHYSICAL THERAPY | Facility: CLINIC | Age: 29
Setting detail: THERAPIES SERIES
End: 2019-12-23
Attending: PHYSICAL MEDICINE & REHABILITATION
Payer: COMMERCIAL

## 2019-12-23 PROCEDURE — 97112 NEUROMUSCULAR REEDUCATION: CPT | Mod: GP | Performed by: PHYSICAL THERAPIST

## 2019-12-23 PROCEDURE — 97127 ZZHC SP THERAPEUTIC INTERVENTION W/FOCUS ON COGNITIVE FUNCTION,EA 15 MIN: CPT | Mod: GN | Performed by: SPEECH-LANGUAGE PATHOLOGIST

## 2019-12-23 PROCEDURE — 97140 MANUAL THERAPY 1/> REGIONS: CPT | Mod: GP | Performed by: PHYSICAL THERAPIST

## 2019-12-27 ENCOUNTER — HOSPITAL ENCOUNTER (OUTPATIENT)
Dept: PHYSICAL THERAPY | Facility: CLINIC | Age: 29
Setting detail: THERAPIES SERIES
End: 2019-12-27
Attending: PHYSICAL MEDICINE & REHABILITATION
Payer: COMMERCIAL

## 2019-12-27 PROCEDURE — 97140 MANUAL THERAPY 1/> REGIONS: CPT | Mod: GP | Performed by: PHYSICAL THERAPIST

## 2019-12-27 PROCEDURE — 97112 NEUROMUSCULAR REEDUCATION: CPT | Mod: GP | Performed by: PHYSICAL THERAPIST

## 2019-12-30 ENCOUNTER — OFFICE VISIT (OUTPATIENT)
Dept: URGENT CARE | Facility: RETAIL CLINIC | Age: 29
End: 2019-12-30
Payer: COMMERCIAL

## 2019-12-30 VITALS
DIASTOLIC BLOOD PRESSURE: 97 MMHG | HEART RATE: 84 BPM | OXYGEN SATURATION: 99 % | RESPIRATION RATE: 16 BRPM | SYSTOLIC BLOOD PRESSURE: 139 MMHG | TEMPERATURE: 98.2 F

## 2019-12-30 DIAGNOSIS — J02.9 ACUTE PHARYNGITIS, UNSPECIFIED ETIOLOGY: Primary | ICD-10-CM

## 2019-12-30 LAB — S PYO AG THROAT QL IA.RAPID: NORMAL

## 2019-12-30 PROCEDURE — 99213 OFFICE O/P EST LOW 20 MIN: CPT | Performed by: NURSE PRACTITIONER

## 2019-12-30 PROCEDURE — 87081 CULTURE SCREEN ONLY: CPT | Performed by: NURSE PRACTITIONER

## 2019-12-30 PROCEDURE — 87880 STREP A ASSAY W/OPTIC: CPT | Mod: QW | Performed by: NURSE PRACTITIONER

## 2019-12-30 ASSESSMENT — ENCOUNTER SYMPTOMS
VOMITING: 0
NAUSEA: 0
RHINORRHEA: 0
FEVER: 0
WHEEZING: 0
CHILLS: 0
SINUS PAIN: 0
COUGH: 0
SORE THROAT: 1
DIARRHEA: 0
SHORTNESS OF BREATH: 0

## 2019-12-31 NOTE — PROGRESS NOTES
SUBJECTIVE:   Marie Yeboah is a 29 year old female presenting with a chief complaint of   Chief Complaint   Patient presents with     Pharyngitis     X 1 day    She is an established patient of [facility].    URI Adult  Onset of symptoms was 2 day(s) ago.  Course of illness is worsening.    Severity moderately severe  Current and Associated symptoms: sore throat  Treatment measures tried include Tylenol/Ibuprofen.  Predisposing factors include ill contact: Family member  and Work.    Review of Systems   Constitutional: Negative for chills and fever.   HENT: Positive for sore throat. Negative for postnasal drip, rhinorrhea and sinus pain.    Respiratory: Negative for cough, shortness of breath and wheezing.    Cardiovascular: Negative for chest pain.   Gastrointestinal: Negative for diarrhea, nausea and vomiting.   Skin: Negative for rash.       Past Medical History:   Diagnosis Date     Anxiety      Depressive disorder 2007     HTN (hypertension)      Family History   Problem Relation Age of Onset     Hypertension Mother      Seizure Disorder Mother      Unknown/Adopted Mother      Hypertension Father      Heart Disease Father      Hyperlipidemia Father      Anxiety Disorder Father      Brain Tumor Paternal Grandfather      Prostate Cancer Paternal Grandfather      Current Outpatient Medications   Medication Sig Dispense Refill     escitalopram (LEXAPRO) 10 MG tablet Take 1.5 tablets (15 mg) by mouth daily 135 tablet 1     hydrochlorothiazide (HYDRODIURIL) 25 MG tablet Take 1 tablet (25 mg) by mouth daily 90 tablet 1     levonorgestrel (MIRENA) 20 MCG/24HR IUD 1 each by Intrauterine route once       azithromycin (ZITHROMAX) 250 MG tablet Two tablets first day, then one tablet daily for four days. (Patient not taking: Reported on 12/30/2019) 6 tablet 0     guaiFENesin-dextromethorphan (ROBITUSSIN DM) 100-10 MG/5ML syrup Take 5 mLs by mouth every 4 hours as needed for cough       Social History     Tobacco Use      Smoking status: Former Smoker     Years: 5.00     Types: Cigarettes     Start date: 1/1/2007     Smokeless tobacco: Never Used     Tobacco comment: Very little use now, maybe once a month   Substance Use Topics     Alcohol use: Yes     Frequency: 2-4 times a month       OBJECTIVE  BP (!) 139/97 (BP Location: Right arm, Patient Position: Sitting, Cuff Size: Adult Large)   Pulse 84   Temp 98.2  F (36.8  C) (Oral)   Resp 16   SpO2 99%     Physical Exam  Vitals signs and nursing note reviewed.   Constitutional:       Appearance: Normal appearance.   HENT:      Head: Normocephalic and atraumatic.      Right Ear: Tympanic membrane, ear canal and external ear normal.      Left Ear: Tympanic membrane, ear canal and external ear normal.      Nose: Congestion and rhinorrhea present.   Eyes:      Conjunctiva/sclera: Conjunctivae normal.   Neck:      Musculoskeletal: Normal range of motion and neck supple.   Cardiovascular:      Rate and Rhythm: Normal rate and regular rhythm.      Pulses: Normal pulses.      Heart sounds: Normal heart sounds.   Neurological:      Mental Status: She is alert.         Labs:  Results for orders placed or performed in visit on 12/30/19 (from the past 24 hour(s))   RAPID STREP SCREEN   Result Value Ref Range    Rapid Strep A Screen Neg neg     ASSESSMENT:      ICD-10-CM    1. 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 In any case, your throat becomes red and sore. Your goal for self-care is to reduce your discomfort while giving your throat a chance to heal.  Moisten and soothe your throat  Tips include the following:    Try a sip of water  first thing after waking up.    Keep your throat moist by drinking 6 or more glasses of clear liquids every day.    Run a cool-air humidifier in your room overnight.    Avoid cigarette smoke.     Suck on throat lozenges, cough drops, hard candy, ice chips, or frozen fruit-juice bars. Use the sugar-free versions if your diet or medical condition requires them.  Gargle to ease irritation  Gargling every hour or 2 can ease irritation. Try gargling with 1 of these solutions:    1/4 teaspoon of salt in 1/2 cup of warm water    An over-the-counter anesthetic gargle  Use medicine for more relief  Over-the-counter medicine can reduce sore throat symptoms. Ask your pharmacist if you have questions about which medicine to use:    Ease pain with anesthetic sprays. Aspirin or an aspirin substitute also helps. Remember, never give aspirin to anyone 18 or younger, or if you are already taking blood thinners.     For sore throats caused by allergies, try antihistamines to block the allergic reaction.    Remember: unless a sore throat is caused by a bacterial infection, antibiotics won t help you.  Prevent future sore throats  Prevention tips include the following:    Stop smoking or reduce contact with secondhand smoke. Smoke irritates the tender throat lining.    Limit contact with pets and with allergy-causing substances, such as pollen and mold.    When you re around someone with a sore throat or cold, wash your hands often to keep viruses or bacteria from spreading.    Don t strain your vocal cords.  Call your healthcare provider  Contact your healthcare provider if you have:    A temperature over 101 F (38.3 C)    White spots on the throat    Great difficulty swallowing    Trouble breathing    A skin rash    Recent exposure to someone else with strep bacteria    Severe hoarseness and swollen glands in the neck or jaw   Date Last Reviewed: 8/1/2016 2000-2018 [employer]. [address] [facility], [facility] [place]  [address]. All rights reserved. This information is not intended as a substitute for professional medical care. Always follow your healthcare professional's instructions.    Follow up if not improved 24 to 48 hours

## 2019-12-31 NOTE — PATIENT INSTRUCTIONS
Patient Education     Self-Care for Sore Throats    Sore throats happen for many reasons, such as colds, allergies, and infections caused by viruses or bacteria. In any case, your throat becomes red and sore. Your goal for self-care is to reduce your discomfort while giving your throat a chance to heal.  Moisten and soothe your throat  Tips include the following:    Try a sip of water first thing after waking up.    Keep your throat moist by drinking 6 or more glasses of clear liquids every day.    Run a cool-air humidifier in your room overnight.    Avoid cigarette smoke.     Suck on throat lozenges, cough drops, hard candy, ice chips, or frozen fruit-juice bars. Use the sugar-free versions if your diet or medical condition requires them.  Gargle to ease irritation  Gargling every hour or 2 can ease irritation. Try gargling with 1 of these solutions:    1/4 teaspoon of salt in 1/2 cup of warm water    An over-the-counter anesthetic gargle  Use medicine for more relief  Over-the-counter medicine can reduce sore throat symptoms. Ask your pharmacist if you have questions about which medicine to use:    Ease pain with anesthetic sprays. Aspirin or an aspirin substitute also helps. Remember, never give aspirin to anyone 18 or younger, or if you are already taking blood thinners.     For sore throats caused by allergies, try antihistamines to block the allergic reaction.    Remember: unless a sore throat is caused by a bacterial infection, antibiotics won t help you.  Prevent future sore throats  Prevention tips include the following:    Stop smoking or reduce contact with secondhand smoke. Smoke irritates the tender throat lining.    Limit contact with pets and with allergy-causing substances, such as pollen and mold.    When you re around someone with a sore throat or cold, wash your hands often to keep viruses or bacteria from spreading.    Don t strain your vocal cords.  Call your healthcare provider  Contact your  healthcare provider if you have:    A temperature over 101 F (38.3 C)    White spots on the throat    Great difficulty swallowing    Trouble breathing    A skin rash    Recent exposure to someone else with strep bacteria    Severe hoarseness and swollen glands in the neck or jaw   Date Last Reviewed: 8/1/2016 2000-2018 [employer]. [address] [place] [address]. All rights reserved. This information is not intended as a substitute for professional medical care. Always follow your healthcare professional's instructions.    Follow up if not improved 24 to 48 hours

## 2020-01-01 LAB
BACTERIA SPEC CULT: NORMAL
SPECIMEN SOURCE: NORMAL

## 2020-01-07 ENCOUNTER — OFFICE VISIT (OUTPATIENT)
Dept: ORTHOPEDICS | Facility: CLINIC | Age: 30
End: 2020-01-07
Payer: COMMERCIAL

## 2020-01-07 VITALS
SYSTOLIC BLOOD PRESSURE: 124 MMHG | BODY MASS INDEX: 38.92 KG/M2 | WEIGHT: 248 LBS | HEIGHT: 67 IN | DIASTOLIC BLOOD PRESSURE: 84 MMHG

## 2020-01-07 DIAGNOSIS — S06.0X1S CONCUSSION WITH LOSS OF CONSCIOUSNESS OF 30 MINUTES OR LESS, SEQUELA (H): Primary | ICD-10-CM

## 2020-01-07 PROCEDURE — 99214 OFFICE O/P EST MOD 30 MIN: CPT | Performed by: PHYSICAL MEDICINE & REHABILITATION

## 2020-01-07 ASSESSMENT — MIFFLIN-ST. JEOR: SCORE: 1883.29

## 2020-01-07 NOTE — LETTER
"    1/7/2020         RE: Marie Yeboah  [address] [facility] [address]        Dear Colleague,    Thank you for referring your patient, Marie Yeboah, to the [facility]. Please see a copy of my visit note below.    Sports Medicine Clinic Report:    CC: Head Injury     Marie Yeboah is a 29 year old female who presents in follow up for a concussion that occurred on 7/28/2019.  Since last visit on 11/19/2019 patient notes improvement. She continues to note difficulty with memory or processing. She notes it is taking her longer to \"spit out what she is going to say.\"  She is continuing physical therapy. Her headaches and dizziness have resolved.     She notes she hasn't had a headache for awhile. Continuing chiropractic care with Leah.     She has been taking melatonin for sleep and fish oil.     Since your last visit, level of activity is:  \"things are getting easy\"    Since your last visit, have you continued with your normal cognitive activity (text, computer, school):  Difficulty with memory, processing, and word finding. She has been discharged from SLP and OT.       Current Symptoms:  CONCUSSION SYMPTOMS ASSESSMENT 12/19/2019 12/23/2019 1/7/2020   Headache or Pressure In Head 1 - mild 0 - none 0 - none   Upset Stomach or Throwing Up 0 - none 0 - none 2 - mild to moderate   Problems with Balance 1 - mild 1 - mild 1 - mild   Feeling Dizzy 1 - mild 0 - none 0 - none   Sensitivity to Light 1 - mild 0 - none 0 - none   Sensitivity to Noise 0 - none 0 - none 0 - none   Mood Changes 2 - mild to moderate 1 - mild 1 - mild   Feeling sluggish, hazy, or foggy 2 - mild to moderate 1 - mild 1 - mild   Trouble Concentrating, Lack of Focus 2 - mild to moderate 2 - mild to moderate 2 - mild to moderate   Motion Sickness 1 - mild 0 - none 0 - none   Vision Changes 1 - mild 0 - none 0 - none   Memory Problems 3 - moderate 3 - moderate 2 - mild to moderate   Feeling Confused 3 - moderate 1 - mild 1 - mild   Neck " "Pain 2 - mild to moderate 1 - mild 2 - mild to moderate   Trouble Sleeping 3 - moderate 4 - moderate to severe 1 - mild   Total Number of Symptoms 13 8 9   Symptom Severity Score 23 14 13       Sleep: getting better    Patient's past medical, surgical, social and family histories are reviewed today.    Past Medical History:   Diagnosis Date     Anxiety      Depressive disorder 2007     HTN (hypertension)      Past Surgical History:   Procedure Laterality Date     C/SECTION, LOW TRANSVERSE      2015, 2016     HC REMOVAL OF TONSILS,<12 Y/O         OBJECTIVE:  /84   Ht 1.695 m (5' 6.73\")   Wt 112.5 kg (248 lb)   BMI 39.15 kg/m       General: Healthy, well-appearing, and in no acute distress.  Skin: no suspicious lesions or rashes  Psych: mentation appears normal, and affect is appropriate/bright  Head:  Normocephalic, atraumatic  Eyes:  Glasses  Neuro: Conversing appropriately      Balance Testing:       - Romberg: normal - although some swaying       - Single-leg stance: abnormal - 4 toe touch downs  Modified DEEPIKA:       Firm   Double Leg 0   Single Leg (Non-Dominant) 4   Tandem (Non-Dominant in back) 1                   Total: 5             Cognitive:  Immediate object recall: 4/4  4 Object Recall at 5 minutes:2/4  Reverse months of the year: 12/12  Spell world backwards: Unable        Impact Testing Scores: ImPACT Testing not performed    ASSESSMENT:  Concussion with loss of consciousness of 30 minutes or less, sequela (H)    PLAN:  -Marie is overall doing much better.  Headaches and dizziness have resolved.  She is still having some issues with memory and word finding but this has also improved and I imagine will continue to improve over time.  -Continue physical therapy (has 4 more visits scheduled).  -Continue exercises and tools recommend by occupational and speech therapy.  -Can return to baseline activities.    -Continue chiropractic care as needed.      -Follow up as needed if symptoms fail to " improve or worsen.  Please call with questions or concerns.    Deanne Garcia MD, [facility] Sports Medicine  [facility]          Again, thank you for allowing me to participate in the care of your patient.        Sincerely,        Pita Garcia MD

## 2020-01-07 NOTE — PROGRESS NOTES
"Sports Medicine Clinic Report:    CC: Head Injury     Marie Yeboah is a 29 year old female who presents in follow up for a concussion that occurred on 7/28/2019.  Since last visit on 11/19/2019 patient notes improvement. She continues to note difficulty with memory or processing. She notes it is taking her longer to \"spit out what she is going to say.\"  She is continuing physical therapy. Her headaches and dizziness have resolved.     She notes she hasn't had a headache for awhile. Continuing chiropractic care with Leah.     She has been taking melatonin for sleep and fish oil.     Since your last visit, level of activity is:  \"things are getting easy\"    Since your last visit, have you continued with your normal cognitive activity (text, computer, school):  Difficulty with memory, processing, and word finding. She has been discharged from SLP and OT.       Current Symptoms:  CONCUSSION SYMPTOMS ASSESSMENT 12/19/2019 12/23/2019 1/7/2020   Headache or Pressure In Head 1 - mild 0 - none 0 - none   Upset Stomach or Throwing Up 0 - none 0 - none 2 - mild to moderate   Problems with Balance 1 - mild 1 - mild 1 - mild   Feeling Dizzy 1 - mild 0 - none 0 - none   Sensitivity to Light 1 - mild 0 - none 0 - none   Sensitivity to Noise 0 - none 0 - none 0 - none   Mood Changes 2 - mild to moderate 1 - mild 1 - mild   Feeling sluggish, hazy, or foggy 2 - mild to moderate 1 - mild 1 - mild   Trouble Concentrating, Lack of Focus 2 - mild to moderate 2 - mild to moderate 2 - mild to moderate   Motion Sickness 1 - mild 0 - none 0 - none   Vision Changes 1 - mild 0 - none 0 - none   Memory Problems 3 - moderate 3 - moderate 2 - mild to moderate   Feeling Confused 3 - moderate 1 - mild 1 - mild   Neck Pain 2 - mild to moderate 1 - mild 2 - mild to moderate   Trouble Sleeping 3 - moderate 4 - moderate to severe 1 - mild   Total Number of Symptoms 13 8 9   Symptom Severity Score 23 14 13       Sleep: getting better    Patient's past " "medical, surgical, social and family histories are reviewed today.    Past Medical History:   Diagnosis Date     Anxiety      Depressive disorder 2007     HTN (hypertension)      Past Surgical History:   Procedure Laterality Date     C/SECTION, LOW TRANSVERSE      2015, 2016     HC REMOVAL OF TONSILS,<12 Y/O         OBJECTIVE:  /84   Ht 1.695 m (5' 6.73\")   Wt 112.5 kg (248 lb)   BMI 39.15 kg/m      General: Healthy, well-appearing, and in no acute distress.  Skin: no suspicious lesions or rashes  Psych: mentation appears normal, and affect is appropriate/bright  Head:  Normocephalic, atraumatic  Eyes:  Glasses  Neuro: Conversing appropriately      Balance Testing:       - Romberg: normal - although some swaying       - Single-leg stance: abnormal - 4 toe touch downs  Modified DEEPIKA:       Firm   Double Leg 0   Single Leg (Non-Dominant) 4   Tandem (Non-Dominant in back) 1                   Total: 5             Cognitive:  Immediate object recall: 4/4  4 Object Recall at 5 minutes:2/4  Reverse months of the year: 12/12  Spell world backwards: Unable        Impact Testing Scores: ImPACT Testing not performed    ASSESSMENT:  Concussion with loss of consciousness of 30 minutes or less, sequela (H)    PLAN:  -Marie is overall doing much better.  Headaches and dizziness have resolved.  She is still having some issues with memory and word finding but this has also improved and I imagine will continue to improve over time.  -Continue physical therapy (has 4 more visits scheduled).  -Continue exercises and tools recommend by occupational and speech therapy.  -Can return to baseline activities.    -Continue chiropractic care as needed.      -Follow up as needed if symptoms fail to improve or worsen.  Please call with questions or concerns.    Deanne Garcia MD, CAQ Sports Medicine  [facility]      "

## 2020-01-07 NOTE — PATIENT INSTRUCTIONS
-Continue physical therapy.    -Continue exercises and tools recommend by occupational and speech therapy.  -Can return to baseline activities.    -Continue chiropractic care as needed.      -Follow up as needed if symptoms fail to improve or worsen.  Please call with questions or concerns.

## 2020-01-08 ENCOUNTER — HOSPITAL ENCOUNTER (OUTPATIENT)
Dept: PHYSICAL THERAPY | Facility: CLINIC | Age: 30
Setting detail: THERAPIES SERIES
End: 2020-01-08
Attending: PHYSICAL MEDICINE & REHABILITATION
Payer: COMMERCIAL

## 2020-01-08 PROCEDURE — 97112 NEUROMUSCULAR REEDUCATION: CPT | Mod: GP | Performed by: PHYSICAL THERAPIST

## 2020-01-14 ENCOUNTER — ANCILLARY PROCEDURE (OUTPATIENT)
Dept: GENERAL RADIOLOGY | Facility: CLINIC | Age: 30
End: 2020-01-14
Attending: INTERNAL MEDICINE
Payer: COMMERCIAL

## 2020-01-14 ENCOUNTER — OFFICE VISIT (OUTPATIENT)
Dept: GASTROENTEROLOGY | Facility: CLINIC | Age: 30
End: 2020-01-14
Payer: COMMERCIAL

## 2020-01-14 VITALS
SYSTOLIC BLOOD PRESSURE: 128 MMHG | OXYGEN SATURATION: 98 % | WEIGHT: 252 LBS | HEART RATE: 86 BPM | HEIGHT: 67 IN | DIASTOLIC BLOOD PRESSURE: 70 MMHG | BODY MASS INDEX: 39.55 KG/M2 | TEMPERATURE: 97.8 F

## 2020-01-14 DIAGNOSIS — R10.32 ABDOMINAL PAIN, LEFT LOWER QUADRANT: Primary | ICD-10-CM

## 2020-01-14 DIAGNOSIS — R10.32 ABDOMINAL PAIN, LEFT LOWER QUADRANT: ICD-10-CM

## 2020-01-14 PROCEDURE — 99203 OFFICE O/P NEW LOW 30 MIN: CPT | Performed by: INTERNAL MEDICINE

## 2020-01-14 PROCEDURE — 74019 RADEX ABDOMEN 2 VIEWS: CPT | Mod: TC

## 2020-01-14 ASSESSMENT — MIFFLIN-ST. JEOR: SCORE: 1901.4

## 2020-01-14 NOTE — PATIENT INSTRUCTIONS
An abdominal x-ray will be done today to assess how full the colon is.  If this does not show any blockage, we will consider use of Linzess or Amitiza for constipation-dominant IBS.

## 2020-01-15 ENCOUNTER — THERAPY VISIT (OUTPATIENT)
Dept: CHIROPRACTIC MEDICINE | Facility: CLINIC | Age: 30
End: 2020-01-15
Payer: COMMERCIAL

## 2020-01-15 DIAGNOSIS — M62.838 MUSCLE SPASM: ICD-10-CM

## 2020-01-15 DIAGNOSIS — M99.04 SEGMENTAL DYSFUNCTION OF SACRAL REGION: ICD-10-CM

## 2020-01-15 DIAGNOSIS — S13.4XXA SPRAIN OF LIGAMENTS OF CERVICAL SPINE: ICD-10-CM

## 2020-01-15 DIAGNOSIS — M99.02 SEGMENTAL DYSFUNCTION OF THORACIC REGION: ICD-10-CM

## 2020-01-15 DIAGNOSIS — M99.03 SEGMENTAL DYSFUNCTION OF LUMBAR REGION: ICD-10-CM

## 2020-01-15 DIAGNOSIS — M99.01 SEGMENTAL DYSFUNCTION OF CERVICAL REGION: Primary | ICD-10-CM

## 2020-01-15 PROCEDURE — 98941 CHIROPRACT MANJ 3-4 REGIONS: CPT | Mod: AT | Performed by: CHIROPRACTOR

## 2020-01-15 NOTE — PROGRESS NOTES
Visit #:  10 of 12 based on treatment plan 8/2/2019    Subjective:  Marie Yeboah is a 29 year old female who is seen in f/u up for:        Segmental dysfunction of cervical region  Segmental dysfunction of thoracic region  Segmental dysfunction of lumbar region  Segmental dysfunction of sacral region  Sprain of ligaments of cervical spine  Muscle spasm.     Since last visit on 12/20/2019,  Marie Yeboah reports the following changes: Patient presents and states that she is sick and has been off an on for awhile. Her neck is doing pretty good! She has been getting massages and had her last one today. She feels like this has helped big time. She rates her current pain 2/10.       Objective:  The following was observed:    P: palpatory tenderness L upper traps    A: static palpation demonstrates intersegmental asymmetry     R: motion palpation notes restricted motion    T: localized muscle spasm at: Sub-occipital and Traps L>>R      Assessment:    Segmental spinal dysfunction/restrictions found at:  C2 LR, RRR  T1 LR, RRR  T5 E, FR  T10 E, FR  Right SI posterior      Diagnoses:      1. Segmental dysfunction of cervical region    2. Segmental dysfunction of thoracic region    3. Segmental dysfunction of lumbar region    4. Segmental dysfunction of sacral region    5. Sprain of ligaments of cervical spine    6. Muscle spasm        Patient's condition:  Patient had restrictions pre-manipulation and Patient had decreased motion prior to manipulation    Treatment effectiveness:  Post manipulation there is better intersegmental movement and Patient claims to feel looser post manipulation      Procedures:  CMT:  20823 Chiropractic manipulative treatment 3-4 regions performed   Cervical: Gentle Mobilization, C2, Supine  Thoracic: Diversified, T1, T5, T10,  Prone  Pelvis: Drop assist, Right SI, Prone    Modalities:  MSTM to affected musculature, on Left WITH [employer]el/HYPERVOLT      Therapeutic procedures:  Therapeutic  Exercise: Cervical stretches and Bruegger's Relief  Gave patient Ice instructions post adjustment, and instructions for acute care      Prognosis: Good    Progress towards Goals: Patient is making progress towards the goal of:  Decrease pain in neck and upper back from 7/10 to 3/10 in 4 treatments. GOAL MET  Reduce functional impairment in Neck Disability from 58% to 25% in 4 treatments.  Reduce intensity and frequency of headache. GOAL MET  Return to work. GOAL MET  Decrease hypertonicity in left upper traps and cervical musculature.        Response to Treatment:   Patient tolerated treatment well today and is improving with care. Recently diagnosed with concussion, will initiate PT, OT and speech therapy.       Recommendations:    Instructions:ice 20 minutes every other hour as needed    Follow-up:  Return to care in 2 weeks. Continue in conjunction with massages, PT, OT and speech therapy.

## 2020-01-15 NOTE — PROGRESS NOTES
Visit Date:   2020      REQUESTING PROVIDER:  KENDAL Albarado, CNP regarding abdominal pain.      HISTORY OF PRESENT ILLNESS:  The patient is a 29-year-old woman who states that as a young teen, around the time she started menstruating, she would experience episodes of diarrhea at the onset of her menstrual bleeding.  This would usually last 15-30 minutes during which she would pass copious amounts of loose stool.  This tended to dissipate a bit with age.  In her 20s, after she had 2 pregnancies, both delivered by  section, she experienced a different sort of bowel habit.  She would notice pressure and discomfort building in the suprapubic area of the lower abdomen just prior to a bowel movement.  She would then have the urgent need to have a bowel movement and would pass a large soft or formed stool.  This would typically relieve her discomfort.  Thus, the discomfort would last only a few minutes.  This is now an essentially daily phenomenon.  She rarely has episodes of diarrhea anymore.  For about 5 days, in consecutive order, per month, she will experience what she perceived to be constipation, wherein she will feel the need to have a bowel movement and have 3 or 4 stools through the course of the day, which seems small and inadequate.  She will then take a dose of MiraLAX to try to correct this situation, usually with success.      She denies rectal bleeding, mucus discharge, weight loss, nausea, vomiting, heartburn, dysphagia, fever or chills.  She denies abdominal distention or bloating.  She reports no urinary tract complaints nor any abnormal bleeding or discharge per vagina.  Her energy level is good.      The patient did have an episode of Clostridium difficile infection after one of her  sections.  She did suffer a relapse, but ultimately overcame the infection.      The patient has not had any radiographic or endoscopic examinations to evaluate this discomfort.      PAST MEDICAL  HISTORY:  Two normal pregnancies, each delivered by  section, status post tonsillectomy.      CURRENT MEDICATIONS:     1.  Lexapro.   2.  Hydrochlorothiazide.   3.  Mirena IUD.      MEDICATION ALLERGIES:  CONTRAST DYE.      FAMILY HISTORY:  Father age 51.  Mother age 49.  Both in good health, though both have hypertension and mother has a seizure disorder.      SOCIAL HISTORY:  The patient is  and has 2 children.  She is a former smoker, does not drink to excess.  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      PHYSICAL EXAMINATION:    GENERAL:  On exam, she is a pleasant young woman in no distress.     VITAL SIGNS:  Blood pressure 128/70, pulse 86, weight 252 pounds, BMI 39.7.   HEENT:  Oropharynx is hydrated.  No aphthous ulcers or candidiasis.  No scleral icterus.  No cervical or supraclavicular adenopathy.   LUNGS:  Clear.   HEART:  Regular without murmur, rub, gallop or click.   ABDOMEN:  Bowel sounds present.  No bruits or masses.  No distention or tenderness.  Palpation of the suprapubic area does not provoke any discomfort.      ASSESSMENT:     1.  Intense suprapubic abdominal discomfort just prior to the passage of a stool and resolved with passage of that stool.  Diagnostic considerations would include irritable bowel syndrome or even pelvic adhesions related to previous  section, causing a degree of colonic stenosis at the rectosigmoid junction or sigmoid colon.    2.  Probable baseline constipation.      PLAN:   1.  Discussed diagnostic considerations with her.   2.  Plain abdominal x-ray will be obtained today to assess fecal burden.   3.  Will consider empiric treatment with Linzess or Amitiza for presumed irritable bowel syndrome.   4.  Colonoscopy could be considered depending on how she responds to the Linzess or Amitiza.   5.  Further recommendations will follow.         HUBERT MYERS MD             D: 2020   T: 2020   MT: LEONA      Name:      JENNA HERNANDEZ   MRN:      [id number]        Account:      [id number]   :      1990           Visit Date:   2020      Document: [id number]       cc: Jo EDMONDS CNP

## 2020-01-18 ENCOUNTER — OFFICE VISIT (OUTPATIENT)
Dept: URGENT CARE | Facility: RETAIL CLINIC | Age: 30
End: 2020-01-18
Payer: COMMERCIAL

## 2020-01-18 VITALS — OXYGEN SATURATION: 98 % | HEART RATE: 80 BPM | TEMPERATURE: 98.3 F

## 2020-01-18 DIAGNOSIS — J01.90 ACUTE SINUSITIS WITH SYMPTOMS > 10 DAYS: Primary | ICD-10-CM

## 2020-01-18 DIAGNOSIS — J20.9 ACUTE BRONCHITIS WITH SYMPTOMS > 10 DAYS: ICD-10-CM

## 2020-01-18 PROCEDURE — 99213 OFFICE O/P EST LOW 20 MIN: CPT | Performed by: INTERNAL MEDICINE

## 2020-01-18 RX ORDER — ALBUTEROL SULFATE 90 UG/1
2 AEROSOL, METERED RESPIRATORY (INHALATION) EVERY 6 HOURS
COMMUNITY
End: 2020-09-23

## 2020-01-18 RX ORDER — AZITHROMYCIN 250 MG/1
TABLET, FILM COATED ORAL
Qty: 6 TABLET | Refills: 0 | Status: SHIPPED | OUTPATIENT
Start: 2020-01-18 | End: 2020-04-23

## 2020-01-18 NOTE — PROGRESS NOTES
[facility]         Elizabeth Hart MD, MPH  01/18/2020        History:      Marie Yeboah is a pleasant 29 year old year old female with a chief complaint of  Nasal congestion,a non-productive cough,facial and sinus pressure and pain since 2 weeks ago.   No fever or chills.   No dyspnea or chest pain.   No smoking history.   No headache or neck pain.  No GI or  symptoms.   No MSK symptoms.         Assessment and Plan:        Acute sinusitis with symptoms > 10 days    - azithromycin (ZITHROMAX) 250 MG tablet; Two tablets first day, then one tablet daily for four days.  Dispense: 6 tablet; Refill: 0    Discussed supportive care with the patient  Advised to increase fluid intake and rest.  Tylenol 650 mg PO for pain q 6 hours prn  F/u w PCP in 4-5 days, earlier if symptoms worsen.                   Physical Exam:      Pulse 80   Temp 98.3  F (36.8  C) (Oral)   SpO2 98%   Breastfeeding No      Constitutional: Patient is in no distress The patient is pleasant and cooperative.   HEENT: Head:  Head is atraumatic, normocephalic.    Eyes: Pupils are equal, round and reactive to light and accomodation.  Sclera is non-icteric. No conjunctival injection, or exudate noted. Extraocular motion is intact. Visual acuity is intact bilaterally.  Ears:  External acoustic canals are patent and clear.  There is no erythema and bulging( exudate)  of the ( R/L ) tympanic membrane(s ).   Nose:  Nasal congestion w/o drainage or mucosal ulceration is noted. The patient experiences pain upon gentle percussion of malar and maxillary prominences . No pain upon palpation of mastoid processes.  Throat:  Oral mucosa is moist.  No oral lesions are noted.  No posterior pharyngeal hyperemia nor exudate noted.     Neck Supple.  There is no cervical lymphadenopathy.  No nuchal rigidity noted.  There is no meningismus.     Cardiovascular: Heart is regular to rate and rhythm.  No murmur is noted.     Lungs: Clear in the  anterior and posterior pulmonary fields.   Abdomen: Soft and non-tender.    Back No flank tenderness is noted.   Extremeties No edema, no calf tenderness.   Neuro: No focal deficit.   Skin No petechiae or purpura is noted.  There is no rash.   Mood Normal              Data:      All new lab and imaging data was reviewed.   No results found for any visits on 01/18/20.

## 2020-01-20 ENCOUNTER — HOSPITAL ENCOUNTER (OUTPATIENT)
Dept: PHYSICAL THERAPY | Facility: CLINIC | Age: 30
Setting detail: THERAPIES SERIES
End: 2020-01-20
Attending: PHYSICAL MEDICINE & REHABILITATION
Payer: COMMERCIAL

## 2020-01-20 PROCEDURE — 97112 NEUROMUSCULAR REEDUCATION: CPT | Mod: GP | Performed by: PHYSICAL THERAPIST

## 2020-01-27 ENCOUNTER — HOSPITAL ENCOUNTER (OUTPATIENT)
Dept: PHYSICAL THERAPY | Facility: CLINIC | Age: 30
Setting detail: THERAPIES SERIES
End: 2020-01-27
Attending: PHYSICAL MEDICINE & REHABILITATION
Payer: COMMERCIAL

## 2020-01-27 PROCEDURE — 97112 NEUROMUSCULAR REEDUCATION: CPT | Mod: GP | Performed by: PHYSICAL THERAPIST

## 2020-01-28 DIAGNOSIS — R10.32 ABDOMINAL PAIN, LEFT LOWER QUADRANT: Primary | ICD-10-CM

## 2020-01-28 DIAGNOSIS — K59.00 CONSTIPATION, UNSPECIFIED CONSTIPATION TYPE: ICD-10-CM

## 2020-01-28 NOTE — TELEPHONE ENCOUNTER
LVM for patient to give x-ray results and if she wants to start on new medication as directed by Dr. Lofton.    Nilda Duncan CMA

## 2020-01-28 NOTE — TELEPHONE ENCOUNTER
----- Message from Rashad Lofton MD sent at 1/15/2020  8:28 AM CST -----  Notify patient that her x-ray shows a moderate amount of retained stool despite a bowel movement earlier in the day.  I suggest Linzess 145 mcg once daily for constipation-dominant IBS, trial for one month.  If she continues to have trouble, we can go ahead with a colonoscopy as discussed in clinic.

## 2020-01-30 NOTE — TELEPHONE ENCOUNTER
Patient voiced in understanding with results. Patient is willing to try the Linzess 145 mcg once daily . Will route to Dr. Lofton to send script to pharmacy.    Pharmacy patient would like to use is [facility].    Nilda Duncan [facility]

## 2020-01-31 ENCOUNTER — THERAPY VISIT (OUTPATIENT)
Dept: CHIROPRACTIC MEDICINE | Facility: CLINIC | Age: 30
End: 2020-01-31
Payer: COMMERCIAL

## 2020-01-31 ENCOUNTER — TELEPHONE (OUTPATIENT)
Dept: GASTROENTEROLOGY | Facility: CLINIC | Age: 30
End: 2020-01-31

## 2020-01-31 DIAGNOSIS — M62.838 MUSCLE SPASM: ICD-10-CM

## 2020-01-31 DIAGNOSIS — M99.04 SEGMENTAL DYSFUNCTION OF SACRAL REGION: ICD-10-CM

## 2020-01-31 DIAGNOSIS — M99.01 SEGMENTAL DYSFUNCTION OF CERVICAL REGION: Primary | ICD-10-CM

## 2020-01-31 DIAGNOSIS — M99.02 SEGMENTAL DYSFUNCTION OF THORACIC REGION: ICD-10-CM

## 2020-01-31 DIAGNOSIS — S13.4XXA SPRAIN OF LIGAMENTS OF CERVICAL SPINE: ICD-10-CM

## 2020-01-31 DIAGNOSIS — M99.03 SEGMENTAL DYSFUNCTION OF LUMBAR REGION: ICD-10-CM

## 2020-01-31 PROCEDURE — 98941 CHIROPRACT MANJ 3-4 REGIONS: CPT | Mod: AT | Performed by: CHIROPRACTOR

## 2020-01-31 NOTE — PROGRESS NOTES
Visit #:  11 of 12 based on treatment plan 8/2/2019    Subjective:  Marie Yeboah is a 29 year old female who is seen in f/u up for:        Segmental dysfunction of cervical region  Segmental dysfunction of thoracic region  Segmental dysfunction of lumbar region  Segmental dysfunction of sacral region  Sprain of ligaments of cervical spine  Muscle spasm.     Since last visit on 1/15/2020,  Marie Yeboah reports the following changes: Patient presents and states that she has been having a few more headaches. They have been digging deeper into her muscles so she attributes her pain to this.     Objective:  The following was observed:    P: palpatory tenderness L upper traps    A: static palpation demonstrates intersegmental asymmetry     R: motion palpation notes restricted motion    T: localized muscle spasm at: Sub-occipital and Traps L>>R      Assessment:    Segmental spinal dysfunction/restrictions found at:  C2 LR, RRR  C5 RR, LRR  T1 LR, RRR  T5 E, FR  T10 E, FR  Left SI posterior      Diagnoses:      1. Segmental dysfunction of cervical region    2. Segmental dysfunction of thoracic region    3. Segmental dysfunction of lumbar region    4. Segmental dysfunction of sacral region    5. Sprain of ligaments of cervical spine    6. Muscle spasm        Patient's condition:  Patient had restrictions pre-manipulation and Patient had decreased motion prior to manipulation    Treatment effectiveness:  Post manipulation there is better intersegmental movement and Patient claims to feel looser post manipulation      Procedures:  CMT:  05321 Chiropractic manipulative treatment 3-4 regions performed   Cervical: Gentle Mobilization, C2, C5, Supine  Thoracic: Diversified, T1, T5, T10,  Prone  Pelvis: Drop assist, Left SI, Prone    Modalities:  MSTM to affected musculature, on Left WITH [employer]el/HYPERVOLT      Therapeutic procedures:  Therapeutic Exercise: Cervical stretches and Bruegger's Relief  Gave patient Ice  instructions post adjustment, and instructions for acute care      Prognosis: Good    Progress towards Goals: Patient is making progress towards the goal of:  Decrease pain in neck and upper back from 7/10 to 3/10 in 4 treatments. GOAL MET  Reduce functional impairment in Neck Disability from 58% to 25% in 4 treatments.  Reduce intensity and frequency of headache. GOAL MET  Return to work. GOAL MET  Decrease hypertonicity in left upper traps and cervical musculature.        Response to Treatment:   Patient tolerated treatment well today and is improving with care. Recently diagnosed with concussion, will initiate PT, OT and speech therapy.       Recommendations:    Instructions:ice 20 minutes every other hour as needed    Follow-up:  Return to care in 2 weeks. Continue in conjunction with massages, PT, OT and speech therapy.

## 2020-01-31 NOTE — TELEPHONE ENCOUNTER
Writer called patient to inform her she has medication ready at the [facility] pharmacy. Patient verbalized understanding.   Alisia Rios RN on 1/31/2020 at 9:31 AM

## 2020-02-03 ENCOUNTER — MYC REFILL (OUTPATIENT)
Dept: FAMILY MEDICINE | Facility: CLINIC | Age: 30
End: 2020-02-03

## 2020-02-03 DIAGNOSIS — I10 HYPERTENSION, GOAL BELOW 140/90: ICD-10-CM

## 2020-02-05 RX ORDER — HYDROCHLOROTHIAZIDE 25 MG/1
25 TABLET ORAL DAILY
Qty: 90 TABLET | Refills: 1 | OUTPATIENT
Start: 2020-02-05

## 2020-02-05 NOTE — TELEPHONE ENCOUNTER
Hydrochlorothiazide Rx denied as has refill enough thru March 10th ( [facility])  Needs to schedule stephani as this is a former Jo Araujo pt.  She filled last RX. . BP was also elevated............TRICIA Mack  BP Readings from Last 3 Encounters:   01/14/20 128/70   01/07/20 124/84   12/30/19 (!) 139/97

## 2020-02-05 NOTE — PROGRESS NOTES
Outpatient Speech Language Pathology Discharge Note     Patient: Marie Yeboah  : 1990    Beginning/End Dates of Reporting Period:  2019 to 2020    Referring Provider: Dr. Pita Garcia    Therapy Diagnosis: moderate cognitive linguistic deficit    Client Self Report: Patient seen following PT session.  Patient reports that she still has difficulties with memory at times, but that overall she is doing well and feels home programming continues to help.    Objective Measurements: See goals and progress below.          Goals:  Goal Identifier Mental manipulations   Goal Description Patient will read and utilize organized scanning techniques to locate answers to questions regarding functional information with 80% accuracy and minimal cues.   Target Date 20   Date Met  19   Progress: Goal met.     Goal Identifier Problem solving   Goal Description Patient will complete complex problem solving and reasoning tasks with 90% accuracy and minimal cues.   Target Date 20   Date Met  19   Progress: Goal met.     Goal Identifier Memory   Goal Description Patient will recall memory strategies discussed in therapy session following a short delay via answering questions with minimal visual and verbal cues.   Target Date 20   Date Met  19   Progress: Goal met.       Progress Toward Goals:   Progress this reporting period: Patient met all goals this treatment period and appears appropriate for discharge at this time.       Plan:   Discharge from therapy.    Discharge:    Reason for Discharge: Patient has met all goals.    Equipment Issued: None    Discharge Plan: Patient to continue home program.

## 2020-02-06 NOTE — TELEPHONE ENCOUNTER
Pt is already scheduled with Pita Youngblood on 2/12.  Thank you,  Judy Kimball- Patient Representative

## 2020-02-12 ENCOUNTER — THERAPY VISIT (OUTPATIENT)
Dept: CHIROPRACTIC MEDICINE | Facility: CLINIC | Age: 30
End: 2020-02-12
Payer: COMMERCIAL

## 2020-02-12 DIAGNOSIS — S13.4XXA SPRAIN OF LIGAMENTS OF CERVICAL SPINE: ICD-10-CM

## 2020-02-12 DIAGNOSIS — M99.02 SEGMENTAL DYSFUNCTION OF THORACIC REGION: ICD-10-CM

## 2020-02-12 DIAGNOSIS — M62.838 MUSCLE SPASM: ICD-10-CM

## 2020-02-12 DIAGNOSIS — M99.03 SEGMENTAL DYSFUNCTION OF LUMBAR REGION: ICD-10-CM

## 2020-02-12 DIAGNOSIS — M99.04 SEGMENTAL DYSFUNCTION OF SACRAL REGION: ICD-10-CM

## 2020-02-12 DIAGNOSIS — M99.01 SEGMENTAL DYSFUNCTION OF CERVICAL REGION: Primary | ICD-10-CM

## 2020-02-12 PROCEDURE — 98941 CHIROPRACT MANJ 3-4 REGIONS: CPT | Mod: AT | Performed by: CHIROPRACTOR

## 2020-02-12 NOTE — PROGRESS NOTES
"Visit #:  12 of 12 based on treatment plan 8/2/2019    Subjective:  Marie Yeboah is a 29 year old female who is seen in f/u up for:        Segmental dysfunction of cervical region  Segmental dysfunction of thoracic region  Segmental dysfunction of lumbar region  Segmental dysfunction of sacral region  Sprain of ligaments of cervical spine  Muscle spasm.     Since last visit on 1/31/2020,  Marie Yeboah reports the following changes: Patient presents and states that she is \"going backwards.\" The left side feels like it is getting worse again. She feels really tight again. Her pain is rated 5/10. Nothing else has changed, she is still getting massages weekly. She cannot figure out why she is getting worse, she hopes it is just a \"bad week.\"      Objective:  The following was observed:    P: palpatory tenderness L upper traps    A: static palpation demonstrates intersegmental asymmetry     R: motion palpation notes restricted motion    T: localized muscle spasm at: Sub-occipital and Traps L>>R      Assessment:    Segmental spinal dysfunction/restrictions found at:  C2 LR, RRR  C5 RR, LRR  T1 LR, RRR  T5 E, FR  T10 E, FR  Left SI posterior      Diagnoses:      1. Segmental dysfunction of cervical region    2. Segmental dysfunction of thoracic region    3. Segmental dysfunction of lumbar region    4. Segmental dysfunction of sacral region    5. Sprain of ligaments of cervical spine    6. Muscle spasm        Patient's condition:  Patient had restrictions pre-manipulation and Patient had decreased motion prior to manipulation    Treatment effectiveness:  Post manipulation there is better intersegmental movement and Patient claims to feel looser post manipulation      Procedures:  CMT:  85797 Chiropractic manipulative treatment 3-4 regions performed   Cervical: Gentle Mobilization, C2, C5, Supine  Thoracic: Diversified, T1, T5, T10,  Prone  Pelvis: Drop assist, Left SI, Prone    Modalities:  MSTM to affected musculature, " on Left WITH [employer]/HYPERVOLT      Therapeutic procedures:  Therapeutic Exercise: Cervical stretches and Bruegger's Relief  Gave patient Ice instructions post adjustment, and instructions for acute care      Prognosis: Good    Progress towards Goals: Patient is making progress towards the goal of:  Decrease pain in neck and upper back from 7/10 to 3/10 in 4 treatments. GOAL MET  Reduce functional impairment in Neck Disability from 58% to 25% in 4 treatments.  Reduce intensity and frequency of headache. GOAL MET  Return to work. GOAL MET  Decrease hypertonicity in left upper traps and cervical musculature.        Response to Treatment:   Patient tolerated treatment well today and is improving with care. Recently diagnosed with concussion, will initiate PT, OT and speech therapy.       Recommendations:    Instructions:ice 20 minutes every other hour as needed    Follow-up:  Return to care in 2 weeks. Continue in conjunction with massages, PT, OT and speech therapy.

## 2020-02-14 ENCOUNTER — HOSPITAL ENCOUNTER (OUTPATIENT)
Dept: PHYSICAL THERAPY | Facility: CLINIC | Age: 30
Setting detail: THERAPIES SERIES
End: 2020-02-14
Attending: PHYSICAL MEDICINE & REHABILITATION
Payer: COMMERCIAL

## 2020-02-14 PROCEDURE — 97140 MANUAL THERAPY 1/> REGIONS: CPT | Mod: GP | Performed by: PHYSICAL THERAPIST

## 2020-02-21 ENCOUNTER — HOSPITAL ENCOUNTER (OUTPATIENT)
Dept: PHYSICAL THERAPY | Facility: CLINIC | Age: 30
Setting detail: THERAPIES SERIES
End: 2020-02-21
Attending: PHYSICAL MEDICINE & REHABILITATION
Payer: COMMERCIAL

## 2020-02-21 PROCEDURE — 97112 NEUROMUSCULAR REEDUCATION: CPT | Mod: GP | Performed by: PHYSICAL THERAPIST

## 2020-02-24 ENCOUNTER — THERAPY VISIT (OUTPATIENT)
Dept: CHIROPRACTIC MEDICINE | Facility: CLINIC | Age: 30
End: 2020-02-24
Payer: COMMERCIAL

## 2020-02-24 DIAGNOSIS — M99.02 SEGMENTAL DYSFUNCTION OF THORACIC REGION: ICD-10-CM

## 2020-02-24 DIAGNOSIS — M62.838 MUSCLE SPASM: ICD-10-CM

## 2020-02-24 DIAGNOSIS — M99.01 SEGMENTAL DYSFUNCTION OF CERVICAL REGION: Primary | ICD-10-CM

## 2020-02-24 DIAGNOSIS — S13.4XXA SPRAIN OF LIGAMENTS OF CERVICAL SPINE: ICD-10-CM

## 2020-02-24 DIAGNOSIS — M99.03 SEGMENTAL DYSFUNCTION OF LUMBAR REGION: ICD-10-CM

## 2020-02-24 DIAGNOSIS — M99.04 SEGMENTAL DYSFUNCTION OF SACRAL REGION: ICD-10-CM

## 2020-02-24 PROCEDURE — 99212 OFFICE O/P EST SF 10 MIN: CPT | Mod: 25 | Performed by: CHIROPRACTOR

## 2020-02-24 PROCEDURE — 98941 CHIROPRACT MANJ 3-4 REGIONS: CPT | Mod: AT | Performed by: CHIROPRACTOR
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
## 2020-03-04 ENCOUNTER — THERAPY VISIT (OUTPATIENT)
Dept: CHIROPRACTIC MEDICINE | Facility: CLINIC | Age: 30
End: 2020-03-04
Payer: COMMERCIAL

## 2020-03-04 DIAGNOSIS — S13.4XXA SPRAIN OF LIGAMENTS OF CERVICAL SPINE: ICD-10-CM

## 2020-03-04 DIAGNOSIS — M99.02 SEGMENTAL DYSFUNCTION OF THORACIC REGION: ICD-10-CM

## 2020-03-04 DIAGNOSIS — M99.01 SEGMENTAL DYSFUNCTION OF CERVICAL REGION: Primary | ICD-10-CM

## 2020-03-04 DIAGNOSIS — M62.838 MUSCLE SPASM: ICD-10-CM

## 2020-03-04 DIAGNOSIS — M99.04 SEGMENTAL DYSFUNCTION OF SACRAL REGION: ICD-10-CM

## 2020-03-04 DIAGNOSIS — M99.03 SEGMENTAL DYSFUNCTION OF LUMBAR REGION: ICD-10-CM

## 2020-03-04 PROCEDURE — 97810 ACUP 1/> WO ESTIM 1ST 15 MIN: CPT | Performed by: CHIROPRACTOR

## 2020-03-04 PROCEDURE — 98941 CHIROPRACT MANJ 3-4 REGIONS: CPT | Mod: AT | Performed by: CHIROPRACTOR

## 2020-03-04 NOTE — PROGRESS NOTES
Visit #:  2 of 8 based on treatment plan 8/2/2019    Subjective:  Marie Yeboah is a 29 year old female who is seen in f/u up for:        Segmental dysfunction of cervical region  Segmental dysfunction of thoracic region  Segmental dysfunction of lumbar region  Segmental dysfunction of sacral region  Sprain of ligaments of cervical spine  Muscle spasm.     Since last visit on 2/24/2020,  Marie Yeboah reports the following changes: Patient presents and states that she is doing better but still having some light headaches. Overall, she is improving but still feels the pain and symptoms and the light headaches from time to time. The pain tends to be in the left occipital region, but has been behind her right ear this week. She rates her current pain 2/10.       Objective:  The following was observed:      P: palpatory tenderness L upper traps, behind right ear    A: static palpation demonstrates intersegmental asymmetry     R: motion palpation notes restricted motion    T: localized muscle spasm at: Sub-occipital and Traps R>>L      Assessment:    Segmental spinal dysfunction/restrictions found at:  C1 LR, RRR  C2 RR, LRR  T1 LR, RRR  T7 E, FR  T10 E, FR  Left SI posterior      Diagnoses:      1. Segmental dysfunction of cervical region    2. Segmental dysfunction of thoracic region    3. Segmental dysfunction of lumbar region    4. Segmental dysfunction of sacral region    5. Sprain of ligaments of cervical spine    6. Muscle spasm        Patient's condition:  Patient had restrictions pre-manipulation and Patient had decreased motion prior to manipulation    Treatment effectiveness:  Post manipulation there is better intersegmental movement and Patient claims to feel looser post manipulation      Procedures:  CMT:  53524 Chiropractic manipulative treatment 3-4 regions performed   Cervical: Gentle Mobilization, C1, C2, Supine  Thoracic: Diversified, T1, T7, T10,  Prone  Pelvis: Drop assist, Left SI,  Prone    Modalities:  MSTM to affected musculature, on Left WITH [employer]/HYPERVOLT  ACUPUNCTURE for cervical sprain, left trap hypertonicity and headaches: trigger point in left traps, GV20, GV16, LI4, SI15, GB 39, GB20, GB39      Therapeutic procedures:  Therapeutic Exercise: Cervical stretches and Bruegger's Relief - continue with these stretches throughout the day  Gave patient Ice instructions post adjustment, and instructions for acute care      Prognosis: Good    Progress towards Goals: Patient is making progress towards the goal of:  Decrease pain in neck and upper back from 7/10 to 3/10 in 4 treatments. GOAL MET  Reduce functional impairment in Neck Disability from 58% to 25% in 4 treatments.  Reduce intensity and frequency of headache. GOAL MET  Return to work. GOAL MET  Decrease hypertonicity in left upper traps and cervical musculature. (we have started acupuncture 3/4/2020)       Response to Treatment:   Patient tolerated treatment well today and is improving with care of OT, OT, massage, and we will initiate acupuncture today.      Recommendations:    Instructions:ice 20 minutes every other hour as needed    Follow-up:  Return to care in 1 week to continue adjustments and acupuncture. Continue in conjunction with massages, PT, OT and speech therapy.   Patient tolerated treatment well today, trigger point in left traps was gone after treatment, patient felt relaxed.

## 2020-03-09 ENCOUNTER — THERAPY VISIT (OUTPATIENT)
Dept: CHIROPRACTIC MEDICINE | Facility: CLINIC | Age: 30
End: 2020-03-09
Payer: COMMERCIAL

## 2020-03-09 DIAGNOSIS — M99.02 SEGMENTAL DYSFUNCTION OF THORACIC REGION: ICD-10-CM

## 2020-03-09 DIAGNOSIS — M99.04 SEGMENTAL DYSFUNCTION OF SACRAL REGION: ICD-10-CM

## 2020-03-09 DIAGNOSIS — S13.4XXA SPRAIN OF LIGAMENTS OF CERVICAL SPINE: ICD-10-CM

## 2020-03-09 DIAGNOSIS — M99.03 SEGMENTAL DYSFUNCTION OF LUMBAR REGION: ICD-10-CM

## 2020-03-09 DIAGNOSIS — M99.01 SEGMENTAL DYSFUNCTION OF CERVICAL REGION: Primary | ICD-10-CM

## 2020-03-09 DIAGNOSIS — M62.838 MUSCLE SPASM: ICD-10-CM

## 2020-03-09 PROCEDURE — 98941 CHIROPRACT MANJ 3-4 REGIONS: CPT | Mod: AT | Performed by: CHIROPRACTOR

## 2020-03-09 NOTE — PROGRESS NOTES
"Visit #:  3 of 8 based on treatment plan 8/2/2019    Subjective:  Marie Yeboah is a 29 year old female who is seen in f/u up for:        Segmental dysfunction of cervical region  Segmental dysfunction of thoracic region  Segmental dysfunction of lumbar region  Segmental dysfunction of sacral region  Sprain of ligaments of cervical spine  Muscle spasm.     Since last visit on 3/4/2020,  Marie Yeboah reports the following changes: Patient presents and states that she is doing good after her first acupuncture treatment. She has not had any headaches. She feels like her head needs to \"pop\" and it would go away, but she continues to have tightness and pain. She rates her current pain 1/10.       Objective:  The following was observed:      P: palpatory tenderness L upper traps, behind right ear    A: static palpation demonstrates intersegmental asymmetry     R: motion palpation notes restricted motion    T: localized muscle spasm at: Sub-occipital and Traps R>>L      Assessment:    Segmental spinal dysfunction/restrictions found at:  C1 RR, LRR  C5 LR, RRR  T1 LR, RRR  T7 E, FR  T10 E, FR  Left SI posterior      Diagnoses:      1. Segmental dysfunction of cervical region    2. Segmental dysfunction of thoracic region    3. Segmental dysfunction of lumbar region    4. Segmental dysfunction of sacral region    5. Sprain of ligaments of cervical spine    6. Muscle spasm        Patient's condition:  Patient had restrictions pre-manipulation and Patient had decreased motion prior to manipulation    Treatment effectiveness:  Post manipulation there is better intersegmental movement and Patient claims to feel looser post manipulation      Procedures:  CMT:  84170 Chiropractic manipulative treatment 3-4 regions performed   Cervical: Gentle Mobilization, C1, C5, Supine  Thoracic: Diversified, T1, T7, T10,  Prone  Pelvis: Drop assist, Left SI, Prone    Modalities:  MSTM to affected musculature, on Left WITH " [employer]/HYPERVOLT  ACUPUNCTURE for cervical sprain, left trap hypertonicity and headaches: trigger point in left traps, GV20, GV16, LI4, SI15, GB 39, GB20, GB39 ( NOT performed today)      Therapeutic procedures:  Therapeutic Exercise: Cervical stretches and Bruegger's Relief - continue with these stretches throughout the day  Gave patient Ice instructions post adjustment, and instructions for acute care      Prognosis: Good    Progress towards Goals: Patient is making progress towards the goal of:  Decrease pain in neck and upper back from 7/10 to 3/10 in 4 treatments. GOAL MET  Reduce functional impairment in Neck Disability from 58% to 25% in 4 treatments.  Reduce intensity and frequency of headache. GOAL MET  Return to work. GOAL MET  Decrease hypertonicity in left upper traps and cervical musculature. (we have started acupuncture 3/4/2020)       Response to Treatment:   Patient tolerated treatment well today and is improving with care of OT, OT, massage, and we will initiate acupuncture today.      Recommendations:    Instructions:ice 20 minutes every other hour as needed    Follow-up:  Return to care in 1 week to continue adjustments and acupuncture. Continue in conjunction with massages, PT, OT and speech therapy.

## 2020-03-12 ENCOUNTER — TELEPHONE (OUTPATIENT)
Dept: PHYSICAL THERAPY | Facility: CLINIC | Age: 30
End: 2020-03-12

## 2020-03-18 ENCOUNTER — TELEPHONE (OUTPATIENT)
Dept: PHYSICAL THERAPY | Facility: CLINIC | Age: 30
End: 2020-03-18

## 2020-03-18 ENCOUNTER — THERAPY VISIT (OUTPATIENT)
Dept: CHIROPRACTIC MEDICINE | Facility: CLINIC | Age: 30
End: 2020-03-18
Payer: COMMERCIAL

## 2020-03-18 DIAGNOSIS — M99.01 SEGMENTAL DYSFUNCTION OF CERVICAL REGION: Primary | ICD-10-CM

## 2020-03-18 DIAGNOSIS — M99.03 SEGMENTAL DYSFUNCTION OF LUMBAR REGION: ICD-10-CM

## 2020-03-18 DIAGNOSIS — M62.838 MUSCLE SPASM: ICD-10-CM

## 2020-03-18 DIAGNOSIS — M99.02 SEGMENTAL DYSFUNCTION OF THORACIC REGION: ICD-10-CM

## 2020-03-18 DIAGNOSIS — S13.4XXA SPRAIN OF LIGAMENTS OF CERVICAL SPINE: ICD-10-CM

## 2020-03-18 DIAGNOSIS — M99.04 SEGMENTAL DYSFUNCTION OF SACRAL REGION: ICD-10-CM

## 2020-03-18 PROCEDURE — 97810 ACUP 1/> WO ESTIM 1ST 15 MIN: CPT | Performed by: CHIROPRACTOR

## 2020-03-18 PROCEDURE — 98941 CHIROPRACT MANJ 3-4 REGIONS: CPT | Mod: AT | Performed by: CHIROPRACTOR
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
## 2020-03-23 NOTE — PROGRESS NOTES
Outpatient Occupational Therapy Discharge Note     Patient: Marie Yeboah  : 1990    Beginning/End Dates of Reporting Period:  2019 to 3/23/2020    Referring Provider: Dr. Pita Garcia    Therapy Diagnosis: Increased difficulty with daily tasks with decreased performance as symptoms increase.    Client Self Report: Patient states that she has been doing well.      Objective Measurements:     Objective Measure: [employer]   Details: 55 hits 0 errors; 61 hits 0 errors.    Goals:     Goal Identifier Leisure/reading   Goal Description Patient will engage in 20 minutes of reading with use of accommodation with not increase in symptoms at least 3 times this reporting period.   Target Date 20   Date Met      Progress: goal not met.      Goal Identifier work   Goal Description Patient will report 3 consecutive 8 hour work shifts without increase in symptoms at least 2 times this reporting period.(this is day 4 of no symptom increase)   Target Date 20   Date Met      Progress: goal not met.      Goal Identifier accommodations   Goal Description Patient will demonstrate at least 4 visual fatigue/sound accommodations recommendations for symptom management to improve ability to complete IADL's for continued safe and meaningful participate in daily life during this reporting period.   Target Date 20   Date Met      Progress: goal not met.      Goal Identifier Coordination   Goal Description Patient will improve FM coordination as evidence by being less than 2 SD (21 seconds or less) from the norm for her age group for increased ease of IADL and work tasks during the reporting period.   Target Date 20   Date Met      Progress: goal not met.      Goal Identifier Divided attention/reaction speed   Goal Description Patient will complete 3 # sequence and 60+ light hits as tested by [employer] with no increase in symptoms (eye strain, headache, etc), in order to improve skills needed  (peripheral vision, divided attention, and reaction speed) for safety with IADL, driving, and work task.    Target Date 03/02/20   Date Met      Progress: goal not met.        Progress Toward Goals:   Progress limited due to only having 3 treatment sessions.      Plan:  Discharge from therapy.    Discharge:    Reason for Discharge: Patient chooses to discontinue therapy.  Patient has failed to schedule further appointments.    Discharge Plan: Patient to continue home program.    Thank you for your referral.     OLIVERIO Zamora [facility]  Occupational Therapy     Phone: [phone]  Email: [email or web address]

## 2020-04-07 ENCOUNTER — TELEPHONE (OUTPATIENT)
Dept: PHYSICAL THERAPY | Facility: CLINIC | Age: 30
End: 2020-04-07

## 2020-04-20 ENCOUNTER — TELEPHONE (OUTPATIENT)
Dept: PHYSICAL THERAPY | Facility: CLINIC | Age: 30
End: 2020-04-20
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
## 2020-04-23 ENCOUNTER — VIRTUAL VISIT (OUTPATIENT)
Dept: ORTHOPEDICS | Facility: CLINIC | Age: 30
End: 2020-04-23
Payer: COMMERCIAL

## 2020-04-23 VITALS — HEIGHT: 67 IN | BODY MASS INDEX: 39.71 KG/M2 | WEIGHT: 253 LBS

## 2020-04-23 DIAGNOSIS — S06.0X1S CONCUSSION WITH LOSS OF CONSCIOUSNESS OF 30 MINUTES OR LESS, SEQUELA (H): Primary | ICD-10-CM

## 2020-04-23 PROCEDURE — 99212 OFFICE O/P EST SF 10 MIN: CPT | Mod: 95 | Performed by: PHYSICAL MEDICINE & REHABILITATION

## 2020-04-23 RX ORDER — CYCLOBENZAPRINE HCL 5 MG
5 TABLET ORAL 3 TIMES DAILY PRN
Qty: 30 TABLET | Refills: 0 | Status: SHIPPED | OUTPATIENT
Start: 2020-04-23 | End: 2020-09-23

## 2020-04-23 ASSESSMENT — MIFFLIN-ST. JEOR: SCORE: 1905.23

## 2020-04-23 NOTE — PATIENT INSTRUCTIONS
Today's Plan of Care:  -Referral to Neurology. Our concussion  will reach out to you to schedule.   -Cyclobenzaprine (Flexeril). Taking this medication may cause sedation. Do not take prior to driving    Follow Up: As needed if symptoms fail to improve or worsen. Please call with any questions or concerns.

## 2020-04-28 ENCOUNTER — HOSPITAL ENCOUNTER (OUTPATIENT)
Dept: PHYSICAL THERAPY | Facility: CLINIC | Age: 30
Setting detail: THERAPIES SERIES
End: 2020-04-28
Attending: PHYSICAL MEDICINE & REHABILITATION
Payer: COMMERCIAL

## 2020-04-28 PROCEDURE — 97110 THERAPEUTIC EXERCISES: CPT | Mod: GP | Performed by: PHYSICAL THERAPIST

## 2020-04-28 PROCEDURE — 97140 MANUAL THERAPY 1/> REGIONS: CPT | Mod: GP | Performed by: PHYSICAL THERAPIST

## 2020-04-28 NOTE — PROGRESS NOTES
Outpatient Physical Therapy Progress Note     Patient: Marie Yeboah  : 1990    Beginning/End Dates of Reporting Period:  19 to 2020    Referring Provider: Pita Garcia MD    Therapy Diagnosis: COncussion symptoms of musculoskeletal issues, vision and dizziness     Client Self Report: Pt reports that since last in person visit neck pain and some word finding have been more bothersome. she notes motion sickness but not dizziness the way she used to have it.  Difficulty sleeping due to neck pain . She has been taking mm relaxant as prescribed at night which helps sleeping but still not normal waking where between 6 and 10 x. Melatonin did not help.  Sitting too long it hurts,  getting moving around helps.  L UT is where she point to with pain not in R side.  Still doing her chin tucks and being mindful of posture. Balance is much improved.     Objective Measurements:  Objective Measure: PE of neck:  Details: ROM WFL L ear to shoulder and L rotation are both limited 50% compared to R,  Shrug and abduction strength 5/5 (B),  Slight pain with resisted L side bend of neck.         Outcome Measures (most recent score):  Concussion Symptom Assessment (score out of 90). A higher score indicates greater impairment: 24    Goals:  Goal Identifier Assessments   Goal Description Marie will have her neck, concussion for dizziness assessment completed to determine proper activities for her and include fall and abuse screen   Target Date 20   Date Met  20   Progress:     Goal Identifier Dizziness   Goal Description Marie will be able to reduce her dizziness x 10 points on the DHI indicating improvement of her condition allowing her to drive, look around environment with less symptoms    Target Date 20   Date Met  19   Progress:     Goal Identifier Cervical Symptoms   Goal Description Marie will have full cervical AROM without increased symptoms allowing her to look around evironment  and complete her job tasks  with 50% decrease in symptoms   Target Date 01/20/20   Date Met  (L UT pain today at 4/10 at start of session HA also 4/10)   Progress:         Progress Toward Goals:   Progress this reporting period: Good          Plan:  Changes to therapy plan of care: Therapy interrupted between visit 10 and 11 due to Covid- 19 virus precautions. Pt has continued to do her homework and feels like her balance is back to almost normal. Her primary issues are neck pain, HA and sleeping.     Discharge:  No

## 2020-04-30 ENCOUNTER — APPOINTMENT (OUTPATIENT)
Dept: LAB | Facility: OTHER | Age: 30
End: 2020-04-30
Payer: COMMERCIAL

## 2020-04-30 ENCOUNTER — RESULTS ONLY (OUTPATIENT)
Dept: LAB | Age: 30
End: 2020-04-30

## 2020-05-02 LAB
COVID-19 SPIKE RBD ABY TITER: NORMAL
COVID-19 SPIKE RBD ABY: NEGATIVE

## 2020-05-05 ENCOUNTER — HOSPITAL ENCOUNTER (OUTPATIENT)
Dept: PHYSICAL THERAPY | Facility: CLINIC | Age: 30
Setting detail: THERAPIES SERIES
End: 2020-05-05
Attending: PHYSICAL MEDICINE & REHABILITATION
Payer: COMMERCIAL

## 2020-05-05 PROCEDURE — 97140 MANUAL THERAPY 1/> REGIONS: CPT | Mod: GP | Performed by: PHYSICAL THERAPIST

## 2020-05-12 ENCOUNTER — HOSPITAL ENCOUNTER (OUTPATIENT)
Dept: PHYSICAL THERAPY | Facility: CLINIC | Age: 30
Setting detail: THERAPIES SERIES
End: 2020-05-12
Attending: PHYSICAL MEDICINE & REHABILITATION
Payer: COMMERCIAL

## 2020-05-12 PROCEDURE — 97140 MANUAL THERAPY 1/> REGIONS: CPT | Mod: GP | Performed by: PHYSICAL THERAPIST

## 2020-05-12 PROCEDURE — 97110 THERAPEUTIC EXERCISES: CPT | Mod: GP | Performed by: PHYSICAL THERAPIST

## 2020-05-14 ENCOUNTER — VIRTUAL VISIT (OUTPATIENT)
Dept: FAMILY MEDICINE | Facility: CLINIC | Age: 30
End: 2020-05-14
Payer: COMMERCIAL

## 2020-05-14 DIAGNOSIS — I10 HYPERTENSION, GOAL BELOW 140/90: ICD-10-CM

## 2020-05-14 DIAGNOSIS — F41.9 ANXIETY: ICD-10-CM

## 2020-05-14 PROCEDURE — 99214 OFFICE O/P EST MOD 30 MIN: CPT | Mod: TEL | Performed by: NURSE PRACTITIONER

## 2020-05-14 RX ORDER — ESCITALOPRAM OXALATE 10 MG/1
10 TABLET ORAL DAILY
Qty: 90 TABLET | Refills: 0 | Status: SHIPPED | OUTPATIENT
Start: 2020-05-14 | End: 2021-03-12

## 2020-05-14 RX ORDER — HYDROCHLOROTHIAZIDE 25 MG/1
25 TABLET ORAL DAILY
Qty: 90 TABLET | Refills: 3 | Status: SHIPPED | OUTPATIENT
Start: 2020-05-14 | End: 2020-10-14 | Stop reason: SINTOL

## 2020-05-14 NOTE — PROGRESS NOTES
"Marie Yeboah is a 30 year old female who is being evaluated via a billable telephone visit.      The patient has been notified of following:     \"This telephone visit will be conducted via a call between you and your physician/provider. We have found that certain health care needs can be provided without the need for a physical exam.  This service lets us provide the care you need with a short phone conversation.  If a prescription is necessary we can send it directly to your pharmacy.  If lab work is needed we can place an order for that and you can then stop by our lab to have the test done at a later time.    Telephone visits are billed at different rates depending on your insurance coverage. During this emergency period, for some insurers they may be billed the same as an in-person visit.  Please reach out to your insurance provider with any questions.    If during the course of the call the physician/provider feels a telephone visit is not appropriate, you will not be charged for this service.\"    Patient has given verbal consent for Telephone visit?  Yes    What phone number would you like to be contacted at? Home     How would you like to obtain your AVS? Keon Hartman     Marie Yeboah is a 30 year old female who presents to clinic today for the following health issues:    HPI  Medication refill of the lexapro and hydrochlorothiazide. Patient is not having any trouble with any one of these medications.            Patient Active Problem List   Diagnosis     Hypertension, goal below 140/90     Obesity (BMI 35.0-39.9) with comorbidity (H)     Segmental dysfunction of cervical region     Segmental dysfunction of thoracic region     Segmental dysfunction of lumbar region     Segmental dysfunction of sacral region     Sprain of ligaments of cervical spine     Muscle spasm     Past Surgical History:   Procedure Laterality Date     C/SECTION, LOW TRANSVERSE      2015, 2016     HC REMOVAL OF TONSILS,<12 " Y/O         Social History     Tobacco Use     Smoking status: Former Smoker     Years: 5.00     Types: Cigarettes     Start date: 1/1/2007     Smokeless tobacco: Never Used     Tobacco comment: Very little use now, maybe once a month   Substance Use Topics     Alcohol use: Yes     Frequency: 2-4 times a month     Family History   Problem Relation Age of Onset     Hypertension Mother      Seizure Disorder Mother      Unknown/Adopted Mother      Hypertension Father      Heart Disease Father      Hyperlipidemia Father      Anxiety Disorder Father      Brain Tumor Paternal Grandfather      Prostate Cancer Paternal Grandfather          Current Outpatient Medications   Medication Sig Dispense Refill     cyclobenzaprine (FLEXERIL) 5 MG tablet Take 1 tablet (5 mg) by mouth 3 times daily as needed for muscle spasms 30 tablet 0     escitalopram (LEXAPRO) 10 MG tablet Take 1 tablet (10 mg) by mouth daily 90 tablet 0     hydrochlorothiazide (HYDRODIURIL) 25 MG tablet Take 1 tablet (25 mg) by mouth daily 90 tablet 3     levonorgestrel (MIRENA) 20 MCG/24HR IUD 1 each by Intrauterine route once       albuterol (PROAIR HFA/PROVENTIL HFA/VENTOLIN HFA) 108 (90 Base) MCG/ACT inhaler Inhale 2 puffs into the lungs every 6 hours       Allergies   Allergen Reactions     Contrast Dye Anaphylaxis     Bee Venom      Other reaction(s): Hives  Other reaction(s): Hives     BP Readings from Last 3 Encounters:   01/14/20 128/70   01/07/20 124/84   12/30/19 (!) 139/97    Wt Readings from Last 3 Encounters:   04/23/20 114.8 kg (253 lb)   01/14/20 114.3 kg (252 lb)   01/07/20 112.5 kg (248 lb)                    Reviewed and updated as needed this visit by Provider    Patient calling today for follow up of her hypertension and anxiety. She is doing well on the Hydrochlorothiazide 25 mg daily and the Lexapro 10 mg daily. She has no new symptoms of concern. Taking medication regularly. No new chest pain or palpitations, she did have heart  palpitations when she went up on the Lexapro but she is tolerating at a lower dose.     No other new acute symptoms of concern. She is however wanting to discuss starting phentermine, she has been thinking about trying to start and would like to as a way to jumpstart the weight loss is aware that this is not a long-term medication and she would need to focus mainly on diet and lifestyle changes.  No other acute symptoms of concern.           Review of Systems   Constitutional, HEENT, cardiovascular, pulmonary, gi and gu systems are negative, except as otherwise noted.       Objective   Reported vitals:  There were no vitals taken for this visit.   GENERAL: Telephone visit, exam deferred secondary to COVID restrictions, per our conversation patient appeared healthy, alert and no distress  PSYCH: Alert and oriented times 3; coherent speech, normal   rate and volume, able to articulate logical thoughts, able   to abstract reason, no tangential thoughts, no hallucinations   or delusions  Her affect is normal and pleasant  RESP: No cough, no audible wheezing, able to talk in full sentences  Remainder of exam unable to be completed due to telephone visits    Diagnostic Test Results:  Labs reviewed in Epic        Assessment/Plan:  1. Anxiety  -Well-controlled on current plan of care will continue, she was instructed to call with any new or worsening anxiety requiring any dose adjustment or medication change.  - escitalopram (LEXAPRO) 10 MG tablet; Take 1 tablet (10 mg) by mouth daily  Dispense: 90 tablet; Refill: 0    2. Hypertension, goal below 140/90  -Blood pressures well within goal range, systolic running under 140 diastolic 190s.  Did discuss the fact that the phentermine could cause blood pressure related to watch this closely and the fact that she would also like him to EKG..  -Patient will contact clinic if the blood pressure starts to trend up.  -Patient has not had a potassium in over a year next we will get  labs updated for the potassium  - hydrochlorothiazide (HYDRODIURIL) 25 MG tablet; Take 1 tablet (25 mg) by mouth daily  Dispense: 90 tablet; Refill: 3  - **Potassium FUTURE anytime; Future  Patient instructed to call clinic with new or worsening symptoms prior to her next follow up appointment.     Phone call duration:  10 minutes    David Rooney NP

## 2020-05-21 ENCOUNTER — HOSPITAL ENCOUNTER (OUTPATIENT)
Dept: PHYSICAL THERAPY | Facility: CLINIC | Age: 30
Setting detail: THERAPIES SERIES
End: 2020-05-21
Attending: PHYSICAL MEDICINE & REHABILITATION
Payer: COMMERCIAL

## 2020-05-21 PROCEDURE — 97110 THERAPEUTIC EXERCISES: CPT | Mod: GP | Performed by: PHYSICAL THERAPIST

## 2020-05-21 PROCEDURE — 97140 MANUAL THERAPY 1/> REGIONS: CPT | Mod: GP | Performed by: PHYSICAL THERAPIST

## 2020-05-22 ENCOUNTER — ALLIED HEALTH/NURSE VISIT (OUTPATIENT)
Dept: FAMILY MEDICINE | Facility: CLINIC | Age: 30
End: 2020-05-22
Payer: COMMERCIAL

## 2020-05-22 ENCOUNTER — TELEPHONE (OUTPATIENT)
Dept: FAMILY MEDICINE | Facility: CLINIC | Age: 30
End: 2020-05-22

## 2020-05-22 DIAGNOSIS — R63.4 WEIGHT LOSS: Primary | ICD-10-CM

## 2020-05-22 DIAGNOSIS — E87.6 HYPOKALEMIA: Primary | ICD-10-CM

## 2020-05-22 DIAGNOSIS — I10 HYPERTENSION, GOAL BELOW 140/90: ICD-10-CM

## 2020-05-22 DIAGNOSIS — I10 HYPERTENSION, GOAL BELOW 140/90: Primary | ICD-10-CM

## 2020-05-22 LAB
ALBUMIN SERPL-MCNC: 3.8 G/DL (ref 3.4–5)
ALP SERPL-CCNC: 57 U/L (ref 40–150)
ALT SERPL W P-5'-P-CCNC: 25 U/L (ref 0–50)
ANION GAP SERPL CALCULATED.3IONS-SCNC: 5 MMOL/L (ref 3–14)
AST SERPL W P-5'-P-CCNC: 14 U/L (ref 0–45)
BILIRUB SERPL-MCNC: 0.2 MG/DL (ref 0.2–1.3)
BUN SERPL-MCNC: 15 MG/DL (ref 7–30)
CALCIUM SERPL-MCNC: 8.1 MG/DL (ref 8.5–10.1)
CHLORIDE SERPL-SCNC: 106 MMOL/L (ref 94–109)
CO2 SERPL-SCNC: 26 MMOL/L (ref 20–32)
CREAT SERPL-MCNC: 0.7 MG/DL (ref 0.52–1.04)
GFR SERPL CREATININE-BSD FRML MDRD: >90 ML/MIN/{1.73_M2}
GLUCOSE SERPL-MCNC: 118 MG/DL (ref 70–99)
POTASSIUM SERPL-SCNC: 3.1 MMOL/L (ref 3.4–5.3)
PROT SERPL-MCNC: 7.9 G/DL (ref 6.8–8.8)
SODIUM SERPL-SCNC: 137 MMOL/L (ref 133–144)
TSH SERPL DL<=0.005 MIU/L-ACNC: 2.61 MU/L (ref 0.4–4)

## 2020-05-22 PROCEDURE — 93005 ELECTROCARDIOGRAM TRACING: CPT | Performed by: NURSE PRACTITIONER

## 2020-05-22 PROCEDURE — 84443 ASSAY THYROID STIM HORMONE: CPT | Performed by: NURSE PRACTITIONER

## 2020-05-22 PROCEDURE — 36415 COLL VENOUS BLD VENIPUNCTURE: CPT | Performed by: NURSE PRACTITIONER

## 2020-05-22 PROCEDURE — 80053 COMPREHEN METABOLIC PANEL: CPT | Performed by: NURSE PRACTITIONER

## 2020-05-22 PROCEDURE — 99207 ZZC NO CHARGE NURSE ONLY: CPT

## 2020-05-22 RX ORDER — POTASSIUM CHLORIDE 1500 MG/1
20 TABLET, EXTENDED RELEASE ORAL DAILY
Qty: 14 TABLET | Refills: 0 | Status: SHIPPED | OUTPATIENT
Start: 2020-05-22 | End: 2020-06-05

## 2020-05-22 NOTE — TELEPHONE ENCOUNTER
Discussed with patient.    Potassium 20 meq daily for 14 days. We will recheck next week.     David Rooney CNP

## 2020-06-01 ENCOUNTER — THERAPY VISIT (OUTPATIENT)
Dept: CHIROPRACTIC MEDICINE | Facility: CLINIC | Age: 30
End: 2020-06-01
Payer: COMMERCIAL

## 2020-06-01 DIAGNOSIS — M99.01 SEGMENTAL DYSFUNCTION OF CERVICAL REGION: Primary | ICD-10-CM

## 2020-06-01 DIAGNOSIS — M99.02 SEGMENTAL DYSFUNCTION OF THORACIC REGION: ICD-10-CM

## 2020-06-01 DIAGNOSIS — S13.4XXA SPRAIN OF LIGAMENTS OF CERVICAL SPINE: ICD-10-CM

## 2020-06-01 DIAGNOSIS — M62.838 MUSCLE SPASM: ICD-10-CM

## 2020-06-01 DIAGNOSIS — M99.03 SEGMENTAL DYSFUNCTION OF LUMBAR REGION: ICD-10-CM

## 2020-06-01 DIAGNOSIS — M99.04 SEGMENTAL DYSFUNCTION OF SACRAL REGION: ICD-10-CM

## 2020-06-01 PROCEDURE — 98941 CHIROPRACT MANJ 3-4 REGIONS: CPT | Mod: AT | Performed by: CHIROPRACTOR

## 2020-06-01 PROCEDURE — 99212 OFFICE O/P EST SF 10 MIN: CPT | Mod: 25 | Performed by: CHIROPRACTOR

## 2020-06-01 PROCEDURE — 97810 ACUP 1/> WO ESTIM 1ST 15 MIN: CPT | Performed by: CHIROPRACTOR
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
## 2020-06-08 ENCOUNTER — THERAPY VISIT (OUTPATIENT)
Dept: CHIROPRACTIC MEDICINE | Facility: CLINIC | Age: 30
End: 2020-06-08
Payer: COMMERCIAL

## 2020-06-08 DIAGNOSIS — S13.4XXA SPRAIN OF LIGAMENTS OF CERVICAL SPINE: ICD-10-CM

## 2020-06-08 DIAGNOSIS — M99.02 SEGMENTAL DYSFUNCTION OF THORACIC REGION: ICD-10-CM

## 2020-06-08 DIAGNOSIS — M62.838 MUSCLE SPASM: ICD-10-CM

## 2020-06-08 DIAGNOSIS — M99.03 SEGMENTAL DYSFUNCTION OF LUMBAR REGION: ICD-10-CM

## 2020-06-08 DIAGNOSIS — M99.04 SEGMENTAL DYSFUNCTION OF SACRAL REGION: ICD-10-CM

## 2020-06-08 DIAGNOSIS — M99.01 SEGMENTAL DYSFUNCTION OF CERVICAL REGION: Primary | ICD-10-CM

## 2020-06-08 PROCEDURE — 97810 ACUP 1/> WO ESTIM 1ST 15 MIN: CPT | Performed by: CHIROPRACTOR

## 2020-06-08 PROCEDURE — 98941 CHIROPRACT MANJ 3-4 REGIONS: CPT | Mod: AT | Performed by: CHIROPRACTOR
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
## 2020-06-11 ENCOUNTER — HOSPITAL ENCOUNTER (OUTPATIENT)
Dept: GENERAL RADIOLOGY | Facility: CLINIC | Age: 30
End: 2020-06-11
Attending: ANESTHESIOLOGY
Payer: COMMERCIAL

## 2020-06-11 ENCOUNTER — TELEPHONE (OUTPATIENT)
Dept: FAMILY MEDICINE | Facility: CLINIC | Age: 30
End: 2020-06-11

## 2020-06-11 DIAGNOSIS — M47.812 SPONDYLOSIS OF CERVICAL REGION WITHOUT MYELOPATHY OR RADICULOPATHY: ICD-10-CM

## 2020-06-11 DIAGNOSIS — R63.4 WEIGHT LOSS: Primary | ICD-10-CM

## 2020-06-11 DIAGNOSIS — E87.6 HYPOKALEMIA: ICD-10-CM

## 2020-06-11 LAB
ALBUMIN SERPL-MCNC: 3.8 G/DL (ref 3.4–5)
ALP SERPL-CCNC: 55 U/L (ref 40–150)
ALT SERPL W P-5'-P-CCNC: 32 U/L (ref 0–50)
ANION GAP SERPL CALCULATED.3IONS-SCNC: 8 MMOL/L (ref 3–14)
AST SERPL W P-5'-P-CCNC: 20 U/L (ref 0–45)
BILIRUB SERPL-MCNC: 0.3 MG/DL (ref 0.2–1.3)
BUN SERPL-MCNC: 10 MG/DL (ref 7–30)
CALCIUM SERPL-MCNC: 8.6 MG/DL (ref 8.5–10.1)
CHLORIDE SERPL-SCNC: 104 MMOL/L (ref 94–109)
CO2 SERPL-SCNC: 26 MMOL/L (ref 20–32)
CREAT SERPL-MCNC: 0.62 MG/DL (ref 0.52–1.04)
GFR SERPL CREATININE-BSD FRML MDRD: >90 ML/MIN/{1.73_M2}
GLUCOSE SERPL-MCNC: 100 MG/DL (ref 70–99)
POTASSIUM SERPL-SCNC: 3.5 MMOL/L (ref 3.4–5.3)
PROT SERPL-MCNC: 7.7 G/DL (ref 6.8–8.8)
SODIUM SERPL-SCNC: 138 MMOL/L (ref 133–144)

## 2020-06-11 PROCEDURE — 80053 COMPREHEN METABOLIC PANEL: CPT | Performed by: NURSE PRACTITIONER

## 2020-06-11 PROCEDURE — 72040 X-RAY EXAM NECK SPINE 2-3 VW: CPT | Mod: TC

## 2020-06-11 PROCEDURE — 36415 COLL VENOUS BLD VENIPUNCTURE: CPT | Performed by: NURSE PRACTITIONER

## 2020-06-11 RX ORDER — PHENTERMINE HYDROCHLORIDE 15 MG/1
CAPSULE ORAL
Qty: 45 CAPSULE | Refills: 0 | Status: SHIPPED | OUTPATIENT
Start: 2020-06-11 | End: 2020-07-08

## 2020-06-11 NOTE — TELEPHONE ENCOUNTER
Discussed labs with patient.    She will keep up with the banana's and we will recheck the potassium in 3 months.     Okay to start Phentermine. I will recheck her next month prior to refill.     David Rooney CNP

## 2020-06-29 ENCOUNTER — HOSPITAL ENCOUNTER (OUTPATIENT)
Dept: MRI IMAGING | Facility: CLINIC | Age: 30
Discharge: HOME | End: 2020-06-29
Attending: ANESTHESIOLOGY | Admitting: ANESTHESIOLOGY
Payer: COMMERCIAL

## 2020-06-29 DIAGNOSIS — M47.812 SPONDYLOSIS OF CERVICAL REGION WITHOUT MYELOPATHY OR RADICULOPATHY: ICD-10-CM

## 2020-06-29 PROCEDURE — 72141 MRI NECK SPINE W/O DYE: CPT

## 2020-07-06 ENCOUNTER — THERAPY VISIT (OUTPATIENT)
Dept: CHIROPRACTIC MEDICINE | Facility: CLINIC | Age: 30
End: 2020-07-06
Payer: COMMERCIAL

## 2020-07-06 DIAGNOSIS — M99.01 SEGMENTAL DYSFUNCTION OF CERVICAL REGION: Primary | ICD-10-CM

## 2020-07-06 DIAGNOSIS — M99.04 SEGMENTAL DYSFUNCTION OF SACRAL REGION: ICD-10-CM

## 2020-07-06 DIAGNOSIS — M99.03 SEGMENTAL DYSFUNCTION OF LUMBAR REGION: ICD-10-CM

## 2020-07-06 DIAGNOSIS — S13.4XXA SPRAIN OF LIGAMENTS OF CERVICAL SPINE: ICD-10-CM

## 2020-07-06 DIAGNOSIS — M99.02 SEGMENTAL DYSFUNCTION OF THORACIC REGION: ICD-10-CM

## 2020-07-06 DIAGNOSIS — M62.838 MUSCLE SPASM: ICD-10-CM

## 2020-07-06 PROCEDURE — 98941 CHIROPRACT MANJ 3-4 REGIONS: CPT | Mod: AT | Performed by: CHIROPRACTOR

## 2020-07-06 NOTE — PROGRESS NOTES
Visit #:  3 of 8 based on treatment plan 8/2/2019    Subjective:  Marie Yeboah is a 29 year old female who is seen in f/u up for:        Segmental dysfunction of cervical region  Segmental dysfunction of thoracic region  Segmental dysfunction of lumbar region  Segmental dysfunction of sacral region  Sprain of ligaments of cervical spine  Muscle spasm.     Since last visit on 6/8/2020,  Marie Yeboah reports the following changes: Patient presents and states that she is doing better since having injections in her neck at C3-C7. That was helpful and she felt looser. The pain has somewhat come back, but not as bad. She rates her current pain 2/10. She has not been having headaches.         Objective:  The following was observed:        P: palpatory tenderness L upper traps    A: static palpation demonstrates intersegmental asymmetry     R: motion palpation notes restricted motion    T: localized muscle spasm at: Sub-occipital and Traps L>>R      Assessment:    Segmental spinal dysfunction/restrictions found at:  C1 RR, LRR mobs  C2 LR, RRR mobs  T1 RR, LRR  T3 LR, RRR  T7 E, FR  T10 E, FR  L4 RR, LRR  Left SI posterior      Diagnoses:      1. Segmental dysfunction of cervical region    2. Segmental dysfunction of thoracic region    3. Segmental dysfunction of lumbar region    4. Segmental dysfunction of sacral region    5. Sprain of ligaments of cervical spine    6. Muscle spasm        Patient's condition:  Patient had restrictions pre-manipulation and Patient had decreased motion prior to manipulation    Treatment effectiveness:  Post manipulation there is better intersegmental movement and Patient claims to feel looser post manipulation      Procedures:  CMT:  53046 Chiropractic manipulative treatment 3-4 regions performed   Cervical: Gentle Mobilization, C1, C2, Supine  Thoracic: Diversified, T1, T3, T7, T10,  Prone  Pelvis: Drop assist, L4, Left SI, Prone    Modalities:  MSTM to affected musculature, on Left  WITH [employer]/HYPERVOLT  ACUPUNCTURE for cervical sprain, left trap hypertonicity and headaches: trigger point in left traps, GV20, GV16, LI4, SI15, GB 39, GB20, GB36, ahsi points in left upper traps - NOT performed today      Therapeutic procedures:  Therapeutic Exercise: Cervical stretches and Bruegger's Relief - continue with these stretches throughout the day  Gave patient Ice instructions post adjustment, and instructions for acute care      Prognosis: Good    Progress towards Goals: Patient is making progress towards the goal of:  Decrease pain in neck and upper back from 7/10 to 3/10 in 4 treatments. GOAL MET  Reduce functional impairment in Neck Disability from 58% to 25% in 4 treatments.  Reduce intensity and frequency of headache. GOAL MET  Return to work. GOAL MET  Decrease hypertonicity in left upper traps and cervical musculature. (we have started acupuncture 3/4/2020)       Response to Treatment:   Patient noticed improvement from the numbing effect of injections last week at C3-C7.       Recommendations:    Instructions:ice 20 minutes every other hour as needed    Follow-up:  Return to care in 1 week to continue adjustments and acupuncture.

## 2020-07-08 ENCOUNTER — TELEPHONE (OUTPATIENT)
Dept: FAMILY MEDICINE | Facility: CLINIC | Age: 30
End: 2020-07-08

## 2020-07-08 DIAGNOSIS — R63.4 WEIGHT LOSS: ICD-10-CM

## 2020-07-08 RX ORDER — PHENTERMINE HYDROCHLORIDE 15 MG/1
CAPSULE ORAL
Qty: 45 CAPSULE | Refills: 0 | Status: SHIPPED | OUTPATIENT
Start: 2020-07-08 | End: 2020-08-07

## 2020-07-08 NOTE — TELEPHONE ENCOUNTER
Can you fill this Dr. Hills in David's absence?    phentermine      Last Written Prescription Date:  06/11/20  Last Fill Quantity: 45,   # refills: 0  Last Office Visit: 05/14/20  Future Office visit:       Routing refill request to provider for review/approval because:  Drug not on the [facility], [facility] or [facility] refill protocol or controlled substance

## 2020-07-08 NOTE — TELEPHONE ENCOUNTER
Please clarify dose for Phentermine 15mg capsules. Rx was sent with same directions as last month. Is patient supposed to continue on 2 capsules daily or restart taper? Please send new Rx to [facility] if dose is supposed to be continued at 2 capsules daily.    Thanks

## 2020-07-21 ENCOUNTER — TELEPHONE (OUTPATIENT)
Dept: PHYSICAL THERAPY | Facility: CLINIC | Age: 30
End: 2020-07-21

## 2020-07-28 ENCOUNTER — VIRTUAL VISIT (OUTPATIENT)
Dept: FAMILY MEDICINE | Facility: OTHER | Age: 30
End: 2020-07-28
Payer: COMMERCIAL

## 2020-07-28 PROCEDURE — 99421 OL DIG E/M SVC 5-10 MIN: CPT | Performed by: PHYSICIAN ASSISTANT

## 2020-07-28 NOTE — DISCHARGE SUMMARY
Outpatient Physical Therapy Discharge Note     Patient: Marie Yeboah  : 1990    Beginning/End Dates of Reporting Period:  14 total sessions between 2020 and 2020    Referring Provider: Pita Garcia MD    Therapy Diagnosis: COncussion symptoms of musculoskeletal issues, vision and dizziness     Client Self Report: At the time of her last session, she reported:   Reports she is not sure what is creating the L upper back symptoms. THe stretches do help the headache. She cannot identify the cause of the symptoms. First rib release L relieved the symptoms but does not last more than 1-2 days.     Objective Measurements:  At the time of her last session:  Objective Measure: PE of neck:  Details: supine SB cervical: 45% L; R: 100%  Objective Measure: Cervical AROM supine  Details: pre manual techniques: 45% L and 100% R; post manual therapy: 70% L and in sitting equal AROM without symptoms for L cervical SB]    She did not return voice message regarding plan for PT left by PT last week and did not schedule further appt.           Goals:  Goal Identifier Assessments   Goal Description Marie will have her neck, concussion for dizziness assessment completed to determine proper activities for her and include fall and abuse screen   Target Date 20   Date Met  20   Progress:     Goal Identifier Dizziness   Goal Description Marie will be able to reduce her dizziness x 10 points on the DHI indicating improvement of her condition allowing her to drive, look around environment with less symptoms    Target Date 20   Date Met  19   Progress:     Goal Identifier Cervical Symptoms   Goal Description Marie will have full cervical AROM without increased symptoms allowing her to look around evironment and complete her job tasks  with 50% decrease in symptoms   Target Date 20   Date Met  (L UT pain today at 4/10 at start of session HA also 4/10)   Progress:     Goal Identifier Work    Goal Description Patient will be able to tolerate full 8 hour work day without increased L knee pain above 1/10.    Target Date 06/20/20   Date Met  (pain increases up to 3/10 in work day)   Progress:     Progress Toward Goals:   Not assessed this period.      Plan:  Discharge from therapy.    Discharge:     Reason for Discharge: Patient has failed to schedule further appointments.    Equipment Issued: None    Discharge Plan: Patient to continue home program.

## 2020-07-29 DIAGNOSIS — Z20.822 SUSPECTED 2019 NOVEL CORONAVIRUS INFECTION: Primary | ICD-10-CM

## 2020-07-29 DIAGNOSIS — Z20.822 SUSPECTED 2019 NOVEL CORONAVIRUS INFECTION: ICD-10-CM

## 2020-07-29 PROCEDURE — U0003 INFECTIOUS AGENT DETECTION BY NUCLEIC ACID (DNA OR RNA); SEVERE ACUTE RESPIRATORY SYNDROME CORONAVIRUS 2 (SARS-COV-2) (CORONAVIRUS DISEASE [COVID-19]), AMPLIFIED PROBE TECHNIQUE, MAKING USE OF HIGH THROUGHPUT TECHNOLOGIES AS DESCRIBED BY CMS-2020-01-R: HCPCS | Performed by: FAMILY MEDICINE

## 2020-07-29 NOTE — PROGRESS NOTES
"Date: 2020 21:00:31  Clinician: Ramiro Andrade  Clinician NPI: [id number]  Patient: Marie Yeboah  Patient : 1990  Patient Address: [address] [place] [address]  Patient Phone: [phone]  Visit Protocol: URI  Patient Summary:  Marie is a 30 year old ( : 1990 ) female who initiated a Visit for COVID-19 (Coronavirus) evaluation and screening. When asked the question \"Please sign me up to receive news, health information and promotions from [employer].\", Marie responded \"No\".    Marie states her symptoms started 1-2 days ago.   Her symptoms consist of a sore throat, a cough, malaise, and a headache. She is experiencing mild difficulty breathing with activities but can speak normally in full sentences.   Symptom details     Cough: Marie coughs a few times an hour and her cough is not more bothersome at night. Phlegm does not come into her throat when she coughs. She does not believe her cough is caused by post-nasal drip.     Sore throat: Marie reports having mild throat pain (1-3 on a 10 point pain scale), does not have exudate on her tonsils, and can swallow liquids. The lymph nodes in her neck are not enlarged. A rash has not appeared on the skin since the sore throat started.     Headache: She states the headache is mild (1-3 on a 10 point pain scale).      Marie denies having wheezing, nausea, teeth pain, ageusia, diarrhea, myalgias, anosmia, facial pain or pressure, fever, nasal congestion, vomiting, rhinitis, ear pain, chills, and enlarged lymph nodes. She also denies having recent facial or sinus surgery in the past 60 days and taking antibiotic medication in the past month.   Precipitating events  Within the past week, Marie has not been exposed to someone with strep throat. She has not recently been exposed to someone with influenza. Marie has been in close contact with the following high risk individuals: children under the age of 5.   Pertinent COVID-19 (Coronavirus) " information  In the past 14 days, Marie has not worked in a congregate living setting.   She either works or volunteers as a healthcare worker or a , or works or volunteers in a healthcare facility. She provides direct patient care. 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 She does not live with a healthcare worker.   Marie has had a close contact with a laboratory-confirmed COVID-19 patient within 14 days of symptom onset. She was not exposed at her work. Additional information about contact with COVID-19 (Coronavirus) patient as reported by the patient (free text): Spent time with 2 confirmed positive causes. Had dinner, sat around and talked in a hotel lobby for 2 nights. Rode a bus with both for 15 minutes.   Pertinent medical history  Marie does not get yeast infections when she takes antibiotics.   Marie does not need a return to work/school note.   Weight: 249 lbs   Marie does not smoke or use smokeless tobacco.   She denies pregnancy and denies breastfeeding. She is currently menstruating.   Weight: 249 lbs    MEDICATIONS: methocarbamol oral, hydrochlorothiazide oral, phentermine HCl (bulk), Lexapro oral, ALLERGIES: NKDA  Clinician Response:  Dear Marie,   Your symptoms show that you may have coronavirus (COVID-19). This illness can cause fever, cough and trouble breathing. Many people get a mild case and get better on their own. Some people can get very sick.  What should I do?  We would like to test you for this virus.   1. Please call [phone] to schedule your visit. Explain that you were referred by [facility] to have a COVID-19 test. Be ready to share your [facility] visit ID number.  The following will serve as your written order for this COVID Test, ordered by me, for the indication of suspected COVID  "[[id number]]: The test will be ordered in [employer], our electronic health record, after you are scheduled. It will show as ordered and authorized by Jez Braxton MD.  Order: COVID-19 (Coronavirus) PCR for SYMPTOMATIC testing from [facility].      2. When it's time for your COVID test:  Stay at least 6 feet away from others. (If someone will drive you to your test, stay in the backseat, as far away from the  as you can.)   Cover your mouth and nose with a mask, tissue or washcloth.  Go straight to the testing site. Don't make any stops on the way there or back.      3.Starting now: Stay home and away from others (self-isolate) until:   You've had no fever---and no medicine that reduces fever---for 3 full days (72 hours). And...   Your other symptoms have gotten better. For example, your cough or breathing has improved. And...   At least 10 days have passed since your symptoms started.       During this time, don't leave the house except for testing or medical care.   Stay in your own room, even for meals. Use your own bathroom if you can.   Stay away from others in your home. No hugging, kissing or shaking hands. No visitors.  Don't go to work, school or anywhere else.    Clean \"high touch\" surfaces often (doorknobs, counters, handles, etc.). Use a household cleaning spray or wipes. You'll find a full list of  on the EPA website: www.epa.gov/pesticide-registration/list-n-disinfectants-use-against-sars-cov-2.   Cover your mouth and nose with a mask, tissue or washcloth to avoid spreading germs.  Wash your hands and face often. Use soap and water.  Caregivers in these groups are at risk for severe illness due to COVID-19:  o People 65 years and older  o People who live in a nursing home or long-term care facility  o People with chronic disease (lung, heart, cancer, diabetes, kidney, liver, immunologic)  o People who have a weakened immune system, including those who:   Are in cancer treatment  Take medicine that " weakens the immune system, such as corticosteroids  Had a bone marrow or organ transplant  Have an immune deficiency  Have poorly controlled HIV or AIDS  Are obese (body mass index of 40 or higher)  Smoke regularly   o Caregivers should wear gloves while washing dishes, handling laundry and cleaning bedrooms and bathrooms.  o Use caution when washing and drying laundry: Don't shake dirty laundry, and use the warmest water setting that you can.  o For more tips, go to www.cdc.gov/coronavirus/2019-ncov/downloads/10Things.pdf.    4.Sign up for [employer]. We know it's scary to hear that you might have COVID-19. We want to track your symptoms to make sure you're okay over the next 2 weeks. Please look for an email from [employer]---this is a free, online program that we'll use to keep in touch. To sign up, follow the link in the email. Learn more at [email or web address]  How can I take care of myself?   Get lots of rest. Drink extra fluids (unless a doctor has told you not to).   Take Tylenol (acetaminophen) for fever or pain. If you have liver or kidney problems, ask your family doctor if it's okay to take Tylenol.   Adults can take either:    650 mg (two 325 mg pills) every 4 to 6 hours, or...   1,000 mg (two 500 mg pills) every 8 hours as needed.    Note: Don't take more than 3,000 mg in one day. Acetaminophen is found in many medicines (both prescribed and over-the-counter medicines). Read all labels to be sure you don't take too much.   For children, check the Tylenol bottle for the right dose. The dose is based on the child's age or weight.    If you have other health problems (like cancer, heart failure, an organ transplant or severe kidney disease): Call your specialty clinic if you don't feel better in the next 2 days.       Know when to call 911. Emergency warning signs include:    Trouble breathing or shortness of breath Pain or pressure in the chest that doesn't go away Feeling confused like  you haven't felt before, or not being able to wake up Bluish-colored lips or face.  Where can I get more information?   [facility] -- About COVID-19: [email or web address]   CDC -- What to Do If You're Sick: www.cdc.gov/coronavirus/2019-ncov/about/steps-when-sick.html   CDC -- Ending Home Isolation: www.cdc.gov/coronavirus/2019-ncov/hcp/disposition-in-home-patients.html   [facility] -- Caring for Someone: www.cdc.gov/coronavirus/2019-ncov/if-you-are-sick/care-for-someone.html   [facility] -- Interim Guidance for Hospital Discharge to Home: www.[facility].[place]/diseases/coronavirus/hcp/hospdischarge.pdf   [facility] clinical trials (COVID-19 research studies): clinicalaffairs.[facility]-clinical-trials    Below are the COVID-19 hotlines at the [facility] ([facility]). Interpreters are available.    For health questions: Call 062-850-9778 or 1-363.596.8950 (7 a.m. to 7 p.m.) For questions about schools and childcare: Call 521-328-3632 or 1-827.495.7030 (7 a.m. to 7 p.m.)    Diagnosis: Cough  Diagnosis ICD: R05

## 2020-07-30 ENCOUNTER — TELEPHONE (OUTPATIENT)
Dept: FAMILY MEDICINE | Facility: CLINIC | Age: 30
End: 2020-07-30

## 2020-07-30 LAB
SARS-COV-2 RNA SPEC QL NAA+PROBE: NOT DETECTED
SPECIMEN SOURCE: NORMAL

## 2020-07-30 NOTE — TELEPHONE ENCOUNTER
Patient called requesting lab results. Patient will call [facility] to get results. [place] LPN........7/30/2020 11:20 AM

## 2020-08-04 ENCOUNTER — E-VISIT (OUTPATIENT)
Dept: FAMILY MEDICINE | Facility: CLINIC | Age: 30
End: 2020-08-04
Payer: COMMERCIAL

## 2020-08-04 DIAGNOSIS — J40 BRONCHITIS: Primary | ICD-10-CM

## 2020-08-04 PROCEDURE — 99421 OL DIG E/M SVC 5-10 MIN: CPT | Performed by: NURSE PRACTITIONER

## 2020-08-04 RX ORDER — AZITHROMYCIN 250 MG/1
TABLET, FILM COATED ORAL
Qty: 6 TABLET | Refills: 0 | Status: SHIPPED | OUTPATIENT
Start: 2020-08-04 | End: 2020-08-09

## 2020-08-04 NOTE — TELEPHONE ENCOUNTER
Placed order for Zpack for Bronchitis. If she is not improved in 7 -10 days she should be seen in clinic.     Kathy MOLINA      Provider E-Visit time total (minutes): 5

## 2020-08-05 ENCOUNTER — TELEPHONE (OUTPATIENT)
Dept: FAMILY MEDICINE | Facility: CLINIC | Age: 30
End: 2020-08-05

## 2020-08-05 DIAGNOSIS — Z20.822 SUSPECTED COVID-19 VIRUS INFECTION: Primary | ICD-10-CM

## 2020-08-05 DIAGNOSIS — Z20.822 SUSPECTED COVID-19 VIRUS INFECTION: ICD-10-CM

## 2020-08-05 DIAGNOSIS — R50.9 FEVER, UNSPECIFIED FEVER CAUSE: ICD-10-CM

## 2020-08-05 PROCEDURE — U0003 INFECTIOUS AGENT DETECTION BY NUCLEIC ACID (DNA OR RNA); SEVERE ACUTE RESPIRATORY SYNDROME CORONAVIRUS 2 (SARS-COV-2) (CORONAVIRUS DISEASE [COVID-19]), AMPLIFIED PROBE TECHNIQUE, MAKING USE OF HIGH THROUGHPUT TECHNOLOGIES AS DESCRIBED BY CMS-2020-01-R: HCPCS | Performed by: FAMILY MEDICINE

## 2020-08-06 ENCOUNTER — TELEPHONE (OUTPATIENT)
Dept: FAMILY MEDICINE | Facility: CLINIC | Age: 30
End: 2020-08-06

## 2020-08-06 LAB
SARS-COV-2 RNA SPEC QL NAA+PROBE: NOT DETECTED
SPECIMEN SOURCE: NORMAL

## 2020-08-06 NOTE — TELEPHONE ENCOUNTER
Patient called requesting lab results. Patient informed results are still in process. Will let patient know when results are completed.    Edgar Villanueva, [facility]

## 2020-08-06 NOTE — TELEPHONE ENCOUNTER
Patient called again looking for results. Advised that results are not back. They are still in process.    Edgar Villanueva, CMA

## 2020-08-07 NOTE — RESULT ENCOUNTER NOTE
Marie, your COVID test is negative. I hope you're feeling better! Call if you need anything.   Rhoda Redman MD

## 2020-09-20 ENCOUNTER — HOSPITAL ENCOUNTER (EMERGENCY)
Facility: CLINIC | Age: 30
Discharge: HOME | End: 2020-09-20
Attending: EMERGENCY MEDICINE | Admitting: EMERGENCY MEDICINE
Payer: COMMERCIAL

## 2020-09-20 VITALS
BODY MASS INDEX: 39.16 KG/M2 | DIASTOLIC BLOOD PRESSURE: 77 MMHG | WEIGHT: 250 LBS | TEMPERATURE: 98.4 F | OXYGEN SATURATION: 98 % | RESPIRATION RATE: 16 BRPM | HEART RATE: 82 BPM | SYSTOLIC BLOOD PRESSURE: 143 MMHG

## 2020-09-20 DIAGNOSIS — T78.40XA ALLERGIC REACTION, INITIAL ENCOUNTER: ICD-10-CM

## 2020-09-20 PROCEDURE — 99284 EMERGENCY DEPT VISIT MOD MDM: CPT | Mod: Z6 | Performed by: EMERGENCY MEDICINE

## 2020-09-20 PROCEDURE — 96372 THER/PROPH/DIAG INJ SC/IM: CPT | Mod: 59 | Performed by: EMERGENCY MEDICINE

## 2020-09-20 PROCEDURE — 96375 TX/PRO/DX INJ NEW DRUG ADDON: CPT | Performed by: EMERGENCY MEDICINE

## 2020-09-20 PROCEDURE — 99285 EMERGENCY DEPT VISIT HI MDM: CPT | Mod: 25 | Performed by: EMERGENCY MEDICINE

## 2020-09-20 PROCEDURE — 25000125 ZZHC RX 250: Performed by: EMERGENCY MEDICINE

## 2020-09-20 PROCEDURE — 25000128 H RX IP 250 OP 636: Performed by: EMERGENCY MEDICINE

## 2020-09-20 PROCEDURE — 96374 THER/PROPH/DIAG INJ IV PUSH: CPT | Performed by: EMERGENCY MEDICINE

## 2020-09-20 RX ORDER — DIPHENHYDRAMINE HYDROCHLORIDE 50 MG/ML
50 INJECTION INTRAMUSCULAR; INTRAVENOUS ONCE
Status: COMPLETED | OUTPATIENT
Start: 2020-09-20 | End: 2020-09-20

## 2020-09-20 RX ORDER — METHYLPREDNISOLONE SODIUM SUCCINATE 125 MG/2ML
125 INJECTION, POWDER, LYOPHILIZED, FOR SOLUTION INTRAMUSCULAR; INTRAVENOUS ONCE
Status: COMPLETED | OUTPATIENT
Start: 2020-09-20 | End: 2020-09-20

## 2020-09-20 RX ORDER — DIPHENHYDRAMINE HCL 25 MG
25 TABLET ORAL EVERY 6 HOURS PRN
COMMUNITY
End: 2021-08-02

## 2020-09-20 RX ORDER — LIDOCAINE 40 MG/G
CREAM TOPICAL
Status: DISCONTINUED | OUTPATIENT
Start: 2020-09-20 | End: 2020-09-20 | Stop reason: HOSPADM

## 2020-09-20 RX ORDER — CETIRIZINE HYDROCHLORIDE 10 MG/1
10 TABLET ORAL DAILY
COMMUNITY
End: 2021-08-27

## 2020-09-20 RX ADMIN — METHYLPREDNISOLONE SODIUM SUCCINATE 125 MG: 125 INJECTION, POWDER, FOR SOLUTION INTRAMUSCULAR; INTRAVENOUS at 13:25

## 2020-09-20 RX ADMIN — DIPHENHYDRAMINE HYDROCHLORIDE 50 MG: 50 INJECTION, SOLUTION INTRAMUSCULAR; INTRAVENOUS at 13:19

## 2020-09-20 RX ADMIN — EPINEPHRINE 0.3 MG: 1 INJECTION INTRAMUSCULAR; INTRAVENOUS; SUBCUTANEOUS at 13:22

## 2020-09-20 NOTE — ED AVS SNAPSHOT
[facility] Emergency Department  [address] [place] [address]  Phone:  [phone]  Fax:  [phone]                                    Marie Yeboah   MRN: [id number]    Department:  [facility] Emergency Department   Date of Visit:  9/20/2020           After Visit Summary Signature Page    I have received my discharge instructions, and my questions have been answered. I have discussed any challenges I see with this plan with the nurse or doctor.    ..........................................................................................................................................  Patient/Patient Representative Signature      ..........................................................................................................................................  Patient Representative Print Name and Relationship to Patient    ..................................................               ................................................  Date                                   Time    ..........................................................................................................................................  Reviewed by Signature/Title    ...................................................              ..............................................  Date                                               Time          22EPIC Rev 08/18

## 2020-09-20 NOTE — ED TRIAGE NOTES
Developed itching and hives after eating some canned soup.  Took benadryl and zyrtec.  Patient's airway, breathing, circulation, and disability/mental status (ABCDs) intact/WDL during triage.

## 2020-09-20 NOTE — ED PROVIDER NOTES
History     Chief Complaint   Patient presents with     Allergic Reaction     HPI  Marie Yeboah is a 30 year old female who presents with allergic reaction.  Approximately 45 minutes ago she began to develop hives and itching.  She is unsure what this is from.  No new medications.  She did have some canned soup.  She has tried 1 oral Benadryl without relief.  No recent infections.  She does have some mild throat tightness.  No difficulty breathing.    Allergies:  Allergies   Allergen Reactions     Contrast Dye Anaphylaxis     Bee Venom      Other reaction(s): Hives  Other reaction(s): Hives       Problem List:    Patient Active Problem List    Diagnosis Date Noted     Segmental dysfunction of cervical region 08/02/2019     Priority: Medium     Segmental dysfunction of thoracic region 08/02/2019     Priority: Medium     Segmental dysfunction of lumbar region 08/02/2019     Priority: Medium     Segmental dysfunction of sacral region 08/02/2019     Priority: Medium     Sprain of ligaments of cervical spine 08/02/2019     Priority: Medium     Muscle spasm 08/02/2019     Priority: Medium     Hypertension, goal below 140/90 10/12/2018     Priority: Medium     Obesity (BMI 35.0-39.9) with comorbidity (H) 10/12/2018     Priority: Medium        Past Medical History:    Past Medical History:   Diagnosis Date     Anxiety      Depressive disorder 2007     HTN (hypertension)        Past Surgical History:    Past Surgical History:   Procedure Laterality Date     C/SECTION, LOW TRANSVERSE      2015, 2016     HC REMOVAL OF TONSILS,<11 Y/O         Family History:    Family History   Problem Relation Age of Onset     Hypertension Mother      Seizure Disorder Mother      Unknown/Adopted Mother      Hypertension Father      Heart Disease Father      Hyperlipidemia Father      Anxiety Disorder Father      Brain Tumor Paternal Grandfather      Prostate Cancer Paternal Grandfather        Social History:  Marital Status:    [2]  Social History     Tobacco Use     Smoking status: Current Some Day Smoker     Years: 5.00     Types: Cigarettes     Start date: 1/1/2007     Smokeless tobacco: Never Used     Tobacco comment: Very little use now, maybe once a month   Substance Use Topics     Alcohol use: Yes     Frequency: 2-4 times a month     Drug use: No        Medications:    cetirizine (ZYRTEC) 10 MG tablet  diphenhydrAMINE (BENADRYL) 25 MG tablet  albuterol (PROAIR HFA/PROVENTIL HFA/VENTOLIN HFA) 108 (90 Base) MCG/ACT inhaler  cyclobenzaprine (FLEXERIL) 5 MG tablet  escitalopram (LEXAPRO) 10 MG tablet  hydrochlorothiazide (HYDRODIURIL) 25 MG tablet  levonorgestrel (MIRENA) 20 MCG/24HR IUD          Review of Systems  All other systems are reviewed and are negative    Physical Exam   BP: (!) 151/100(large cuff left arm)  Pulse: 83  Temp: 98.4  F (36.9  C)  Resp: 16  Weight: 113.4 kg (250 lb)  SpO2: 99 %      Physical Exam  Vitals signs and nursing note reviewed.   Constitutional:       General: She is not in acute distress.     Appearance: She is well-developed. She is not diaphoretic.   HENT:      Head: Normocephalic and atraumatic.      Mouth/Throat:      Pharynx: No pharyngeal swelling, oropharyngeal exudate or uvula swelling.      Comments: Bilateral facial swelling, greater on the right than left inferior to the eye.  Eyes:      General: No scleral icterus.     Pupils: Pupils are equal, round, and reactive to light.   Neck:      Musculoskeletal: Normal range of motion and neck supple.   Cardiovascular:      Rate and Rhythm: Normal rate and regular rhythm.      Heart sounds: Normal heart sounds. No murmur.   Pulmonary:      Effort: No respiratory distress.      Breath sounds: No stridor. No wheezing or rales.   Abdominal:      Palpations: Abdomen is soft.      Tenderness: There is no abdominal tenderness.   Musculoskeletal:         General: No tenderness.   Skin:     General: Skin is warm and dry.      Coloration: Skin is not pale.       Findings: No erythema or rash.      Comments: Diffuse hive-like reaction.   Neurological:      Mental Status: She is alert.         ED Course        Procedures               Critical Care time:  none               No results found for this or any previous visit (from the past 24 hour(s)).    Medications   lidocaine 1 % 0.1-1 mL (has no administration in time range)   lidocaine (LMX4) kit (has no administration in time range)   sodium chloride (PF) 0.9% PF flush 3 mL (has no administration in time range)   sodium chloride (PF) 0.9% PF flush 3 mL (3 mLs Intracatheter Given 9/20/20 1330)   EPINEPHrine (ADRENALIN) kit 0.3 mg (0.3 mg Intramuscular Given 9/20/20 1322)   diphenhydrAMINE (BENADRYL) injection 50 mg (50 mg Intravenous Given 9/20/20 1319)   methylPREDNISolone sodium succinate (solu-MEDROL) injection 125 mg (125 mg Intravenous Given 9/20/20 1325)       Assessments & Plan (with Medical Decision Making)  30-year-old female with idiopathic generalized allergic reaction after eating some soup as potential cause.  Improved here with epinephrine, Benadryl and Solu-Medrol.  Observed for over 2 hours and continued to improve without any difficulty swallowing or breathing.  No signs of ongoing emergency medical condition.  Appears stable for discharge.  May use Benadryl as needed.  Return if condition worsens or other concern.     I have reviewed the nursing notes.    I have reviewed the findings, diagnosis, plan and need for follow up with the patient.       New Prescriptions    No medications on file       Final diagnoses:   Allergic reaction, initial encounter       9/20/2020   [facility] EMERGENCY DEPARTMENT     Edilson Oliveira MD  09/20/20 4200

## 2020-09-21 DIAGNOSIS — T78.2XXA ACUTE ANAPHYLAXIS, INITIAL ENCOUNTER: Primary | ICD-10-CM

## 2020-09-23 ENCOUNTER — OFFICE VISIT (OUTPATIENT)
Dept: ALLERGY | Facility: OTHER | Age: 30
End: 2020-09-23
Payer: COMMERCIAL

## 2020-09-23 VITALS
DIASTOLIC BLOOD PRESSURE: 92 MMHG | BODY MASS INDEX: 39.24 KG/M2 | HEART RATE: 82 BPM | WEIGHT: 250 LBS | OXYGEN SATURATION: 96 % | HEIGHT: 67 IN | SYSTOLIC BLOOD PRESSURE: 138 MMHG

## 2020-09-23 DIAGNOSIS — H10.9 RHINOCONJUNCTIVITIS: Primary | ICD-10-CM

## 2020-09-23 DIAGNOSIS — J31.0 RHINOCONJUNCTIVITIS: Primary | ICD-10-CM

## 2020-09-23 DIAGNOSIS — T78.2XXD ANAPHYLAXIS, SUBSEQUENT ENCOUNTER: ICD-10-CM

## 2020-09-23 DIAGNOSIS — I10 HYPERTENSION, GOAL BELOW 140/90: ICD-10-CM

## 2020-09-23 PROBLEM — T78.2XXA ANAPHYLAXIS: Status: ACTIVE | Noted: 2020-09-23

## 2020-09-23 LAB — MISCELLANEOUS TEST: NORMAL

## 2020-09-23 PROCEDURE — 99000 SPECIMEN HANDLING OFFICE-LAB: CPT | Performed by: ALLERGY & IMMUNOLOGY

## 2020-09-23 PROCEDURE — 36415 COLL VENOUS BLD VENIPUNCTURE: CPT | Performed by: ALLERGY & IMMUNOLOGY

## 2020-09-23 PROCEDURE — 99244 OFF/OP CNSLTJ NEW/EST MOD 40: CPT | Performed by: ALLERGY & IMMUNOLOGY

## 2020-09-23 PROCEDURE — 86003 ALLG SPEC IGE CRUDE XTRC EA: CPT | Mod: 90 | Performed by: ALLERGY & IMMUNOLOGY

## 2020-09-23 PROCEDURE — 86003 ALLG SPEC IGE CRUDE XTRC EA: CPT | Mod: 59 | Performed by: ALLERGY & IMMUNOLOGY

## 2020-09-23 RX ORDER — EPINEPHRINE 0.3 MG/.3ML
0.3 INJECTION SUBCUTANEOUS PRN
Qty: 2 EACH | Refills: 1 | Status: SHIPPED | OUTPATIENT
Start: 2020-09-23 | End: 2022-05-13

## 2020-09-23 ASSESSMENT — MIFFLIN-ST. JEOR: SCORE: 1891.62

## 2020-09-23 NOTE — PATIENT INSTRUCTIONS
Allergy Staff Appt Hours Shot Hours Locations    Physician     Judd Carrasco DO       Support Staff     TRICIA Junior, SALMA  Tuesday:        [place] 7-4:20     Wednesday:        [place]: 7-5     Thursday:                    [place] 7-6:40     Friday:  [place]  7-2:40   [place]        Thursday: 1-5:50        Friday: 7-10:50     [place]        Tuesday: 7- 3:20        Wednesday: 7-4:20     [place] Monday: 7-4:20        Tuesday: 1-6:20         [facility]  [address] [place] [address]  Appt Line: [phone]  Allergy RN:  [phone]    [facility]  [address] [place] [address]  Appt Line: [phone]  Allergy RN:  [phone]       Important Scheduling Information  Aspirin Desensitization: Appt will last 2 clinic days. Please call the Allergy RN line for your clinic to schedule. Discontinue antihistamines 7 days prior to the appointment.     Food Challenges: Appt will last 3-4 hours. Please call the Allergy RN line for your clinic to schedule. Discontinue antihistamines 7 days prior to the appointment.     Penicillin Testing: Appt will last 2-3 hours. Please call the Allergy RN line for your clinic to schedule. Discontinue antihistamines 7 days prior to the appointment.     Skin Testing: Appt will about 40 minutes. Call the appointment line for your clinic to schedule. Discontinue antihistamines 7 days prior to the appointment.     Venom Testing: Appt will last 2-3 hours. Please call the Allergy RN line for your clinic to schedule. Discontinue antihistamines 7 days prior to the appointment.     Thank you for trusting us with your Allergy, Asthma, and Immunology care. Please feel free to contact us with any questions or concerns you may have.

## 2020-09-23 NOTE — PROGRESS NOTES
Marie Yeboah is a 30 year old White female with previous medical history significant for allergic rhinitis. Marie Yeboah is being seen today for evaluation of seasonal allergies and anaphylaxis. The patient is being seen in consultation at the request of David Rooney NP.     Patient has a history of spring and fall rhinorrhea, congestion and sneezing.  Typically Zyrtec is 100% beneficial for the symptoms.  No history of allergy testing.  Last Sunday around 11:30 in the morning the patient ate soup.  Around 11:45 AM she developed ocular itching and swelling.  She took Zyrtec and diphenhydramine.  Subsequently developed tightness in her throat and felt like difficulty swallowing.  She had itching of her hands and throat itching.  She additionally had throat clearing.  She went to the ER.  Within 10 to 15 minutes she had hives all over her body.  She was treated with epinephrine and significantly beneficial.  For lunch that day she had a chicken dumpling soup and 2 bites of her child's chicken and rice soup.  The soups had the following ingredients wheat, egg, tumeric, chicken, rice, soy, carrot, celery, corn, pea, milk, garlic, yeast.  Subsequently she has tolerated wheat, egg, chicken, rice and milk without symptoms.  She was not prescribed a epinephrine autoinjector.  She has no prior history of symptoms with raw fruits or vegetables.  She did not take NSAIDs.  No insect stings.  No other medications or supplements were consumed.  She was not involved with any physical activity.  No other clear etiology for her symptoms.  I reviewed ER note.      ENVIRONMENTAL HISTORY: [place] is [place] with a gas furnace. They do have central air conditioning. The patient's bedroom is furnished with feather/wool bedding or pillows and carpeting in bedroom.  Pets inside the house include 2 cat(s) and 1 dog(s). There is no history of cockroach or mice infestation. There  is/are 0 smokers in the house.  The house does not have a damp basement.     Past Medical History:   Diagnosis Date     Anxiety      Depressive disorder 2007     HTN (hypertension)      Family History   Problem Relation Age of Onset     Hypertension Mother      Seizure Disorder Mother      Unknown/Adopted Mother      Hypertension Father      Heart Disease Father      Hyperlipidemia Father      Anxiety Disorder Father      Brain Tumor Paternal Grandfather      Prostate Cancer Paternal Grandfather      Past Surgical History:   Procedure Laterality Date     C/SECTION, LOW TRANSVERSE      2015, 2016     HC REMOVAL OF TONSILS,<11 Y/O         REVIEW OF SYSTEMS:  General: negative for weight gain. negative for weight loss. negative for changes in sleep.   Ears: negative for fullness. negative for hearing loss. negative for dizziness.   Nose: negative for snoring.negative for changes in smell. positive  for drainage.   Eyes: positive  for eye watering. positive  for eye itching. negative for vision changes. negative for eye redness.  Throat: negative for hoarseness. negative for sore throat. negative for trouble swallowing.   Lungs: negative for shortness of breath.negative for wheezing. negative for sputum production.   Cardiovascular: negative for chest pain. negative for swelling of ankles. negative for fast or irregular heartbeat.   Gastrointestinal: negative for nausea. positive  for heartburn. negative for acid reflux.   Musculoskeletal: negative for joint pain. negative for joint stiffness. negative for joint swelling.   Neurologic: negative for seizures. negative for fainting. negative for weakness.   Psychiatric: negative for changes in mood. positive  for anxiety.   Endocrine: negative for cold intolerance. negative for heat intolerance. negative for tremors.   Lymphatic: negative for lower extremity swelling. negative for lymph node swelling.   Hematologic: negative for easy bruising. negative for easy  bleeding.  Integumentary: negative for rash. negative for scaling. negative for nail changes.       Current Outpatient Medications:      cetirizine (ZYRTEC) 10 MG tablet, Take 10 mg by mouth daily, Disp: , Rfl:      diphenhydrAMINE (BENADRYL) 25 MG tablet, Take 25 mg by mouth every 6 hours as needed for itching or allergies, Disp: , Rfl:      EPINEPHrine (AUVI-Q) 0.3 MG/0.3ML injection 2-pack, Inject 0.3 mLs (0.3 mg) into the muscle as needed for anaphylaxis, Disp: 2 each, Rfl: 1     hydrochlorothiazide (HYDRODIURIL) 25 MG tablet, Take 1 tablet (25 mg) by mouth daily, Disp: 90 tablet, Rfl: 3     levonorgestrel (MIRENA) 20 MCG/24HR IUD, 1 each by Intrauterine route once, Disp: , Rfl:      escitalopram (LEXAPRO) 10 MG tablet, Take 1 tablet (10 mg) by mouth daily, Disp: 90 tablet, Rfl: 0  Immunization History   Administered Date(s) Administered     HPV Quadrivalent 12/19/2008, 02/26/2009, 07/02/2009     Hep B, Peds or Adolescent 08/20/2002, 10/12/2015, 11/15/2015, 12/13/2016     HepA, Unspecified 11/02/2011     HepB 08/20/2002     Hib (PRP-T) 1991, 1991, 1991, 09/18/1992     Historical DTP/aP 1991, 1991, 1991, 04/21/1993, 10/10/1995     Influenza Vaccine IM > 6 months Valent IIV4 11/02/2011, 09/18/2014, 10/12/2015, 10/12/2016, 10/08/2019     MMR 09/18/1992, 08/20/2002     OPV, trivalent, live 1991, 1991, 04/21/1993, 10/10/1995     TDAP Vaccine (Adacel) 04/03/2015, 10/12/2016     Allergies   Allergen Reactions     Contrast Dye Anaphylaxis     Bee Venom      Other reaction(s): Hives  Other reaction(s): Hives         EXAM:   Constitutional:  Appears well-developed and well-nourished. No distress.   HEENT:   Head: Normocephalic.   Nasal tissue pink and normal appearing.  No rhinorrhea noted.    Eyes: Conjunctivae are non-erythematous   Cardiovascular: Normal rate, regular rhythm and normal heart sounds. Exam reveals no gallop and no friction rub.   No murmur  heard.  Respiratory: Effort normal and breath sounds normal. No respiratory distress. No wheezes. No rales.   Musculoskeletal: Normal range of motion.   Neuro: Oriented to person, place, and time.  Skin: Skin is warm and dry. No rash noted.   Psychiatric: Normal mood and affect.     Nursing note and vitals reviewed.    ASSESSMENT/PLAN:  Problem List Items Addressed This Visit        Circulatory    Hypertension, goal below 140/90     Marie to follow up with Primary Care provider regarding elevated blood pressure.              Immune    Anaphylaxis     Unclear etiology of anaphylaxis.  Suspect food given timing of eating and developing symptoms 15 minutes later.  Have to consider food pollen syndrome given food she contained had raw fruits and vegetables and she has a history of pollen allergies.    The soups had the following ingredients wheat, egg, tumeric, chicken, rice, soy, carrot, celery, corn, pea, milk, garlic, yeast.  Subsequently she has tolerated wheat, egg, chicken, rice and milk without symptoms.      -Could not do skin testing today secondary to antihistamine use.  Blood testing as noted.  -Continue to avoid foods we are testing for.  - An anaphylaxis action plan was given and reviewed with patient and family.   - Injectable epinephrine use was reviewed and demonstrated. The patient will need to carry injectable epinephrine.   - Injectable epinephrine script provided.   - Serum IgE for birch.            Relevant Medications    EPINEPHrine (AUVI-Q) 0.3 MG/0.3ML injection 2-pack    Other Relevant Orders    Allergen soybean IgE (Completed)    Allergen Carrot IgE (Completed)    Allergen Celery IgE (Completed)    Allergen silver  birch IgE (Completed)    Allergen corn IgE (Completed)    Allergen pea IgE (Completed)    Allergen Garlic IgE (Completed)    IgE for tumeric: Laboratory Miscellaneous Order (Completed)    [facility] Miscellaneous Test (Completed)       Infectious/Inflammatory    Rhinoconjunctivitis -  Primary     Spring and fall nasal and ocular symptoms.  Controlled on Zyrtec.    -Zyrtec daily as needed.  - Serum IgE for birch.               Chart documentation with Dragon Voice recognition Software. Although reviewed after completion, some words and grammatical errors may remain.    DO DARRIN GrossI  Medical Director for Allergy/Immunology at [place], [place]

## 2020-09-23 NOTE — LETTER
JONELLE                   FOOD ALLERGY & ANAPHYLAXIS EMERGENCY CARE PLAN  Food [employer] Research [employer] Education         Name: Marie CAST Obinna    :  [id number]    Allergy to: Unknown (avoiding tumeric, soy, carrot, celery, corn, pea and garlic  Weight: 250 lbs 0 oz lbs.  Asthma:  No    -NOTE: Do not depend on antihistamines or inhalers (bronchodilators) to treat a severe reaction. USE EPINEPHRINE.     MEDICATIONS/DOSES  Epinephrine Brand: Auvi Q  Epinephrine Dose: 0.3 mg IM  Antihistamine Brand or Generic: Zyrtec (Cetirizine)  Antihistamine Dose: 10mg         FARE                   FOOD ALLERGY & ANAPHYLAXIS EMERGENCY CARE PLAN   Food Allergy Research [employer] Education           EMERGENCY CONTACTS - CALL 911  DOCTOR:  Judd Carrasco DO   PHONE: [phone]  PARENT/GUARDIAN:              PHONE:  OTHER EMERGENCY CONTACTS  NAME/RELATIONSHIP:   PHONE:   NAME/RELATIONSHIP:    PHONE:           PARENT/GUARDIAN AUTHORIZATION SIGNATURE     DATE              PHYSICIAN/H CP AUTHORIZATION SIGNATURE         DATE  FORM PROVIDED COURTESY OF FOOD ALLERGY RESEARCH & EDUCATION (FARE) ([email or web address]) 2014

## 2020-09-23 NOTE — LETTER
9/23/2020         RE: Marie Yeboah  [address] [facility] [address]        Dear Colleague,    Thank you for referring your patient, Marie Yeboah, to the [facility]. Please see a copy of my visit note below.    Marie Yeboah is a 30 year old White female with previous medical history significant for allergic rhinitis. Marie Yeboah is being seen today for evaluation of seasonal allergies and anaphylaxis. The patient is being seen in consultation at the request of David Rooney NP.     Patient has a history of spring and fall rhinorrhea, congestion and sneezing.  Typically Zyrtec is 100% beneficial for the symptoms.  No history of allergy testing.  Last Sunday around 11:30 in the morning the patient ate soup.  Around 11:45 AM she developed ocular itching and swelling.  She took Zyrtec and diphenhydramine.  Subsequently developed tightness in her throat and felt like difficulty swallowing.  She had itching of her hands and throat itching.  She additionally had throat clearing.  She went to the ER.  Within 10 to 15 minutes she had hives all over her body.  She was treated with epinephrine and significantly beneficial.  For lunch that day she had a chicken dumpling soup and 2 bites of her child's chicken and rice soup.  The soups had the following ingredients wheat, egg, tumeric, chicken, rice, soy, carrot, celery, corn, pea, milk, garlic, yeast.  Subsequently she has tolerated wheat, egg, chicken, rice and milk without symptoms.  She was not prescribed a epinephrine autoinjector.  She has no prior history of symptoms with raw fruits or vegetables.  She did not take NSAIDs.  No insect stings.  No other medications or supplements were consumed.  She was not involved with any physical activity.  No other clear etiology for her symptoms.  I reviewed ER note.      ENVIRONMENTAL HISTORY: [place] home is heated with a gas furnace. They do have central  air conditioning. The patient's bedroom is furnished with feather/wool bedding or pillows and carpeting in bedroom.  Pets inside the house include 2 cat(s) and 1 dog(s). There is no history of cockroach or mice infestation. There is/are 0 smokers in the house.  The house does not have a damp basement.     Past Medical History:   Diagnosis Date     Anxiety      Depressive disorder 2007     HTN (hypertension)      Family History   Problem Relation Age of Onset     Hypertension Mother      Seizure Disorder Mother      Unknown/Adopted Mother      Hypertension Father      Heart Disease Father      Hyperlipidemia Father      Anxiety Disorder Father      Brain Tumor Paternal Grandfather      Prostate Cancer Paternal Grandfather      Past Surgical History:   Procedure Laterality Date     C/SECTION, LOW TRANSVERSE      2015, 2016     HC REMOVAL OF TONSILS,<13 Y/O         REVIEW OF SYSTEMS:  General: negative for weight gain. negative for weight loss. negative for changes in sleep.   Ears: negative for fullness. negative for hearing loss. negative for dizziness.   Nose: negative for snoring.negative for changes in smell. positive  for drainage.   Eyes: positive  for eye watering. positive  for eye itching. negative for vision changes. negative for eye redness.  Throat: negative for hoarseness. negative for sore throat. negative for trouble swallowing.   Lungs: negative for shortness of breath.negative for wheezing. negative for sputum production.   Cardiovascular: negative for chest pain. negative for swelling of ankles. negative for fast or irregular heartbeat.   Gastrointestinal: negative for nausea. positive  for heartburn. negative for acid reflux.   Musculoskeletal: negative for joint pain. negative for joint stiffness. negative for joint swelling.   Neurologic: negative for seizures. negative for fainting. negative for weakness.   Psychiatric: negative for changes in mood. positive  for anxiety.   Endocrine: negative  for cold intolerance. negative for heat intolerance. negative for tremors.   Lymphatic: negative for lower extremity swelling. negative for lymph node swelling.   Hematologic: negative for easy bruising. negative for easy bleeding.  Integumentary: negative for rash. negative for scaling. negative for nail changes.       Current Outpatient Medications:      cetirizine (ZYRTEC) 10 MG tablet, Take 10 mg by mouth daily, Disp: , Rfl:      diphenhydrAMINE (BENADRYL) 25 MG tablet, Take 25 mg by mouth every 6 hours as needed for itching or allergies, Disp: , Rfl:      EPINEPHrine (AUVI-Q) 0.3 MG/0.3ML injection 2-pack, Inject 0.3 mLs (0.3 mg) into the muscle as needed for anaphylaxis, Disp: 2 each, Rfl: 1     hydrochlorothiazide (HYDRODIURIL) 25 MG tablet, Take 1 tablet (25 mg) by mouth daily, Disp: 90 tablet, Rfl: 3     levonorgestrel (MIRENA) 20 MCG/24HR IUD, 1 each by Intrauterine route once, Disp: , Rfl:      escitalopram (LEXAPRO) 10 MG tablet, Take 1 tablet (10 mg) by mouth daily, Disp: 90 tablet, Rfl: 0  Immunization History   Administered Date(s) Administered     HPV Quadrivalent 12/19/2008, 02/26/2009, 07/02/2009     Hep B, Peds or Adolescent 08/20/2002, 10/12/2015, 11/15/2015, 12/13/2016     HepA, Unspecified 11/02/2011     HepB 08/20/2002     Hib (PRP-T) 1991, 1991, 1991, 09/18/1992     Historical DTP/aP 1991, 1991, 1991, 04/21/1993, 10/10/1995     Influenza Vaccine IM > 6 months Valent IIV4 11/02/2011, 09/18/2014, 10/12/2015, 10/12/2016, 10/08/2019     MMR 09/18/1992, 08/20/2002     OPV, trivalent, live 1991, 1991, 04/21/1993, 10/10/1995     TDAP Vaccine (Adacel) 04/03/2015, 10/12/2016     Allergies   Allergen Reactions     Contrast Dye Anaphylaxis     Bee Venom      Other reaction(s): Hives  Other reaction(s): Hives         EXAM:   Constitutional:  Appears well-developed and well-nourished. No distress.   HEENT:   Head: Normocephalic.   Nasal tissue pink and  normal appearing.  No rhinorrhea noted.    Eyes: Conjunctivae are non-erythematous   Cardiovascular: Normal rate, regular rhythm and normal heart sounds. Exam reveals no gallop and no friction rub.   No murmur heard.  Respiratory: Effort normal and breath sounds normal. No respiratory distress. No wheezes. No rales.   Musculoskeletal: Normal range of motion.   Neuro: Oriented to person, place, and time.  Skin: Skin is warm and dry. No rash noted.   Psychiatric: Normal mood and affect.     Nursing note and vitals reviewed.    ASSESSMENT/PLAN:  Problem List Items Addressed This Visit        Circulatory    Hypertension, goal below 140/90     Marie to follow up with Primary Care provider regarding elevated blood pressure.              Immune    Anaphylaxis     Unclear etiology of anaphylaxis.  Suspect food given timing of eating and developing symptoms 15 minutes later.  Have to consider food pollen syndrome given food she contained had raw fruits and vegetables and she has a history of pollen allergies.    The soups had the following ingredients wheat, egg, tumeric, chicken, rice, soy, carrot, celery, corn, pea, milk, garlic, yeast.  Subsequently she has tolerated wheat, egg, chicken, rice and milk without symptoms.      -Could not do skin testing today secondary to antihistamine use.  Blood testing as noted.  -Continue to avoid foods we are testing for.  - An anaphylaxis action plan was given and reviewed with patient and family.   - Injectable epinephrine use was reviewed and demonstrated. The patient will need to carry injectable epinephrine.   - Injectable epinephrine script provided.   - Serum IgE for birch.            Relevant Medications    EPINEPHrine (AUVI-Q) 0.3 MG/0.3ML injection 2-pack    Other Relevant Orders    Allergen soybean IgE (Completed)    Allergen Carrot IgE (Completed)    Allergen Celery IgE (Completed)    Allergen silver  birch IgE (Completed)    Allergen corn IgE (Completed)    Allergen pea  IgE (Completed)    Allergen Garlic IgE (Completed)    IgE for tumeric: Laboratory Miscellaneous Order (Completed)    [facility] Miscellaneous Test (Completed)       Infectious/Inflammatory    Rhinoconjunctivitis - Primary     Spring and fall nasal and ocular symptoms.  Controlled on Zyrtec.    -Zyrtec daily as needed.  - Serum IgE for birch.               Chart documentation with Dragon Voice recognition Software. Although reviewed after completion, some words and grammatical errors may remain.    DO RAJINDER GrossAAI  Medical Director for Allergy/Immunology at [place], [place]      Again, thank you for allowing me to participate in the care of your patient.        Sincerely,        Judd Carrasco DO

## 2020-09-23 NOTE — ASSESSMENT & PLAN NOTE
Unclear etiology of anaphylaxis.  Suspect food given timing of eating and developing symptoms 15 minutes later.  Have to consider food pollen syndrome given food she contained had raw fruits and vegetables and she has a history of pollen allergies.    The soups had the following ingredients wheat, egg, tumeric, chicken, rice, soy, carrot, celery, corn, pea, milk, garlic, yeast.  Subsequently she has tolerated wheat, egg, chicken, rice and milk without symptoms.      -Could not do skin testing today secondary to antihistamine use.  Blood testing as noted.  -Continue to avoid foods we are testing for.  - An anaphylaxis action plan was given and reviewed with patient and family.   - Injectable epinephrine use was reviewed and demonstrated. The patient will need to carry injectable epinephrine.   - Injectable epinephrine script provided.   - Serum IgE for birch.

## 2020-09-23 NOTE — ASSESSMENT & PLAN NOTE
Spring and fall nasal and ocular symptoms.  Controlled on Zyrtec.    -Zyrtec daily as needed.  - Serum IgE for birch.

## 2020-09-24 LAB
CARROT IGE QN: <0.1 KU(A)/L
CELERY IGE QN: <0.1 KU(A)/L
CORN IGE QN: <0.1 KU(A)/L
GARLIC IGE QN: <0.1 KU(A)/L
PEA IGE QN: <0.1 KU(A)/L
SILVER BIRCH IGE QN: <0.1 KU(A)/L
SOYBEAN IGE QN: <0.1 KU(A)/L

## 2020-09-24 NOTE — RESULT ENCOUNTER NOTE
Allergy blood testing negative thus far for pea, soy, carrot, garlic, corn, celery and birch. Tumeric is pending. Lets have you return for skin testing for pea, soy, garlic, carrot and celery. Please bring with you carrot, celery, garlic and pea. We have soy. Avoid these foods otherwise. Still unclear cause of reaction at this point. I will have staff call to get you scheduled. Thanks.     Dr. Carrasco

## 2020-10-06 ENCOUNTER — OFFICE VISIT (OUTPATIENT)
Dept: ALLERGY | Facility: OTHER | Age: 30
End: 2020-10-06
Payer: COMMERCIAL

## 2020-10-06 VITALS — BODY MASS INDEX: 39.24 KG/M2 | OXYGEN SATURATION: 97 % | HEART RATE: 84 BPM | HEIGHT: 67 IN | WEIGHT: 250 LBS

## 2020-10-06 DIAGNOSIS — T78.2XXD ANAPHYLAXIS, SUBSEQUENT ENCOUNTER: ICD-10-CM

## 2020-10-06 PROCEDURE — 99207 PR NO CHARGE LOS: CPT | Performed by: ALLERGY & IMMUNOLOGY

## 2020-10-06 PROCEDURE — 95004 PERQ TESTS W/ALRGNC XTRCS: CPT | Performed by: ALLERGY & IMMUNOLOGY

## 2020-10-06 ASSESSMENT — MIFFLIN-ST. JEOR: SCORE: 1891.62

## 2020-10-06 NOTE — PATIENT INSTRUCTIONS
Allergy Staff Appt Hours Shot Hours Locations    Physician     Judd Carrasco DO       Support Staff     TRICIA Junior, [place]  Tuesday:        [place] 7-4:20     Wednesday:        [place]: 7-5     Thursday:                    [place] 7-6:40     Friday:  [place]  7-2:40   [place]        Thursday: 1-5:50        Friday: 7-10:50     [place]        Tuesday: 7- 3:20        Wednesday: 7-4:20     [place] Monday: 7-4:20        Tuesday: 1-6:20         [facility]  [address] [place] [address]  Appt Line: [phone]  Allergy RN:  [phone]    [facility]  [address] [place] [address]  Appt Line: [phone]  Allergy RN:  [phone]       Important Scheduling Information  Aspirin Desensitization: Appt will last 2 clinic days. Please call the Allergy RN line for your clinic to schedule. Discontinue antihistamines 7 days prior to the appointment.     Food Challenges: Appt will last 3-4 hours. Please call the Allergy RN line for your clinic to schedule. Discontinue antihistamines 7 days prior to the appointment.     Penicillin Testing: Appt will last 2-3 hours. Please call the Allergy RN line for your clinic to schedule. Discontinue antihistamines 7 days prior to the appointment.     Skin Testing: Appt will about 40 minutes. Call the appointment line for your clinic to schedule. Discontinue antihistamines 7 days prior to the appointment.     Venom Testing: Appt will last 2-3 hours. Please call the Allergy RN line for your clinic to schedule. Discontinue antihistamines 7 days prior to the appointment.     Thank you for trusting us with your Allergy, Asthma, and Immunology care. Please feel free to contact us with any questions or concerns you may have.

## 2020-10-06 NOTE — ASSESSMENT & PLAN NOTE
Unclear etiology of anaphylaxis.  Suspect food given timing of eating and developing symptoms 15 minutes later.  Have to consider food pollen syndrome given food she contained had raw fruits and vegetables and she has a history of pollen allergies.    Blood testing for foods was negative. Birch IgE negative.      The soups had the following ingredients wheat, egg, tumeric, chicken, rice, soy, carrot, celery, corn, pea, milk, garlic, yeast.  Subsequently she has tolerated wheat, egg, chicken, rice and milk without symptoms.       Skin testing:  Negative for soy, tumeric, pea, celery and carrot x2.     -Unclear cause of symptoms. Possible natural flavors or spices that were not further listed on labeling. Avoid the soup and soup brand potentially involved. Continue to carry injectable epinephrine. Continue to follow anaphylaxis action plan.   - Injectable epinephrine use was reviewed and demonstrated. The patient will need to carry injectable epinephrine.   - Injectable epinephrine script provided.

## 2020-10-06 NOTE — LETTER
10/6/2020         RE: Marie Yeboah  [address] [facility] [place] [address]        Dear Colleague,    Thank you for referring your patient, Marie Yeboah, to the [facility]. Please see a copy of my visit note below.    Marie Yeboah is a 30 year old White female with previous medical history significant for anaphylaxis who returns for a follow up visit.     Patient presents today for allergy skin testing. The patient is currently in a good state of health. No recent fevers, chills, cough, wheezing, shortness of breath, skin rash, angioedema, nausea, vomiting or diarrhea. The risks and benefits were discussed and the patient/patient's family wishes to proceed. The consent was signed.    Past Medical History:   Diagnosis Date     Anxiety      Depressive disorder 2007     HTN (hypertension)      Family History   Problem Relation Age of Onset     Hypertension Mother      Seizure Disorder Mother      Unknown/Adopted Mother      Hypertension Father      Heart Disease Father      Hyperlipidemia Father      Anxiety Disorder Father      Brain Tumor Paternal Grandfather      Prostate Cancer Paternal Grandfather      Past Surgical History:   Procedure Laterality Date     C/SECTION, LOW TRANSVERSE      2015, 2016     HC REMOVAL OF TONSILS,<11 Y/O         REVIEW OF SYSTEMS:  General: negative for weight gain. negative for weight loss. negative for changes in sleep.   Ears: negative for fullness. negative for hearing loss. negative for dizziness.   Nose: negative for snoring.negative for changes in smell. negative for drainage.   Eyes: negative for eye watering. negative for eye itching. negative for vision changes. negative for eye redness.  Throat: negative for hoarseness. negative for sore throat. negative for trouble swallowing.   Lungs: negative for shortness of breath.negative for wheezing. negative for sputum production.   Cardiovascular: negative for chest pain. negative for swelling of ankles.  negative for fast or irregular heartbeat.   Gastrointestinal: negative for nausea. negative for heartburn. negative for acid reflux.   Musculoskeletal: negative for joint pain. negative for joint stiffness. negative for joint swelling.   Neurologic: negative for seizures. negative for fainting. negative for weakness.   Psychiatric: negative for changes in mood. negative for anxiety.   Endocrine: negative for cold intolerance. negative for heat intolerance. negative for tremors.   Lymphatic: negative for lower extremity swelling. negative for lymph node swelling.   Hematologic: negative for easy bruising. negative for easy bleeding.  Integumentary: negative for rash. negative for scaling. negative for nail changes.       Current Outpatient Medications:      cetirizine (ZYRTEC) 10 MG tablet, Take 10 mg by mouth daily, Disp: , Rfl:      diphenhydrAMINE (BENADRYL) 25 MG tablet, Take 25 mg by mouth every 6 hours as needed for itching or allergies, Disp: , Rfl:      EPINEPHrine (AUVI-Q) 0.3 MG/0.3ML injection 2-pack, Inject 0.3 mLs (0.3 mg) into the muscle as needed for anaphylaxis, Disp: 2 each, Rfl: 1     hydrochlorothiazide (HYDRODIURIL) 25 MG tablet, Take 1 tablet (25 mg) by mouth daily, Disp: 90 tablet, Rfl: 3     levonorgestrel (MIRENA) 20 MCG/24HR IUD, 1 each by Intrauterine route once, Disp: , Rfl:      escitalopram (LEXAPRO) 10 MG tablet, Take 1 tablet (10 mg) by mouth daily, Disp: 90 tablet, Rfl: 0  Immunization History   Administered Date(s) Administered     HPV Quadrivalent 12/19/2008, 02/26/2009, 07/02/2009     Hep B, Peds or Adolescent 08/20/2002, 10/12/2015, 11/15/2015, 12/13/2016     HepA, Unspecified 11/02/2011     HepB 08/20/2002     Hib (PRP-T) 1991, 1991, 1991, 09/18/1992     Historical DTP/aP 1991, 1991, 1991, 04/21/1993, 10/10/1995     Influenza Vaccine IM > 6 months Valent IIV4 11/02/2011, 09/18/2014, 10/12/2015, 10/12/2016, 10/08/2019     MMR 09/18/1992,  08/20/2002     OPV, trivalent, live 1991, 1991, 04/21/1993, 10/10/1995     TDAP Vaccine (Adacel) 04/03/2015, 10/12/2016     Allergies   Allergen Reactions     Contrast Dye Anaphylaxis     Bee Venom      Other reaction(s): Hives  Other reaction(s): Hives           WORKUP:  FOOD ALLERGEN PERCUTANEOUS SKIN TESTING  [employer]  10/6/2020   Consent Y   Ordering Physician Danilo   Interpreting Physician Danilo   Testing Technician Hillary Lainez Arm   Time start:  9:05 AM   Time End:  9:20 AM   Positive Control: Histatrol*ALK 1 mg/ml 5/5   Negative Control: 50% Glycerin**[place] Jatinder 0   Soy 1:40 w/v 0   Coconut 1:20 (W/F in millimeters) -   Sesame Seed  1:20 (W/F in millimeters) -   Wheat 1:20 (W/F in millimeters) -   Corn 1:40 (W/F in millimeters) -   Rice 1:20 (W/F in millimeters) -   Rye 1:20 (W/F in millimeters) -   Barley 1:20 (W/F in millimeters) -   Oat 1:20 (W/F in millimeters) -   Yeast AllerMed 1:10 w/v -   Other Food(s) carrot x2   Reaction (W/F in millimeters) (0,0)   Other Food(s) celery x2   Reaction (W/F in millimeters) (0,0)   Other Food(s) pea x2   Reaction (W/F in millimeters) (0,0)   Other Food(s) turmeric x2   Reaction (W/F in millimeters) (0,0)        ASSESSMENT/PLAN:  Problem List Items Addressed This Visit        Immune    Anaphylaxis     Unclear etiology of anaphylaxis.  Suspect food given timing of eating and developing symptoms 15 minutes later.  Have to consider food pollen syndrome given food she contained had raw fruits and vegetables and she has a history of pollen allergies.    Blood testing for foods was negative. Birch IgE negative.      The soups had the following ingredients wheat, egg, tumeric, chicken, rice, soy, carrot, celery, corn, pea, milk, garlic, yeast.  Subsequently she has tolerated wheat, egg, chicken, rice and milk without symptoms.       Skin testing:  Negative for soy, tumeric, pea, celery and carrot x2.     -Unclear cause of symptoms. Possible natural  flavors or spices that were not further listed on labeling. Avoid the soup and soup brand potentially involved. Continue to carry injectable epinephrine. Continue to follow anaphylaxis action plan.   - Injectable epinephrine use was reviewed and demonstrated. The patient will need to carry injectable epinephrine.   - Injectable epinephrine script provided.          Relevant Orders    ALLERGY SKIN TESTS,ALLERGENS [[id number]] (Completed)          Chart documentation with Dragon Voice recognition Software. Although reviewed after completion, some words and grammatical errors may remain.    Judd Carrasco DO FAAAAI  Medical Director for Allergy/Immunology at [place], [place]        Again, thank you for allowing me to participate in the care of your patient.        Sincerely,        Judd Carrasco DO

## 2020-10-06 NOTE — PROGRESS NOTES
Marie Yeboah is a 30 year old White female with previous medical history significant for anaphylaxis who returns for a follow up visit.     Patient presents today for allergy skin testing. The patient is currently in a good state of health. No recent fevers, chills, cough, wheezing, shortness of breath, skin rash, angioedema, nausea, vomiting or diarrhea. The risks and benefits were discussed and the patient/patient's family wishes to proceed. The consent was signed.    Past Medical History:   Diagnosis Date     Anxiety      Depressive disorder 2007     HTN (hypertension)      Family History   Problem Relation Age of Onset     Hypertension Mother      Seizure Disorder Mother      Unknown/Adopted Mother      Hypertension Father      Heart Disease Father      Hyperlipidemia Father      Anxiety Disorder Father      Brain Tumor Paternal Grandfather      Prostate Cancer Paternal Grandfather      Past Surgical History:   Procedure Laterality Date     C/SECTION, LOW TRANSVERSE      2015, 2016     HC REMOVAL OF TONSILS,<13 Y/O         REVIEW OF SYSTEMS:  General: negative for weight gain. negative for weight loss. negative for changes in sleep.   Ears: negative for fullness. negative for hearing loss. negative for dizziness.   Nose: negative for snoring.negative for changes in smell. negative for drainage.   Eyes: negative for eye watering. negative for eye itching. negative for vision changes. negative for eye redness.  Throat: negative for hoarseness. negative for sore throat. negative for trouble swallowing.   Lungs: negative for shortness of breath.negative for wheezing. negative for sputum production.   Cardiovascular: negative for chest pain. negative for swelling of ankles. negative for fast or irregular heartbeat.   Gastrointestinal: negative for nausea. negative for heartburn. negative for acid reflux.   Musculoskeletal: negative for joint pain. negative for joint stiffness. negative for joint swelling.    Neurologic: negative for seizures. negative for fainting. negative for weakness.   Psychiatric: negative for changes in mood. negative for anxiety.   Endocrine: negative for cold intolerance. negative for heat intolerance. negative for tremors.   Lymphatic: negative for lower extremity swelling. negative for lymph node swelling.   Hematologic: negative for easy bruising. negative for easy bleeding.  Integumentary: negative for rash. negative for scaling. negative for nail changes.       Current Outpatient Medications:      cetirizine (ZYRTEC) 10 MG tablet, Take 10 mg by mouth daily, Disp: , Rfl:      diphenhydrAMINE (BENADRYL) 25 MG tablet, Take 25 mg by mouth every 6 hours as needed for itching or allergies, Disp: , Rfl:      EPINEPHrine (AUVI-Q) 0.3 MG/0.3ML injection 2-pack, Inject 0.3 mLs (0.3 mg) into the muscle as needed for anaphylaxis, Disp: 2 each, Rfl: 1     hydrochlorothiazide (HYDRODIURIL) 25 MG tablet, Take 1 tablet (25 mg) by mouth daily, Disp: 90 tablet, Rfl: 3     levonorgestrel (MIRENA) 20 MCG/24HR IUD, 1 each by Intrauterine route once, Disp: , Rfl:      escitalopram (LEXAPRO) 10 MG tablet, Take 1 tablet (10 mg) by mouth daily, Disp: 90 tablet, Rfl: 0  Immunization History   Administered Date(s) Administered     HPV Quadrivalent 12/19/2008, 02/26/2009, 07/02/2009     Hep B, Peds or Adolescent 08/20/2002, 10/12/2015, 11/15/2015, 12/13/2016     HepA, Unspecified 11/02/2011     HepB 08/20/2002     Hib (PRP-T) 1991, 1991, 1991, 09/18/1992     Historical DTP/aP 1991, 1991, 1991, 04/21/1993, 10/10/1995     Influenza Vaccine IM > 6 months Valent IIV4 11/02/2011, 09/18/2014, 10/12/2015, 10/12/2016, 10/08/2019     MMR 09/18/1992, 08/20/2002     OPV, trivalent, live 1991, 1991, 04/21/1993, 10/10/1995     TDAP Vaccine (Adacel) 04/03/2015, 10/12/2016     Allergies   Allergen Reactions     Contrast Dye Anaphylaxis     Bee Venom      Other reaction(s):  Hives  Other reaction(s): Hives           WORKUP:  FOOD ALLERGEN PERCUTANEOUS SKIN TESTING  [employer]  10/6/2020   Consent Y   Ordering Physician Danilo   Interpreting Physician Danilo   Testing Technician Hillary Lainez Arm   Time start:  9:05 AM   Time End:  9:20 AM   Positive Control: Histatrol*ALK 1 mg/ml 5/5   Negative Control: 50% Glycerin**[place] Jatinder 0   Soy 1:40 w/v 0   Coconut 1:20 (W/F in millimeters) -   Sesame Seed  1:20 (W/F in millimeters) -   Wheat 1:20 (W/F in millimeters) -   Corn 1:40 (W/F in millimeters) -   Rice 1:20 (W/F in millimeters) -   Rye 1:20 (W/F in millimeters) -   Barley 1:20 (W/F in millimeters) -   Oat 1:20 (W/F in millimeters) -   Yeast AllerMed 1:10 w/v -   Other Food(s) carrot x2   Reaction (W/F in millimeters) (0,0)   Other Food(s) celery x2   Reaction (W/F in millimeters) (0,0)   Other Food(s) pea x2   Reaction (W/F in millimeters) (0,0)   Other Food(s) turmeric x2   Reaction (W/F in millimeters) (0,0)        ASSESSMENT/PLAN:  Problem List Items Addressed This Visit        Immune    Anaphylaxis     Unclear etiology of anaphylaxis.  Suspect food given timing of eating and developing symptoms 15 minutes later.  Have to consider food pollen syndrome given food she contained had raw fruits and vegetables and she has a history of pollen allergies.    Blood testing for foods was negative. Birch IgE negative.      The soups had the following ingredients wheat, egg, tumeric, chicken, rice, soy, carrot, celery, corn, pea, milk, garlic, yeast.  Subsequently she has tolerated wheat, egg, chicken, rice and milk without symptoms.       Skin testing:  Negative for soy, tumeric, pea, celery and carrot x2.     -Unclear cause of symptoms. Possible natural flavors or spices that were not further listed on labeling. Avoid the soup and soup brand potentially involved. Continue to carry injectable epinephrine. Continue to follow anaphylaxis action plan.   - Injectable epinephrine use was  reviewed and demonstrated. The patient will need to carry injectable epinephrine.   - Injectable epinephrine script provided.          Relevant Orders    ALLERGY SKIN TESTS,ALLERGENS [[id number]] (Completed)          Chart documentation with Dragon Voice recognition Software. Although reviewed after completion, some words and grammatical errors may remain.    Judd Carrasco DO FAAAAI  Medical Director for Allergy/Immunology at [place], [place]

## 2020-10-08 LAB — LAB SCANNED RESULT: NORMAL

## 2020-10-14 ENCOUNTER — OFFICE VISIT (OUTPATIENT)
Dept: FAMILY MEDICINE | Facility: CLINIC | Age: 30
End: 2020-10-14
Payer: COMMERCIAL

## 2020-10-14 VITALS
SYSTOLIC BLOOD PRESSURE: 138 MMHG | WEIGHT: 250.06 LBS | DIASTOLIC BLOOD PRESSURE: 98 MMHG | BODY MASS INDEX: 39.17 KG/M2 | TEMPERATURE: 97.8 F | RESPIRATION RATE: 18 BRPM | OXYGEN SATURATION: 99 % | HEART RATE: 108 BPM

## 2020-10-14 DIAGNOSIS — F41.9 ANXIETY: ICD-10-CM

## 2020-10-14 DIAGNOSIS — I10 HYPERTENSION, GOAL BELOW 140/90: Primary | ICD-10-CM

## 2020-10-14 PROCEDURE — 99213 OFFICE O/P EST LOW 20 MIN: CPT | Performed by: NURSE PRACTITIONER

## 2020-10-14 RX ORDER — LOSARTAN POTASSIUM 25 MG/1
25 TABLET ORAL DAILY
Qty: 30 TABLET | Refills: 3 | Status: SHIPPED | OUTPATIENT
Start: 2020-10-14 | End: 2021-03-12

## 2020-10-14 RX ORDER — SERTRALINE HYDROCHLORIDE 25 MG/1
25 TABLET, FILM COATED ORAL DAILY
Qty: 30 TABLET | Refills: 3 | Status: SHIPPED | OUTPATIENT
Start: 2020-10-14 | End: 2021-04-05

## 2020-10-14 NOTE — PROGRESS NOTES
Subjective     Marie Yeboah is a 30 year old female who presents to clinic today for the following health issues:    Patient presents today for follow up of her blood pressure medication. She has been struggling with leg cramps with the Hydrodiuril and the blood pressures are still elevated on some days, she is wondering if there is another option. She stopped her Lexapro as well, the 20 mg was not tolerated and when she dropped down to the 10 mg tablets her mood was not improved. Would like to try another medication for the anxiety as well.  No other new acute symptoms of concerns.     HPI       Think we need to switch her blood pressure medication.     Hypertension Follow-up      Do you check your blood pressure regularly outside of the clinic? No     Are you following a low salt diet? Yes    Are your blood pressures ever more than 140 on the top number (systolic) OR more   than 90 on the bottom number (diastolic), for example 140/90? Yes      How many servings of fruits and vegetables do you eat daily?  2-3    On average, how many sweetened beverages do you drink each day (Examples: soda, juice, sweet tea, etc.  Do NOT count diet or artificially sweetened beverages)?   1    How many days per week do you exercise enough to make your heart beat faster? 3 or less    How many minutes a day do you exercise enough to make your heart beat faster? 30 - 60    How many days per week do you miss taking your medication? 0        Review of Systems   Constitutional, HEENT, cardiovascular, pulmonary, gi and gu systems are negative, except as otherwise noted.      Objective    BP (!) 138/98   Pulse 108   Temp 97.8  F (36.6  C) (Temporal)   Resp 18   Wt 113.4 kg (250 lb 1 oz)   LMP 09/23/2020   SpO2 99%   Breastfeeding No   BMI 39.17 kg/m    Body mass index is 39.17 kg/m .  Physical Exam   GENERAL: healthy, alert and no distress  EYES: Eyes grossly normal to inspection, PERRL and conjunctivae and sclerae normal  RESP:  Normal Effort  CV: regular rates and rhythm and no peripheral edema  MS: no gross musculoskeletal defects noted, no edema  NEURO: Normal strength and tone, mentation intact and speech normal  PSYCH: mentation appears normal, affect normal/bright and judgement and insight intact            Assessment & Plan     Hypertension, goal below 140/90  - We will have her stop the Hydrodiuril now.   - Will try the Cozaar and follow up with her in 2 weeks.   - losartan (COZAAR) 25 MG tablet; Take 1 tablet (25 mg) by mouth daily    Anxiety  - We will have her taper up on the Zoloft over 2 weeks.  - Discussed medication side effects and when to call clinic with worsening anxiety.   - sertraline (ZOLOFT) 25 MG tablet; Take 1 tablet (25 mg) by mouth daily    - See AVS    - Patient verbalized understanding of plan of care and is in agreement with current plan of care.      Return in about 2 weeks (around 10/28/2020), or B/P and Zoloft., for BP Recheck.    David Rooney NP  [facility]

## 2020-10-14 NOTE — PATIENT INSTRUCTIONS
- We will stop the hydrochlorothiazide due to the cramps.     - We will start Losartan 25 mg daily. Let me know if you have side effects or if the blood pressure does not drop into goal range.     - Anxiety and Depression: Zoloft 25 mg daily. Take 1/2 tablet for 2 weeks then go up to full tablet.

## 2020-10-23 ENCOUNTER — TELEPHONE (OUTPATIENT)
Dept: FAMILY MEDICINE | Facility: CLINIC | Age: 30
End: 2020-10-23

## 2020-10-23 NOTE — TELEPHONE ENCOUNTER
This is great news. She should continue with low salt diet and current dosing.     David Rooney CNP

## 2020-10-27 ENCOUNTER — OFFICE VISIT (OUTPATIENT)
Dept: OBGYN | Facility: CLINIC | Age: 30
End: 2020-10-27
Payer: COMMERCIAL

## 2020-10-27 VITALS
BODY MASS INDEX: 39.08 KG/M2 | HEART RATE: 85 BPM | TEMPERATURE: 98.1 F | DIASTOLIC BLOOD PRESSURE: 100 MMHG | WEIGHT: 249.5 LBS | SYSTOLIC BLOOD PRESSURE: 140 MMHG

## 2020-10-27 DIAGNOSIS — Z12.4 SCREENING FOR CERVICAL CANCER: ICD-10-CM

## 2020-10-27 DIAGNOSIS — Z31.69 ENCOUNTER FOR PRECONCEPTION CONSULTATION: Primary | ICD-10-CM

## 2020-10-27 DIAGNOSIS — Z30.432 ENCOUNTER FOR REMOVAL OF INTRAUTERINE CONTRACEPTIVE DEVICE: ICD-10-CM

## 2020-10-27 PROCEDURE — 58301 REMOVE INTRAUTERINE DEVICE: CPT | Performed by: OBSTETRICS & GYNECOLOGY

## 2020-10-27 PROCEDURE — 99214 OFFICE O/P EST MOD 30 MIN: CPT | Mod: 25 | Performed by: OBSTETRICS & GYNECOLOGY

## 2020-10-27 PROCEDURE — G0145 SCR C/V CYTO,THINLAYER,RESCR: HCPCS | Performed by: OBSTETRICS & GYNECOLOGY

## 2020-10-27 NOTE — PROGRESS NOTES
Clinic Note    CC:    Chief Complaint   Patient presents with     IUD     possible removal      HPI:  Marie Yeboah is a 30 year old  woman who presents with unscheduled bleeding for the past month. Mirena placed 3/19, previously happy and without symptoms. Had a ParaGard in the past, did not like the side effects. Contraindicated from estrogen given HTN. She reports periods are normal at baseline. Uncertain if desires other contraception vs fertility vs sterilization.   She is also due for her PAP today. No other symptoms nor concerns.     Ob Hx:  s/p CS x2  Gyn Hx: Patient's last menstrual period was 2020 (approximate).      PMH:   Past Medical History:   Diagnosis Date     Anxiety      Depressive disorder      HTN (hypertension)      SurgHx:   Past Surgical History:   Procedure Laterality Date     C/SECTION, LOW TRANSVERSE      ,      HC REMOVAL OF TONSILS,<11 Y/O       FamHx:   Family History   Problem Relation Age of Onset     Hypertension Mother      Seizure Disorder Mother      Unknown/Adopted Mother      Hypertension Father      Heart Disease Father      Hyperlipidemia Father      Anxiety Disorder Father      Brain Tumor Paternal Grandfather      Prostate Cancer Paternal Grandfather      SocHx:   Social History     Socioeconomic History     Marital status:      Spouse name: None     Number of children: None     Years of education: None     Highest education level: None   Occupational History     None   Social Needs     Financial resource strain: None     Food insecurity     Worry: None     Inability: None     Transportation needs     Medical: None     Non-medical: None   Tobacco Use     Smoking status: Former Smoker     Years: 5.00     Types: Cigarettes     Start date: 2007     Smokeless tobacco: Never Used   Substance and Sexual Activity     Alcohol use: Yes     Frequency: 2-4 times a month     Drug use: No     Sexual activity: Yes     Partners: Male     Birth  control/protection: I.U.D.   Lifestyle     Physical activity     Days per week: None     Minutes per session: None     Stress: None   Relationships     Social connections     Talks on phone: None     Gets together: None     Attends Mosque service: None     Active member of club or organization: None     Attends meetings of clubs or organizations: None     Relationship status: None     Intimate partner violence     Fear of current or ex partner: None     Emotionally abused: None     Physically abused: None     Forced sexual activity: None   Other Topics Concern     Parent/sibling w/ CABG, MI or angioplasty before 65F 55M? No   Social History Narrative     None     Allergies:   Contrast dye and Bee venom  Current Medications:   Current Outpatient Medications   Medication Sig Dispense Refill     cetirizine (ZYRTEC) 10 MG tablet Take 10 mg by mouth daily       diphenhydrAMINE (BENADRYL) 25 MG tablet Take 25 mg by mouth every 6 hours as needed for itching or allergies       EPINEPHrine (AUVI-Q) 0.3 MG/0.3ML injection 2-pack Inject 0.3 mLs (0.3 mg) into the muscle as needed for anaphylaxis 2 each 1     levonorgestrel (MIRENA) 20 MCG/24HR IUD 1 each by Intrauterine route once       losartan (COZAAR) 25 MG tablet Take 1 tablet (25 mg) by mouth daily 30 tablet 3     sertraline (ZOLOFT) 25 MG tablet Take 1 tablet (25 mg) by mouth daily 30 tablet 3     escitalopram (LEXAPRO) 10 MG tablet Take 1 tablet (10 mg) by mouth daily 90 tablet 0       ROS: 10 point ROS negative other than as noted in the HPI    EXAM:  Vitals:    10/27/20 1143   BP: (!) 140/100   BP Location: Right arm   Patient Position: Chair   Cuff Size: Adult Large   Pulse: 85   Temp: 98.1  F (36.7  C)   TempSrc: Temporal   Weight: 113.2 kg (249 lb 8 oz)    Body mass index is 39.08 kg/m .    Gen: Alert, oriented, appropriately interactive, NAD  Abdomen: soft, non tender, non distended, no masses  Perineum: no lesions; normal appearing external genitalia,  "bartholins glands, urethra, skenes glands  Vagina: no masses or lesions or discharge, normally rugated.  Cervix: no masses or lesions or discharge   Anus: appears normal  Bimanual exam:   Nontender pelvic floor muscles  Uterus: midline, anteverted, small, mobile  no masses, non-tender  Adnexa: no masses or tenderness appreciated   No cervical motion tenderness  MSK: normal gait, symmetric movements UE & LE  Lower extremities: non-tender, no edema    Labs & Imaging:  POC TVUS with grossly normal uterus, IUD correctly positioned in the cavity    ASSESSMENT/PLAN: Marie Yeboah is a 30 year old  woman who presents for unscheduled bleeding as well as PAP.   Reviewed options for Depo vs Nexplanon including side effects. She is contraindicated from estrogen. Also reviewed option for sterilization vs hysterectomy given has not tolerated menstrual suppression. She would like the IUD removed today, will discuss fertility with partner, states that \"its now or never.\" to return for contraception vs surgery discussion vs new OB pending outcome.     Encounter for preconception consultation  - Reviewed recommendations for avoiding exposures  - Prenatal Vit-Fe Fumarate-FA (PNV FOLIC ACID + IRON) 27-1 MG TABS; Take 1 tablet by mouth daily  Dispense: 90 tablet; Refill: 3    Screening for cervical cancer  - Normal pelvic exam today  - Pap imaged thin layer screen reflex to HPV if ASCUS - recommend age 25 - 29    Encounter for removal of intrauterine contraceptive device  - REMOVAL NON-BIODEGRADABLE DRUG DELIVERY IMPLANT  - REMOVE INTRAUTERINE DEVICE    Mauricio Barbosa MD   10/27/2020 11:51 AM        IUD Removal:  SUBJECTIVE:    Was a consent obtained?  Yes    Marie Yeboah is a 30 year old female,, Patient's last menstrual period was 2020 (approximate). who presents today for IUD removal. Her current IUD was placed 3/19. She has been overall happy with the device until unscheduled bleeding occurred over the " past month, requests removal.     Today's PHQ-2 Score:   PHQ-2 ( 1999 Pf[employer]) 5/14/2020   Q1: Little interest or pleasure in doing things 0   Q2: Feeling down, depressed or hopeless 1   PHQ-2 Score 1   Q1: Little interest or pleasure in doing things -   Q2: Feeling down, depressed or hopeless -   PHQ-2 Score -       PROCEDURE:  A speculum exam was performed and the cervix was visualized. The IUD string was visualized. Using ring forceps, the string  was grasped and the IUD removed intact.    POST PROCEDURE:    The patient tolerated the procedure well. Patient was discharged in stable condition.    Call if bleeding, pain or fever occur., Birth control counseling given. and Pregnancy counseling given, including folic acid supplementation 800-1000 mg per day.    Mauricio Barbosa MD  October 27, 2020 11:16 PM

## 2020-10-29 LAB
COPATH REPORT: NORMAL
PAP: NORMAL

## 2020-11-04 ENCOUNTER — TRANSFERRED RECORDS (OUTPATIENT)
Dept: HEALTH INFORMATION MANAGEMENT | Facility: CLINIC | Age: 30
End: 2020-11-04

## 2020-11-12 ENCOUNTER — E-VISIT (OUTPATIENT)
Dept: URGENT CARE | Facility: CLINIC | Age: 30
End: 2020-11-12
Payer: COMMERCIAL

## 2020-11-12 DIAGNOSIS — Z20.822 SUSPECTED COVID-19 VIRUS INFECTION: Primary | ICD-10-CM

## 2020-11-12 PROCEDURE — 99421 OL DIG E/M SVC 5-10 MIN: CPT | Performed by: FAMILY MEDICINE

## 2020-11-12 NOTE — PATIENT INSTRUCTIONS
"  Dear Marie Yeboah,    Your symptoms show that you may have coronavirus (COVID-19). This illness can cause fever, cough and trouble breathing. Many people get a mild case and get better on their own. Some people can get very sick.    Will I be tested for COVID-19?  We would like to test you for this virus. I have placed an order for this test and please call [phone] to schedule testing. [employer] employees please call [phone]. [place] employees call [phone].     When it's time for your COVID test:  Stay at least 6 feet away from others. (If someone will drive you to your test, stay in the backseat, as far away from the  as you can.)  Cover your mouth and nose with a mask, tissue or washcloth.  Go straight to the testing site. Don't make any stops on the way there or back.    Starting now:     Do not go to work.   o If you receive a negative COVID-19 test result and were NOT exposed to someone with a known positive COVID-19 test, you can return to work once you're free of fever for 24 hours without fever-reducing medication and your symptoms are improving or resolved.  o If you receive a positive COVID-19 test result, you must be cleared by Employee Occupational Health and Safety to return to work.   o If you were exposed to someone who has tested positive for COVID-19, you can return to work 14 days after your last contact with the positive individual, provided you do not have symptoms at all during that time. In some cases, your manager may ask you to come back sooner than 14 days.     During this time, don't leave the house except for testing or medical care.  o Stay in your own room, even for meals. Use your own bathroom if you can.  o Stay away from others in your home. No hugging, kissing or shaking hands. No visitors.  o Don't go to work, school or anywhere else.    Clean \"high touch\" surfaces often (doorknobs, counters, handles, etc.). Use a household cleaning spray or " wipes. You'll find a full list of  on the EPA website: www.epa.gov/pesticide-registration/list-n-disinfectants-use-against-sars-cov-2.    Cover your mouth and nose with a mask, tissue or washcloth to avoid spreading germs.    Wash your hands and face often. Use soap and water.    People in these groups are at risk for severe illness due to COVID-19:  o People 65 years and older  o People who live in a nursing home or long-term care facility  o People with chronic disease (lung, heart, cancer, diabetes, kidney, liver, immunologic)  o People who have a weakened immune system, including those who:  - Are in cancer treatment  - Take medicine that weakens the immune system, such as corticosteroids  - Had a bone marrow or organ transplant  - Have an immune deficiency  - Have poorly controlled HIV or AIDS  - Are obese (body mass index of 40 or higher)  - Smoke regularly      Caregivers should wear gloves while washing dishes, handling laundry and cleaning bedrooms and bathrooms.    Use caution when washing and drying laundry: Don't shake dirty laundry, and use the warmest water setting that you can.    For more tips, go to www.cdc.gov/coronavirus/2019-ncov/downloads/10Things.pdf.    Sign up for [employer]. We know it's scary to hear that you might have COVID-19. We want to track your symptoms to make sure you're okay over the next 2 weeks. Please look for an email from [employer]--this is a free, online program that we'll use to keep in touch. To sign up, follow the link in the email you will receive. Learn more at [email or web address]    How can I take care of myself?    Get lots of rest. Drink extra fluids (unless a doctor has told you not to)    Take Tylenol (acetaminophen) for fever or pain. If you have liver or kidney problems, ask your family doctor if it's okay to take Tylenol.  Adults can take either:    650 mg (two 325 mg pills) every 4 to 6 hours, or     1,000 mg (two 500 mg pills) every  8 hours as needed.    Note: Don't take more than 3,000 mg in one day. Acetaminophen is found in many medicines (both prescribed and over-the-counter medicines). Read all labels to be sure you don't take too much.  For children, check the Tylenol bottle for the right dose. The dose is based on the child's age or weight.    If you have other health problems (like cancer, heart failure, an organ transplant or severe kidney disease): Call your specialty clinic if you don't feel better in the next 2 days.    Know when to call 911. Emergency warning signs include:  Trouble breathing or shortness of breath  Pain or pressure in the chest that doesn't go away  Feeling confused like you haven't felt before, or not being able to wake up  Bluish-colored lips or face    Where can I get more information?     [employer] - About COVID-19: [email or web address]  CDC - What to Do If You're Sick: www.cdc.gov/coronavirus/2019-ncov/about/steps-when-sick.html  November 12, 2020    RE:  Marie Yeboah                                                                                                                                                       [id number] [address]        To whom it may concern:    I evaluated Marie Yeboah on 11/12/20. Marie Yeboah should be excused from work/school until test results are back .     Do not go to work.      If you receive a negative COVID-19 test result and were NOT exposed to someone with a known positive COVID-19 test, you can return to work once you're free of fever for 24 hours without fever-reducing medication and your symptoms are improving or resolved.    If you receive a positive COVID-19 test result, you must be cleared by Employee Occupational Health and Safety to return to work.     If you were exposed to someone who has tested positive for COVID-19, you can return to work 14 days after your last contact with the positive individual, provided you do not have  symptoms at all during that time. In some cases, your manager may ask you to come back sooner than 14 days.       Sincerely,  Sarah Joiner MD

## 2020-11-13 ENCOUNTER — RESULTS ONLY (OUTPATIENT)
Dept: LAB | Age: 30
End: 2020-11-13

## 2020-11-13 ENCOUNTER — APPOINTMENT (OUTPATIENT)
Dept: FAMILY MEDICINE | Facility: CLINIC | Age: 30
End: 2020-11-13
Payer: COMMERCIAL

## 2020-11-13 LAB
SARS-COV-2 RNA SPEC QL NAA+PROBE: NORMAL
SPECIMEN SOURCE: NORMAL

## 2020-11-14 LAB
LABORATORY COMMENT REPORT: NORMAL
SARS-COV-2 RNA SPEC QL NAA+PROBE: NEGATIVE
SPECIMEN SOURCE: NORMAL

## 2020-11-16 ENCOUNTER — HEALTH MAINTENANCE LETTER (OUTPATIENT)
Age: 30
End: 2020-11-16

## 2020-11-20 ENCOUNTER — TELEPHONE (OUTPATIENT)
Dept: PHYSICAL MEDICINE AND REHAB | Facility: CLINIC | Age: 30
End: 2020-11-20

## 2020-11-20 ENCOUNTER — OFFICE VISIT (OUTPATIENT)
Dept: PHYSICAL MEDICINE AND REHAB | Facility: CLINIC | Age: 30
End: 2020-11-20
Payer: COMMERCIAL

## 2020-11-20 VITALS
HEART RATE: 87 BPM | OXYGEN SATURATION: 98 % | SYSTOLIC BLOOD PRESSURE: 125 MMHG | DIASTOLIC BLOOD PRESSURE: 81 MMHG | RESPIRATION RATE: 16 BRPM

## 2020-11-20 DIAGNOSIS — R41.840 ATTENTION AND CONCENTRATION DEFICIT: ICD-10-CM

## 2020-11-20 DIAGNOSIS — G43.909 MIGRAINE WITHOUT STATUS MIGRAINOSUS, NOT INTRACTABLE, UNSPECIFIED MIGRAINE TYPE: ICD-10-CM
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
## 2020-11-20 DIAGNOSIS — F07.81 POST CONCUSSION SYNDROME: ICD-10-CM

## 2020-11-20 DIAGNOSIS — S13.4XXA CHRONIC WHIPLASH INJURY, INITIAL ENCOUNTER: ICD-10-CM

## 2020-11-20 DIAGNOSIS — G24.3 CERVICAL DYSTONIA: ICD-10-CM

## 2020-11-20 DIAGNOSIS — G47.9 SLEEP DISTURBANCE: ICD-10-CM

## 2020-11-20 PROCEDURE — 99205 OFFICE O/P NEW HI 60 MIN: CPT | Performed by: PHYSICAL MEDICINE & REHABILITATION

## 2020-11-20 RX ORDER — PROPRANOLOL HYDROCHLORIDE 60 MG/1
1 TABLET ORAL DAILY
Qty: 30 TABLET | Refills: 1 | Status: SHIPPED | OUTPATIENT
Start: 2020-11-20 | End: 2021-04-05

## 2020-11-20 RX ORDER — BUTALBITAL, ACETAMINOPHEN AND CAFFEINE 50; 325; 40 MG/1; MG/1; MG/1
1 TABLET ORAL DAILY PRN
Qty: 14 TABLET | Refills: 1 | Status: SHIPPED | OUTPATIENT
Start: 2020-11-20 | End: 2020-12-04

## 2020-11-20 RX ORDER — TRAZODONE HYDROCHLORIDE 50 MG/1
50 TABLET, FILM COATED ORAL
Qty: 30 TABLET | Refills: 1 | Status: SHIPPED | OUTPATIENT
Start: 2020-11-20 | End: 2021-04-05

## 2020-11-20 ASSESSMENT — PAIN SCALES - GENERAL: PAINLEVEL: SEVERE PAIN (7)

## 2020-11-20 NOTE — PATIENT INSTRUCTIONS
"    GENERAL ADVICE:  ~ Gradually ease back into your usual activities.   ~ Rest as needed to help your symptoms go away.  - Consider pairing 50 minutes of activity with 10 minutes of rest.  ~ Allow yourself more time for activities.  ~ Write things down.  At home, at work, whenever there is something that you should remember, even it is simple.  SCREENS:  ~ Change settings on your phone and computer using the \"Blue Light Filter\" (Night Shift on all  Apple products)  ~ The goal is making screens more yellow and less blue.    ~ If this is not an option you can download this program, [employer], to adjust your screen resolution.  ~ Cool Containers for various filter and font apps  ~ Turn screen brightness down  ~ Increase font size  ~ Limit screen activities including computer, TV and phones.  NECK PAIN:  ~ Ice or Heat are good~  ~ Massage is ok if it doesn't trigger more symptoms~  ~ Gentle stretches and range-of-motion are helpful.  DIZZINESS:  ~ No driving when dizzy.  ~ No biking, climbing heights or ladders if dizzy.  FATIGUE:  ~ Daily exercise is strongly encouraged.  Start with a 10 min walk and increase the time as tolerated until you are walking 30 minutes per day.    ~ Focus on Good sleep hygiene instead of napping . Your goal should be 8 hours of sleep every night.  ~ Try Melatonin 1 hour before bed  ANXIETY OR MOOD SWINGS:  ~ If you are irritable or anxious, take a break in a quiet room.  ~ Try using the free Calm willow for guided breathing and mindfulness/meditation.  ~ Explore Tempronics (https://www.headspace.com) for free and easy-to-use meditation guidance.  LIGHT SENSITIVITIES:  ~ Avoid florescent lighting when possible.  ~Yellow or nba tinted lenses may help reduce computer or night-time road glare.             ~ Amazon has a $10.00 option: [employer] yellow Night Vision.  NOISE SENSITIVITIES:  ~ Try listening to calming sounds such as the \"Calm Willow\" to help shift your focus off of more irritating " sounds.  ~ Avoid crowded areas at first then slowly introduce yourself to small increments of crowded, noisy areas.  ~ Try High fidelity earplugs used by [employer]. Etymotic ETY-Plugs, can be found on Amazon for $13.00.  DIET:  - In principle incorporate more protein, lots of veggies, some fruit, whole grains.    - Less sweets and saturated fat.   - Mediterranean Diet is an easy-to-follow example.  ~ Drink plenty of water throughout the day (8-10 glasses per day)    PM&R / [facility]   Nurse Line # [phone]   Fax # [phone]  Scheduling; # [phone]      Thank you for allowing us to be a part of your care.
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
## 2020-11-20 NOTE — TELEPHONE ENCOUNTER
Notified patient that the Prior auth for Botox was approved and a appointment was arranged. Contact information provided
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
## 2020-12-08 ENCOUNTER — APPOINTMENT (OUTPATIENT)
Dept: LAB | Facility: CLINIC | Age: 30
End: 2020-12-08
Payer: COMMERCIAL

## 2020-12-08 DIAGNOSIS — N92.6 MISSED PERIOD: Primary | ICD-10-CM

## 2020-12-08 LAB — HCG SERPL QL: NEGATIVE

## 2020-12-08 PROCEDURE — 84703 CHORIONIC GONADOTROPIN ASSAY: CPT | Performed by: PHYSICIAN ASSISTANT

## 2020-12-08 NOTE — PROGRESS NOTES
Mirena removed 10/27/2020  Very faint positive pregnancy test at home yesterday.    Jacey Burks PA-C  [facility]

## 2021-02-03 DIAGNOSIS — F51.02 ADJUSTMENT INSOMNIA: Primary | ICD-10-CM

## 2021-02-03 RX ORDER — ZOLPIDEM TARTRATE 10 MG/1
10 TABLET ORAL
Qty: 30 TABLET | Refills: 0 | Status: SHIPPED | OUTPATIENT
Start: 2021-02-03 | End: 2021-03-05

## 2021-02-03 NOTE — PROGRESS NOTES
Struggling with insomnia since losing her dog, the adjustment has been difficult as he helped almost as a support animal for her.     Will give her a short course of Ambien to help with the adjustment period, she is aware this is not a long term solution and  I will not refill this regularly.     David Rooney CNP

## 2021-02-09 ENCOUNTER — IMMUNIZATION (OUTPATIENT)
Dept: FAMILY MEDICINE | Facility: CLINIC | Age: 31
End: 2021-02-09
Payer: COMMERCIAL

## 2021-02-09 PROCEDURE — 91300 PR COVID VAC PFIZER DIL RECON 30 MCG/0.3 ML IM: CPT

## 2021-02-09 PROCEDURE — 0001A PR COVID VAC PFIZER DIL RECON 30 MCG/0.3 ML IM: CPT

## 2021-02-24 ENCOUNTER — ANCILLARY PROCEDURE (OUTPATIENT)
Dept: GENERAL RADIOLOGY | Facility: CLINIC | Age: 31
End: 2021-02-24
Attending: PHYSICIAN ASSISTANT
Payer: COMMERCIAL

## 2021-02-24 ENCOUNTER — OFFICE VISIT (OUTPATIENT)
Dept: ORTHOPEDICS | Facility: CLINIC | Age: 31
End: 2021-02-24
Payer: COMMERCIAL

## 2021-02-24 VITALS
WEIGHT: 248.3 LBS | DIASTOLIC BLOOD PRESSURE: 90 MMHG | HEIGHT: 67 IN | SYSTOLIC BLOOD PRESSURE: 128 MMHG | BODY MASS INDEX: 38.97 KG/M2

## 2021-02-24 DIAGNOSIS — S86.812A PATELLAR TENDON STRAIN, LEFT, INITIAL ENCOUNTER: Primary | ICD-10-CM

## 2021-02-24 DIAGNOSIS — M25.562 LEFT KNEE PAIN: ICD-10-CM

## 2021-02-24 PROCEDURE — 99203 OFFICE O/P NEW LOW 30 MIN: CPT | Performed by: PHYSICIAN ASSISTANT

## 2021-02-24 PROCEDURE — 73564 X-RAY EXAM KNEE 4 OR MORE: CPT | Mod: TC | Performed by: RADIOLOGY

## 2021-02-24 ASSESSMENT — MIFFLIN-ST. JEOR: SCORE: 1875.97

## 2021-02-24 ASSESSMENT — PAIN SCALES - GENERAL: PAINLEVEL: MODERATE PAIN (5)
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
## 2021-03-02 ENCOUNTER — IMMUNIZATION (OUTPATIENT)
Dept: FAMILY MEDICINE | Facility: CLINIC | Age: 31
End: 2021-03-02
Attending: FAMILY MEDICINE
Payer: COMMERCIAL

## 2021-03-02 PROCEDURE — 0002A PR COVID VAC PFIZER DIL RECON 30 MCG/0.3 ML IM: CPT

## 2021-03-02 PROCEDURE — 91300 PR COVID VAC PFIZER DIL RECON 30 MCG/0.3 ML IM: CPT

## 2021-03-10 ENCOUNTER — TELEPHONE (OUTPATIENT)
Dept: FAMILY MEDICINE | Facility: CLINIC | Age: 31
End: 2021-03-10

## 2021-03-10 DIAGNOSIS — I10 HYPERTENSION, GOAL BELOW 140/90: Primary | ICD-10-CM

## 2021-03-10 RX ORDER — METHYLDOPA 500 MG/1
TABLET, FILM COATED ORAL
Qty: 120 TABLET | Refills: 3 | Status: SHIPPED | OUTPATIENT
Start: 2021-03-10 | End: 2021-03-12

## 2021-03-11 ENCOUNTER — TELEPHONE (OUTPATIENT)
Dept: FAMILY MEDICINE | Facility: CLINIC | Age: 31
End: 2021-03-11

## 2021-03-11 ENCOUNTER — PRENATAL OFFICE VISIT (OUTPATIENT)
Dept: FAMILY MEDICINE | Facility: CLINIC | Age: 31
End: 2021-03-11
Payer: COMMERCIAL

## 2021-03-11 DIAGNOSIS — I10 HYPERTENSION, GOAL BELOW 140/90: Primary | ICD-10-CM

## 2021-03-11 DIAGNOSIS — O09.90 SUPERVISION OF HIGH RISK PREGNANCY, ANTEPARTUM: Primary | ICD-10-CM

## 2021-03-11 PROCEDURE — 99207 PR NO CHARGE NURSE ONLY: CPT

## 2021-03-11 NOTE — TELEPHONE ENCOUNTER
Marie is newly pregnant and just stopped her losartan, we discussed starting her on labetalol or methyldopa, will start with methyldopa 500 mg bid and may need to increase as needed for BP control.   Rhoda Redman MD

## 2021-03-12 DIAGNOSIS — O21.9 NAUSEA AND VOMITING DURING PREGNANCY: Primary | ICD-10-CM

## 2021-03-12 RX ORDER — ONDANSETRON 4 MG/1
4 TABLET, ORALLY DISINTEGRATING ORAL EVERY 6 HOURS PRN
Qty: 10 TABLET | Refills: 0 | Status: SHIPPED | OUTPATIENT
Start: 2021-03-12 | End: 2021-04-30

## 2021-03-12 RX ORDER — LANOLIN ALCOHOL/MO/W.PET/CERES
50 CREAM (GRAM) TOPICAL DAILY
Qty: 30 TABLET | Refills: 2 | Status: SHIPPED | OUTPATIENT
Start: 2021-03-12 | End: 2021-07-06

## 2021-03-12 RX ORDER — LABETALOL 100 MG/1
100 TABLET, FILM COATED ORAL 2 TIMES DAILY
Qty: 180 TABLET | Refills: 3 | Status: SHIPPED | OUTPATIENT
Start: 2021-03-12 | End: 2021-09-07

## 2021-03-12 NOTE — TELEPHONE ENCOUNTER
Discussed with patient, encourage small frequent meals, hydration, B6, unisom prn, zofran sparingly prn when all else fails.   Rhoda Redman MD

## 2021-03-22 DIAGNOSIS — R79.89 LOW SERUM PROGESTERONE: Primary | ICD-10-CM

## 2021-03-22 DIAGNOSIS — O09.90 SUPERVISION OF HIGH RISK PREGNANCY, ANTEPARTUM: ICD-10-CM

## 2021-03-22 LAB
ABO + RH BLD: NORMAL
ABO + RH BLD: NORMAL
B-HCG SERPL-ACNC: 7656 IU/L (ref 0–5)
BLD GP AB SCN SERPL QL: NORMAL
BLOOD BANK CMNT PATIENT-IMP: NORMAL
ERYTHROCYTE [DISTWIDTH] IN BLOOD BY AUTOMATED COUNT: 13.2 % (ref 10–15)
HBA1C MFR BLD: 5.4 % (ref 0–5.6)
HBV SURFACE AG SERPL QL IA: NONREACTIVE
HCT VFR BLD AUTO: 36.7 % (ref 35–47)
HCV AB SERPL QL IA: NONREACTIVE
HGB BLD-MCNC: 12.3 G/DL (ref 11.7–15.7)
HIV 1+2 AB+HIV1 P24 AG SERPL QL IA: NONREACTIVE
MCH RBC QN AUTO: 28.6 PG (ref 26.5–33)
MCHC RBC AUTO-ENTMCNC: 33.5 G/DL (ref 31.5–36.5)
MCV RBC AUTO: 85 FL (ref 78–100)
PLATELET # BLD AUTO: 234 10E9/L (ref 150–450)
PROGEST SERPL-MCNC: 10.6 NG/ML
RBC # BLD AUTO: 4.3 10E12/L (ref 3.8–5.2)
SPECIMEN EXP DATE BLD: NORMAL
T PALLIDUM AB SER QL: NONREACTIVE
WBC # BLD AUTO: 6 10E9/L (ref 4–11)

## 2021-03-22 PROCEDURE — 86780 TREPONEMA PALLIDUM: CPT | Mod: 90 | Performed by: FAMILY MEDICINE

## 2021-03-22 PROCEDURE — 99000 SPECIMEN HANDLING OFFICE-LAB: CPT | Performed by: FAMILY MEDICINE

## 2021-03-22 PROCEDURE — 86901 BLOOD TYPING SEROLOGIC RH(D): CPT | Performed by: FAMILY MEDICINE

## 2021-03-22 PROCEDURE — 36415 COLL VENOUS BLD VENIPUNCTURE: CPT | Performed by: FAMILY MEDICINE

## 2021-03-22 PROCEDURE — 86900 BLOOD TYPING SEROLOGIC ABO: CPT | Performed by: FAMILY MEDICINE

## 2021-03-22 PROCEDURE — 83036 HEMOGLOBIN GLYCOSYLATED A1C: CPT | Performed by: FAMILY MEDICINE

## 2021-03-22 PROCEDURE — 84144 ASSAY OF PROGESTERONE: CPT | Performed by: FAMILY MEDICINE

## 2021-03-22 PROCEDURE — 87389 HIV-1 AG W/HIV-1&-2 AB AG IA: CPT | Performed by: FAMILY MEDICINE

## 2021-03-22 PROCEDURE — 86803 HEPATITIS C AB TEST: CPT | Performed by: FAMILY MEDICINE

## 2021-03-22 PROCEDURE — 87086 URINE CULTURE/COLONY COUNT: CPT | Performed by: FAMILY MEDICINE

## 2021-03-22 PROCEDURE — 87340 HEPATITIS B SURFACE AG IA: CPT | Performed by: FAMILY MEDICINE

## 2021-03-22 PROCEDURE — 84702 CHORIONIC GONADOTROPIN TEST: CPT | Performed by: FAMILY MEDICINE

## 2021-03-22 PROCEDURE — 86850 RBC ANTIBODY SCREEN: CPT | Performed by: FAMILY MEDICINE

## 2021-03-22 PROCEDURE — 86762 RUBELLA ANTIBODY: CPT | Performed by: FAMILY MEDICINE

## 2021-03-22 PROCEDURE — 85027 COMPLETE CBC AUTOMATED: CPT | Performed by: FAMILY MEDICINE

## 2021-03-22 NOTE — RESULT ENCOUNTER NOTE
Marie, here are your levels. I sent over the Rx for the progesterone. I didn't have a record of what you used in the past, so this might look a little different.   Your HCG level is fine. Your other tests for infection are normal.   Rhoda Redman MD

## 2021-03-23 LAB
BACTERIA SPEC CULT: NORMAL
RUBV IGG SERPL IA-ACNC: 13 IU/ML
SPECIMEN SOURCE: NORMAL

## 2021-04-05 ENCOUNTER — PRENATAL OFFICE VISIT (OUTPATIENT)
Dept: FAMILY MEDICINE | Facility: CLINIC | Age: 31
End: 2021-04-05
Payer: COMMERCIAL

## 2021-04-05 VITALS
RESPIRATION RATE: 12 BRPM | BODY MASS INDEX: 39.43 KG/M2 | HEART RATE: 99 BPM | SYSTOLIC BLOOD PRESSURE: 136 MMHG | WEIGHT: 251.2 LBS | HEIGHT: 67 IN | DIASTOLIC BLOOD PRESSURE: 72 MMHG | TEMPERATURE: 97.2 F | OXYGEN SATURATION: 97 %

## 2021-04-05 DIAGNOSIS — Z98.891 PREVIOUS CESAREAN SECTION: ICD-10-CM

## 2021-04-05 DIAGNOSIS — K59.00 CONSTIPATION, UNSPECIFIED CONSTIPATION TYPE: ICD-10-CM

## 2021-04-05 DIAGNOSIS — O09.891 SUPERVISION OF OTHER HIGH RISK PREGNANCIES, FIRST TRIMESTER: Primary | ICD-10-CM

## 2021-04-05 DIAGNOSIS — E66.01 MORBID OBESITY (H): ICD-10-CM

## 2021-04-05 DIAGNOSIS — I10 HYPERTENSION, GOAL BELOW 140/90: ICD-10-CM

## 2021-04-05 PROCEDURE — 99207 PR FIRST OB VISIT: CPT | Performed by: FAMILY MEDICINE

## 2021-04-05 RX ORDER — DOCUSATE SODIUM 100 MG/1
100 CAPSULE, LIQUID FILLED ORAL 2 TIMES DAILY PRN
COMMUNITY
Start: 2021-04-05 | End: 2022-05-13

## 2021-04-05 ASSESSMENT — MIFFLIN-ST. JEOR: SCORE: 1892.07

## 2021-04-07 ENCOUNTER — HOSPITAL ENCOUNTER (OUTPATIENT)
Dept: ULTRASOUND IMAGING | Facility: CLINIC | Age: 31
Discharge: HOME | End: 2021-04-07
Attending: FAMILY MEDICINE | Admitting: FAMILY MEDICINE
Payer: COMMERCIAL

## 2021-04-07 DIAGNOSIS — O09.90 SUPERVISION OF HIGH RISK PREGNANCY, ANTEPARTUM: ICD-10-CM

## 2021-04-07 PROCEDURE — 76801 OB US < 14 WKS SINGLE FETUS: CPT

## 2021-04-07 NOTE — RESULT ENCOUNTER NOTE
Marie, everything looks normal for this stage in your pregnancy. Your due date matches up well enough. We can talk about your exact due date next time you're here.   Rhoda Redman MD

## 2021-04-30 DIAGNOSIS — O21.9 NAUSEA AND VOMITING DURING PREGNANCY: Primary | ICD-10-CM

## 2021-04-30 RX ORDER — ONDANSETRON 4 MG/1
4 TABLET, ORALLY DISINTEGRATING ORAL EVERY 6 HOURS PRN
Qty: 10 TABLET | Refills: 1 | Status: ON HOLD | OUTPATIENT
Start: 2021-04-30 | End: 2021-10-27

## 2021-05-05 ENCOUNTER — TRANSFERRED RECORDS (OUTPATIENT)
Dept: HEALTH INFORMATION MANAGEMENT | Facility: CLINIC | Age: 31
End: 2021-05-05

## 2021-05-05 ENCOUNTER — PRENATAL OFFICE VISIT (OUTPATIENT)
Dept: FAMILY MEDICINE | Facility: CLINIC | Age: 31
End: 2021-05-05
Payer: COMMERCIAL

## 2021-05-05 VITALS
HEART RATE: 101 BPM | RESPIRATION RATE: 14 BRPM | SYSTOLIC BLOOD PRESSURE: 136 MMHG | TEMPERATURE: 97.4 F | OXYGEN SATURATION: 98 % | WEIGHT: 250.6 LBS | DIASTOLIC BLOOD PRESSURE: 70 MMHG | BODY MASS INDEX: 39.25 KG/M2

## 2021-05-05 DIAGNOSIS — E66.01 MORBID OBESITY (H): ICD-10-CM

## 2021-05-05 DIAGNOSIS — O09.299 HISTORY OF PRE-ECLAMPSIA IN PRIOR PREGNANCY, CURRENTLY PREGNANT: ICD-10-CM

## 2021-05-05 DIAGNOSIS — O09.891 SUPERVISION OF OTHER HIGH RISK PREGNANCIES, FIRST TRIMESTER: Primary | ICD-10-CM

## 2021-05-05 DIAGNOSIS — Z98.891 PREVIOUS CESAREAN SECTION: ICD-10-CM

## 2021-05-05 DIAGNOSIS — F41.9 ANXIETY: ICD-10-CM

## 2021-05-05 DIAGNOSIS — R51.9 NONINTRACTABLE EPISODIC HEADACHE, UNSPECIFIED HEADACHE TYPE: ICD-10-CM

## 2021-05-05 DIAGNOSIS — I10 HYPERTENSION, GOAL BELOW 140/90: ICD-10-CM

## 2021-05-05 PROCEDURE — 99000 SPECIMEN HANDLING OFFICE-LAB: CPT | Performed by: FAMILY MEDICINE

## 2021-05-05 PROCEDURE — 36415 COLL VENOUS BLD VENIPUNCTURE: CPT | Performed by: FAMILY MEDICINE

## 2021-05-05 PROCEDURE — 99N1100 PR STATISTIC VERIFI PRENATAL TRISOMY 21,18,13: Mod: 90 | Performed by: FAMILY MEDICINE

## 2021-05-05 PROCEDURE — 99207 PR COMPLICATED OB VISIT: CPT | Performed by: FAMILY MEDICINE

## 2021-05-05 RX ORDER — SERTRALINE HYDROCHLORIDE 25 MG/1
25 TABLET, FILM COATED ORAL DAILY
Qty: 30 TABLET | Refills: 1 | Status: SHIPPED | OUTPATIENT
Start: 2021-05-05 | End: 2021-07-06

## 2021-05-05 RX ORDER — BUTALBITAL, ACETAMINOPHEN AND CAFFEINE 50; 325; 40 MG/1; MG/1; MG/1
1 TABLET ORAL EVERY 4 HOURS PRN
Qty: 20 TABLET | Refills: 0 | Status: SHIPPED | OUTPATIENT
Start: 2021-05-05 | End: 2021-09-07

## 2021-05-05 NOTE — PROGRESS NOTES
Marie Yeboah is a 31 year old,  female who presents alone for 1st OB visit.  Her  Spencer is not here today. She has talked with OB Ed prior to today's visit.  She is 8w0d by LMP.  She has early ultrasound scheduled in 2 days. She has the following concerns: she has HTN, is currently managed on Labetalol 100 mg twice daily.   She has nausea and vomiting daily in the morning to lunchtime. She is taking B6 and Unisom, has zofran for prn.   She has had 2 CS previously. First one she was induced due to HTN/preeclampsia and had failure to progress. Repeat she had due to HTN.   She is otherwise feeling well. No bleeding or pelvic concerns.   She does have an epi-pen on hand due to history of food allergies.     I reviewed her previous OB history as follows:  OB History    Para Term  AB Living   6 2 1 1 3 2   SAB TAB Ectopic Multiple Live Births   3 0 0 0 2      # Outcome Date GA Lbr Murali/2nd Weight Sex Delivery Anes PTL Lv   6 Current            5  16 36w5d  3.062 kg (6 lb 12 oz) M -SEC  N TAMMY      Complications: Gestational hypertension      Name: Shaheed   4 Term 04/02/15 37w0d  3.572 kg (7 lb 14 oz) M -SEC  N TAMMY      Complications: Failure to Progress in Second Stage, Preeclampsia      Name: Jhonny   3 SAB  8w0d          2 SAB  6w0d          1 SAB  6w0d             Obstetric Comments   EDC 2021 based on LMP.   to Spencer.  This will be their first delivery at [facility]   .      Past Medical History:   Diagnosis Date     Anxiety      ASCUS with positive high risk HPV cervical 2008 (age 18) per Care Everywhere. NIL paps in '16, '17 & '20     Depressive disorder      HTN (hypertension)       Patient Active Problem List    Diagnosis Date Noted     Anaphylaxis 2020     Priority: Medium     Rhinoconjunctivitis 2020     Priority: Medium     Segmental dysfunction of cervical region 2019     Priority: Medium      Segmental dysfunction of thoracic region 2019     Priority: Medium     Segmental dysfunction of lumbar region 2019     Priority: Medium     Segmental dysfunction of sacral region 2019     Priority: Medium     Sprain of ligaments of cervical spine 2019     Priority: Medium     Muscle spasm 2019     Priority: Medium     Hypertension, goal below 140/90 10/12/2018     Priority: Medium     Obesity (BMI 35.0-39.9) with comorbidity (H) 10/12/2018     Priority: Medium      O:  Vitals noted.  Patient alert, oriented, and in no acute distress.    Eyes: PERRLA, EOMI.  Ears:  Canals clear, TM's nl bilaterally.  No erythema or fluid.   Nares patent without inflammation or drainage.   Oral:  Oropharynx nl without erythema, exudate, mass or other lesions.   Neck:  Supple without lymphadenopathy, JVD or masses.  CV:  RRR without murmur.  Respiratory:  Lungs clear to auscultation bilaterally.  Breasts:  not examined today.   Abdomen:  Soft, nontender, nondistended with good bowel sounds and no masses or hepatosplenomegaly.  Uterus is not palpable is the abdomen.   Fetal heart tones were not attempted as noted in OB flowsheet.    Vaginal exam reveals normal external and internal genitalia.  Cervix is closed, long and thick.  No lesions or abnormalities seen.  Bimanual exam reveals a nontender, gravid uterus consistant with 8-10 weeks with no CMT.  No adnexal masses or tenderness.   Extremities are normal without edema or lesions.    Routine labs were previously completed. Pap not due today.  Normal in 10/20 (remote history abnormal).   A:  First OB visit  High risk, due to HTN, obesity, previous CS x 2.     P:  Discussed routine pregnancy care, schedule of visits, nutrition, exercise, travel, answered questions.    We discussed goals of treating HTN in pregnancy, and likely need to adjust medication dose.   We discussed TOLAC/ vs repeat CS. I recommend repeat CS unless she comes in with spontaneous  advanced labor, and will depend on her BP control at term. She is likely planning PPTL and would like this done at time of CS. Will plan to have a surgery consult at approximately 30 weeks.   Will have her dating ultrasound in 2 days, will review accuracy of GENTRY at that time and adjust if necessary.     Will follow up in 4 weeks or sooner with any concerns.     Rhoda Redman MD

## 2021-05-06 PROBLEM — F41.9 ANXIETY: Status: ACTIVE | Noted: 2021-05-06

## 2021-05-06 PROBLEM — Z98.891 PREVIOUS CESAREAN SECTION: Status: ACTIVE | Noted: 2021-05-06

## 2021-05-06 PROBLEM — R51.9 NONINTRACTABLE EPISODIC HEADACHE, UNSPECIFIED HEADACHE TYPE: Status: ACTIVE | Noted: 2021-05-06

## 2021-05-06 PROBLEM — O09.891 SUPERVISION OF OTHER HIGH RISK PREGNANCIES, FIRST TRIMESTER: Status: ACTIVE | Noted: 2021-05-06

## 2021-05-06 PROBLEM — O09.299 HISTORY OF PRE-ECLAMPSIA IN PRIOR PREGNANCY, CURRENTLY PREGNANT: Status: ACTIVE | Noted: 2021-05-06

## 2021-05-11 LAB — LAB SCANNED RESULT: NORMAL

## 2021-06-01 ENCOUNTER — PRENATAL OFFICE VISIT (OUTPATIENT)
Dept: FAMILY MEDICINE | Facility: CLINIC | Age: 31
End: 2021-06-01
Payer: COMMERCIAL

## 2021-06-01 VITALS
RESPIRATION RATE: 14 BRPM | DIASTOLIC BLOOD PRESSURE: 78 MMHG | SYSTOLIC BLOOD PRESSURE: 142 MMHG | BODY MASS INDEX: 39.69 KG/M2 | OXYGEN SATURATION: 98 % | WEIGHT: 253.4 LBS | HEART RATE: 112 BPM | TEMPERATURE: 97.9 F

## 2021-06-01 DIAGNOSIS — O09.892 SUPERVISION OF OTHER HIGH RISK PREGNANCIES, SECOND TRIMESTER: Primary | ICD-10-CM

## 2021-06-01 DIAGNOSIS — O09.299 HISTORY OF PRE-ECLAMPSIA IN PRIOR PREGNANCY, CURRENTLY PREGNANT: ICD-10-CM

## 2021-06-01 DIAGNOSIS — Z98.891 PREVIOUS CESAREAN SECTION: ICD-10-CM

## 2021-06-01 DIAGNOSIS — E66.01 MORBID OBESITY (H): ICD-10-CM

## 2021-06-01 DIAGNOSIS — I10 HYPERTENSION, GOAL BELOW 140/90: ICD-10-CM

## 2021-06-01 PROCEDURE — 99207 PR COMPLICATED OB VISIT: CPT | Performed by: FAMILY MEDICINE

## 2021-06-10 NOTE — PROGRESS NOTES
Doing well overall.  No bleeding, mild headaches resolve. She has not needed any butalbital except once since last visit.   BP mildly increased, still taking meds.   Not feeling fetal movement, but heard on exam.   Discussed quickening.   Will follow up in 4 weeks, sooner prn.   Rhoda Redman MD

## 2021-06-28 ENCOUNTER — HOSPITAL ENCOUNTER (OUTPATIENT)
Dept: ULTRASOUND IMAGING | Facility: CLINIC | Age: 31
Discharge: HOME | End: 2021-06-28
Attending: FAMILY MEDICINE | Admitting: FAMILY MEDICINE
Payer: COMMERCIAL

## 2021-06-28 DIAGNOSIS — O09.891 SUPERVISION OF OTHER HIGH RISK PREGNANCIES, FIRST TRIMESTER: ICD-10-CM

## 2021-06-28 PROCEDURE — 76805 OB US >/= 14 WKS SNGL FETUS: CPT

## 2021-06-30 ENCOUNTER — TRANSCRIBE ORDERS (OUTPATIENT)
Dept: MATERNAL FETAL MEDICINE | Facility: CLINIC | Age: 31
End: 2021-06-30

## 2021-06-30 ENCOUNTER — TRANSCRIBE ORDERS (OUTPATIENT)
Dept: FAMILY MEDICINE | Facility: CLINIC | Age: 31
End: 2021-06-30

## 2021-06-30 DIAGNOSIS — O43.192 MARGINAL INSERTION OF UMBILICAL CORD AFFECTING MANAGEMENT OF MOTHER IN SECOND TRIMESTER: Primary | ICD-10-CM

## 2021-06-30 DIAGNOSIS — O26.90 PREGNANCY RELATED CONDITION, ANTEPARTUM: Primary | ICD-10-CM

## 2021-06-30 DIAGNOSIS — O35.EXX0 PYELECTASIS OF FETUS ON PRENATAL ULTRASOUND: ICD-10-CM

## 2021-06-30 NOTE — RESULT ENCOUNTER NOTE
Marie, here is your full ultrasound report.   Overall the baby looks good, but there are a few things to report. The baby was moving, so couldn't see everything completely: the spine was not well seen. The baby's kidneys are on the larger side. This is sometimes normal but we want to follow to make sure the kidneys are ok later in the pregnancy. The umbilical cord comes in close to the edge of the placenta, which may not be an issue but is also something we want to follow up on later in the pregnancy to make sure it does not affect baby's growth.     Overall, I think these things should all be rechecked and may be normal. However, to get the best look at your baby I would like you to go to the [facility] MFM (maternal fetal medicine) clinic to have this repeat ultrasound so everything can be checked fully. I'm going to put in an order and I'll try to reach you tomorrow to discuss if you can get there ok.   Rhoda Redman MD

## 2021-07-06 ENCOUNTER — PRENATAL OFFICE VISIT (OUTPATIENT)
Dept: FAMILY MEDICINE | Facility: CLINIC | Age: 31
End: 2021-07-06
Payer: COMMERCIAL

## 2021-07-06 VITALS
BODY MASS INDEX: 39.69 KG/M2 | SYSTOLIC BLOOD PRESSURE: 132 MMHG | DIASTOLIC BLOOD PRESSURE: 74 MMHG | RESPIRATION RATE: 20 BRPM | WEIGHT: 253.4 LBS | TEMPERATURE: 97.7 F | HEART RATE: 82 BPM | OXYGEN SATURATION: 97 %

## 2021-07-06 DIAGNOSIS — O43.192 MARGINAL INSERTION OF UMBILICAL CORD AFFECTING MANAGEMENT OF MOTHER IN SECOND TRIMESTER: ICD-10-CM

## 2021-07-06 DIAGNOSIS — O09.299 HISTORY OF PRE-ECLAMPSIA IN PRIOR PREGNANCY, CURRENTLY PREGNANT: ICD-10-CM

## 2021-07-06 DIAGNOSIS — Z98.891 PREVIOUS CESAREAN SECTION: ICD-10-CM

## 2021-07-06 DIAGNOSIS — O35.EXX0 PYELECTASIS OF FETUS ON PRENATAL ULTRASOUND: ICD-10-CM

## 2021-07-06 DIAGNOSIS — O09.892 SUPERVISION OF OTHER HIGH RISK PREGNANCIES, SECOND TRIMESTER: Primary | ICD-10-CM

## 2021-07-06 DIAGNOSIS — E66.01 MORBID OBESITY (H): ICD-10-CM

## 2021-07-06 DIAGNOSIS — I10 HYPERTENSION, GOAL BELOW 140/90: ICD-10-CM

## 2021-07-06 DIAGNOSIS — F41.9 ANXIETY: ICD-10-CM

## 2021-07-06 PROCEDURE — 99207 PR COMPLICATED OB VISIT: CPT | Performed by: FAMILY MEDICINE

## 2021-07-06 RX ORDER — SERTRALINE HYDROCHLORIDE 25 MG/1
25 TABLET, FILM COATED ORAL DAILY
Qty: 90 TABLET | Refills: 1 | Status: SHIPPED | OUTPATIENT
Start: 2021-07-06 | End: 2021-12-14

## 2021-07-06 ASSESSMENT — PAIN SCALES - GENERAL: PAINLEVEL: NO PAIN (0)

## 2021-07-06 NOTE — NURSING NOTE
Health Maintenance Due   Topic Date Due     ANNUAL REVIEW OF HM ORDERS  Never done     ADVANCE CARE PLANNING  Never done     HEPATITIS B IMMUNIZATION (2 of 3 - 3-dose primary series) 01/10/2017     PREVENTIVE CARE VISIT  10/10/2020     MATERNAL SCREENING  Never done     OBGCT (OB)  07/26/2021     Nara ABARCA LPN

## 2021-07-07 NOTE — PROGRESS NOTES
Doing well, but was crampy on Friday. May have been a little dehydrated or overheated. Drinking water.   BPs have been normal, occasionally higher but today is normal.   She had her routine sono and poor visualization due to fetal movement. Marginal cord insertion and fetal renal pelvis dilation. Will be having level II ultrasound next week. Discussed likely follow up at 28 and 36 weeks along with BPPs due to maternal HTN.   She continues on her ASA due to history of preeclampsia as well   Refilled her zoloft, is working well.   Weight stable.   Headaches manageable, hasn't need butalbital much.   Will follow up in 4 weeks, sooner prn.   Rhoda Redman MD

## 2021-07-12 ENCOUNTER — PRE VISIT (OUTPATIENT)
Dept: MATERNAL FETAL MEDICINE | Facility: CLINIC | Age: 31
End: 2021-07-12

## 2021-07-15 ENCOUNTER — OFFICE VISIT (OUTPATIENT)
Dept: MATERNAL FETAL MEDICINE | Facility: CLINIC | Age: 31
End: 2021-07-15
Attending: FAMILY MEDICINE
Payer: COMMERCIAL

## 2021-07-15 ENCOUNTER — HOSPITAL ENCOUNTER (OUTPATIENT)
Dept: ULTRASOUND IMAGING | Facility: CLINIC | Age: 31
End: 2021-07-15
Attending: FAMILY MEDICINE
Payer: COMMERCIAL

## 2021-07-15 DIAGNOSIS — O26.90 PREGNANCY RELATED CONDITION, ANTEPARTUM: ICD-10-CM

## 2021-07-15 DIAGNOSIS — O35.EXX0 ENCOUNTER FOR REPEAT ULTRASOUND OF FETAL PYELECTASIS IN SINGLETON PREGNANCY, ANTEPARTUM: ICD-10-CM

## 2021-07-15 DIAGNOSIS — O35.EXX0 RENAL ABNORMALITY OF FETUS ON PRENATAL ULTRASOUND: Primary | ICD-10-CM

## 2021-07-15 PROCEDURE — 76811 OB US DETAILED SNGL FETUS: CPT | Mod: 26 | Performed by: OBSTETRICS & GYNECOLOGY

## 2021-07-15 PROCEDURE — 76811 OB US DETAILED SNGL FETUS: CPT

## 2021-07-15 NOTE — PROGRESS NOTES
Please see full imaging report from [employer] program under imaging tab.      Allyssa Cronin MD  Maternal Fetal Medicine

## 2021-07-16 NOTE — RESULT ENCOUNTER NOTE
Marie, your level II ultrasound looks good, as you know. The baby's kidney's are mildly swollen, but overall everything looks good. I know they are having you repeat your test at 32 weeks. I'll discuss with you in more detail at your next appointment but glad everything (including the cord/placenta) looks normal.   Rhoda Redman MD

## 2021-07-18 ENCOUNTER — HOSPITAL ENCOUNTER (OUTPATIENT)
Facility: CLINIC | Age: 31
Discharge: HOME | End: 2021-07-18
Attending: FAMILY MEDICINE | Admitting: FAMILY MEDICINE
Payer: COMMERCIAL

## 2021-07-18 ENCOUNTER — NURSE TRIAGE (OUTPATIENT)
Dept: NURSING | Facility: CLINIC | Age: 31
End: 2021-07-18

## 2021-07-18 VITALS
SYSTOLIC BLOOD PRESSURE: 123 MMHG | HEART RATE: 89 BPM | TEMPERATURE: 98.5 F | RESPIRATION RATE: 18 BRPM | DIASTOLIC BLOOD PRESSURE: 67 MMHG

## 2021-07-18 PROBLEM — R11.2 NAUSEA & VOMITING: Status: ACTIVE | Noted: 2021-07-18

## 2021-07-18 LAB
ALBUMIN UR-MCNC: 30 MG/DL
ALT SERPL W P-5'-P-CCNC: 20 U/L (ref 0–50)
ANION GAP SERPL CALCULATED.3IONS-SCNC: 8 MMOL/L (ref 3–14)
APPEARANCE UR: ABNORMAL
AST SERPL W P-5'-P-CCNC: 13 U/L (ref 0–45)
BACTERIA #/AREA URNS HPF: ABNORMAL /HPF
BILIRUB UR QL STRIP: NEGATIVE
BUN SERPL-MCNC: 8 MG/DL (ref 7–30)
CALCIUM SERPL-MCNC: 8 MG/DL (ref 8.5–10.1)
CHLORIDE BLD-SCNC: 108 MMOL/L (ref 94–109)
CO2 SERPL-SCNC: 22 MMOL/L (ref 20–32)
COLOR UR AUTO: YELLOW
CREAT SERPL-MCNC: 0.58 MG/DL (ref 0.52–1.04)
CREAT UR-MCNC: 265 MG/DL
ERYTHROCYTE [DISTWIDTH] IN BLOOD BY AUTOMATED COUNT: 13.1 % (ref 10–15)
GFR SERPL CREATININE-BSD FRML MDRD: >90 ML/MIN/1.73M2
GLUCOSE BLD-MCNC: 78 MG/DL (ref 70–99)
GLUCOSE UR STRIP-MCNC: NEGATIVE MG/DL
HCT VFR BLD AUTO: 36.3 % (ref 35–47)
HGB BLD-MCNC: 12.5 G/DL (ref 11.7–15.7)
HGB UR QL STRIP: NEGATIVE
HYALINE CASTS: 4 /LPF
KETONES UR STRIP-MCNC: 80 MG/DL
LEUKOCYTE ESTERASE UR QL STRIP: NEGATIVE
MCH RBC QN AUTO: 28.9 PG (ref 26.5–33)
MCHC RBC AUTO-ENTMCNC: 34.4 G/DL (ref 31.5–36.5)
MCV RBC AUTO: 84 FL (ref 78–100)
MUCOUS THREADS #/AREA URNS LPF: PRESENT /LPF
NITRATE UR QL: NEGATIVE
PH UR STRIP: 5 [PH] (ref 5–7)
PLATELET # BLD AUTO: 186 10E3/UL (ref 150–450)
POTASSIUM BLD-SCNC: 3.3 MMOL/L (ref 3.4–5.3)
PROT UR-MCNC: 0.34 G/L
PROT/CREAT 24H UR: 0.13 G/G CR (ref 0–0.2)
RBC # BLD AUTO: 4.33 10E6/UL (ref 3.8–5.2)
RBC URINE: 4 /HPF
RENAL TUB EPI: <1 /HPF
SODIUM SERPL-SCNC: 138 MMOL/L (ref 133–144)
SP GR UR STRIP: 1.03 (ref 1–1.03)
SQUAMOUS EPITHELIAL: 10 /HPF
UROBILINOGEN UR STRIP-MCNC: NORMAL MG/DL
WBC # BLD AUTO: 5.4 10E3/UL (ref 4–11)
WBC URINE: 6 /HPF

## 2021-07-18 PROCEDURE — 96366 THER/PROPH/DIAG IV INF ADDON: CPT

## 2021-07-18 PROCEDURE — 258N000003 HC RX IP 258 OP 636: Performed by: FAMILY MEDICINE

## 2021-07-18 PROCEDURE — 81001 URINALYSIS AUTO W/SCOPE: CPT | Performed by: FAMILY MEDICINE

## 2021-07-18 PROCEDURE — 96361 HYDRATE IV INFUSION ADD-ON: CPT

## 2021-07-18 PROCEDURE — 84450 TRANSFERASE (AST) (SGOT): CPT | Performed by: FAMILY MEDICINE

## 2021-07-18 PROCEDURE — 36415 COLL VENOUS BLD VENIPUNCTURE: CPT | Performed by: FAMILY MEDICINE

## 2021-07-18 PROCEDURE — 84460 ALANINE AMINO (ALT) (SGPT): CPT | Performed by: FAMILY MEDICINE

## 2021-07-18 PROCEDURE — 96360 HYDRATION IV INFUSION INIT: CPT

## 2021-07-18 PROCEDURE — 250N000009 HC RX 250

## 2021-07-18 PROCEDURE — G0463 HOSPITAL OUTPT CLINIC VISIT: HCPCS | Mod: 25

## 2021-07-18 PROCEDURE — 80048 BASIC METABOLIC PNL TOTAL CA: CPT | Performed by: FAMILY MEDICINE

## 2021-07-18 PROCEDURE — 96365 THER/PROPH/DIAG IV INF INIT: CPT

## 2021-07-18 PROCEDURE — 84156 ASSAY OF PROTEIN URINE: CPT | Performed by: FAMILY MEDICINE

## 2021-07-18 PROCEDURE — 85014 HEMATOCRIT: CPT | Performed by: FAMILY MEDICINE

## 2021-07-18 PROCEDURE — 96374 THER/PROPH/DIAG INJ IV PUSH: CPT

## 2021-07-18 PROCEDURE — 250N000011 HC RX IP 250 OP 636: Performed by: FAMILY MEDICINE

## 2021-07-18 PROCEDURE — G0463 HOSPITAL OUTPT CLINIC VISIT: HCPCS

## 2021-07-18 PROCEDURE — 96375 TX/PRO/DX INJ NEW DRUG ADDON: CPT

## 2021-07-18 RX ORDER — POTASSIUM CHLORIDE 7.45 MG/ML
10 INJECTION INTRAVENOUS
Status: COMPLETED | OUTPATIENT
Start: 2021-07-18 | End: 2021-07-18

## 2021-07-18 RX ORDER — ONDANSETRON 2 MG/ML
4 INJECTION INTRAMUSCULAR; INTRAVENOUS EVERY 6 HOURS PRN
Status: DISCONTINUED | OUTPATIENT
Start: 2021-07-18 | End: 2021-07-18 | Stop reason: HOSPADM

## 2021-07-18 RX ORDER — POTASSIUM CHLORIDE 29.8 MG/ML
20 INJECTION INTRAVENOUS ONCE
Status: DISCONTINUED | OUTPATIENT
Start: 2021-07-18 | End: 2021-07-18

## 2021-07-18 RX ORDER — PROCHLORPERAZINE 25 MG
25 SUPPOSITORY, RECTAL RECTAL EVERY 12 HOURS PRN
Status: DISCONTINUED | OUTPATIENT
Start: 2021-07-18 | End: 2021-07-18 | Stop reason: HOSPADM

## 2021-07-18 RX ORDER — METOCLOPRAMIDE 5 MG/1
10 TABLET ORAL EVERY 6 HOURS PRN
Status: DISCONTINUED | OUTPATIENT
Start: 2021-07-18 | End: 2021-07-18 | Stop reason: HOSPADM

## 2021-07-18 RX ORDER — METOCLOPRAMIDE HYDROCHLORIDE 5 MG/ML
10 INJECTION INTRAMUSCULAR; INTRAVENOUS EVERY 6 HOURS PRN
Status: DISCONTINUED | OUTPATIENT
Start: 2021-07-18 | End: 2021-07-18 | Stop reason: HOSPADM

## 2021-07-18 RX ORDER — ONDANSETRON 4 MG/1
4 TABLET, ORALLY DISINTEGRATING ORAL EVERY 6 HOURS PRN
Status: DISCONTINUED | OUTPATIENT
Start: 2021-07-18 | End: 2021-07-18 | Stop reason: HOSPADM

## 2021-07-18 RX ORDER — PROCHLORPERAZINE MALEATE 5 MG
10 TABLET ORAL EVERY 6 HOURS PRN
Status: DISCONTINUED | OUTPATIENT
Start: 2021-07-18 | End: 2021-07-18 | Stop reason: HOSPADM

## 2021-07-18 RX ORDER — LIDOCAINE HYDROCHLORIDE 10 MG/ML
INJECTION, SOLUTION EPIDURAL; INFILTRATION; INTRACAUDAL; PERINEURAL
Status: COMPLETED
Start: 2021-07-18 | End: 2021-07-18

## 2021-07-18 RX ADMIN — POTASSIUM CHLORIDE 10 MEQ: 7.46 INJECTION, SOLUTION INTRAVENOUS at 19:02

## 2021-07-18 RX ADMIN — ONDANSETRON 4 MG: 2 INJECTION INTRAMUSCULAR; INTRAVENOUS at 18:08

## 2021-07-18 RX ADMIN — POTASSIUM CHLORIDE 10 MEQ: 7.46 INJECTION, SOLUTION INTRAVENOUS at 19:57

## 2021-07-18 RX ADMIN — SODIUM CHLORIDE, POTASSIUM CHLORIDE, SODIUM LACTATE AND CALCIUM CHLORIDE 1000 ML: 600; 310; 30; 20 INJECTION, SOLUTION INTRAVENOUS at 16:59

## 2021-07-18 RX ADMIN — SODIUM CHLORIDE, POTASSIUM CHLORIDE, SODIUM LACTATE AND CALCIUM CHLORIDE 1000 ML: 600; 310; 30; 20 INJECTION, SOLUTION INTRAVENOUS at 18:53

## 2021-07-18 RX ADMIN — LIDOCAINE HYDROCHLORIDE 20 MG: 10 INJECTION, SOLUTION EPIDURAL; INFILTRATION; INTRACAUDAL; PERINEURAL at 17:00

## 2021-07-18 NOTE — PROGRESS NOTES
S: Triage Arrival  B: Marie is a 31 y.o.  @ 22w 6d who presents with complaint of n/v since Friday.   A: EFM applied. FHT's150 with min variability, accels present, no decels noted on strip. Contractions none. C/o stomach cramps rating at 2/10. Labs collected. LR bolus started.  R: Will notify MD to obtain further orders once labs are back.

## 2021-07-18 NOTE — TELEPHONE ENCOUNTER
"Pregnant 22 weeks and \"sick since Friday\" with diarrhera, vomiting, feels weak, dizzy, with headache.  Hasn't kept anything down since Friday evening.  Denies signs of labor.    Paged on call for Dr. Redman, who recommended patient should go to labor and delivery to be evaluated and receive fluids if necessary.    OB Triage Call      Is patient's OB/Midwife with the [facility] or [facility]? [facility]- Proceed with triage     Reason for call: Nausea, vomiting, diarrhea    Assessment: Patient denies signs of labor, but feels weak and dizzy.    Plan: Paged on-call provider who recommended going to [facility].    Patient plans to deliver at [facility])    Patient's primary OB Provider is Dr. Redman..      Is patient's primary OB from [place]? No- Patient's primary OB provider is a Physician.  Labor and delivery at [facility]) ([phone]) notified of patient's pending arrival.  Report given to Patient has already spoken to [facility] and they are aware of patient's impending arrival.  A friend called Dr. Redman while RN paged on-call provider..        22w6d    Estimated Date of Delivery: Nov 15, 2021        OB History    Para Term  AB Living   6 2 1 1 3 2   SAB TAB Ectopic Multiple Live Births   3 0 0 0 2      # Outcome Date GA Lbr Murali/2nd Weight Sex Delivery Anes PTL Lv   6 Current            5  16 36w5d  3.062 kg (6 lb 12 oz) M -SEC  N TAMMY      Complications: Gestational hypertension      Name: Shaheed   4 Term 04/02/15 37w0d  3.572 kg (7 lb 14 oz) M -SEC  N TAMMY      Complications: Failure to Progress in Second Stage, Preeclampsia      Name: Jhonny   3 2014 8w0d          2 2011 6w0d          1 2009 6w0d             Obstetric Comments   EDC 2021 based on LMP.   to Spencer.  This will be their first delivery at [facility]       No results found for: LOLIS Joseph, " "RN 07/18/21 3:59 PM  [facility] Nurse Advisor    Reason for Disposition    [1] SEVERE vomiting (e.g., 8 or more times / day) AND [2] present > 8 hours    Additional Information    Negative: Sounds like a life-threatening emergency to the triager    Negative: [1] Vomiting AND [2] contains red blood or black (\"coffee ground\") material  (Exception: few red streaks in vomit that only happened once)    Negative: [1] Insulin-dependent diabetes (Type I) AND [2] glucose > 400 mg/dl (22 mmol/l)    Negative: Recent head injury (within last 3 days)    Negative: Recent abdominal injury (within last 3 days)    Negative: Severe pain in one eye    Protocols used: PREGNANCY - MORNING SICKNESS (NAUSEA AND VOMITING OF PREGNANCY)-A-AH      "

## 2021-07-19 NOTE — PROGRESS NOTES
S:  Discharge from triage.      A: Contractions absent.  Cervical exam not done. Marie states that she feels much improved since admission and IV fluids. She has taken an italian ice, some ice chips and water without nausea/vomiting. She denies pain. She is anxious to go home. She has a current prescription for Zofran ODT at home so she does not need a refill.    R:  Written and verbal D/C instruction provided. Pt. Verbalized understanding of D/C instructions and will follow up with Юлия as previously scheduled.   Verbalizes understanding that she will call or return to the [facility] with any further questions or concerns.   Pt. D/C'd via ambulation with self    Nursing Discharge Checklist  Discharge order entered: Yes  Patient care order entered: Yes  Charges entered: Yes  IV start and stop times have been documented in Epic? Yes  NST start and stop times have been documented in Epic Doc F/S? No

## 2021-07-19 NOTE — PROGRESS NOTES
Second liter of LR infusing. zofran IV given by prior nurse with good results. Patient is ready to try an italian ice. Headache improved but mildly lingering. First potassium bump finishing now. Will update Cronin when all complete.

## 2021-07-19 NOTE — DISCHARGE INSTRUCTIONS
OB Triage Discharge Instructions    Diet:  Regular diet as tolerated, starting with clear liquids    Activity:  As tolerated    Other Special Instructions: follow up with primary provider as scheduled, sooner in [facility]thing Center as needed.    Reason for being seen in L&D: nausea, vomiting, headache    Treatment/procedures performed in L&D: fetal and uterine monitoring; labs; IV hydration, potassium replacement    Call the Birthplace at [phone] if you have:  ? 5 or more contractions in one hour  ? Leaking of fluid from your vaginal area  ? Decreased fetal movement (follow kick count instructions)  ? Bleeding from your vaginal area  ? Swelling in your face, or increased swelling in your hands or legs  ? Headaches or vision problems such as blurring or seeing spots or starts  ? Nausea or vomiting lasting for more than 24 hours  ? An increase in weight (5lbs/week)  ? Epigastric pain (sometimes confused with heartburn that does not go away or a very bad stomach ache)    If you have any questions, please follow up with your doctor.        Patient Signature: ______________________________________________________________  By signing the above I acknowledge that a nurse or my care provider has explained the instruction to me and I have had any questions regarding my care explained to me.        Discharge Nurse Signature: _______________________________ 7/18/2021  7:54 PM    Method of discharge: ambulate    Accompanied by: self  Time of discharge: 2115

## 2021-08-02 ENCOUNTER — PRENATAL OFFICE VISIT (OUTPATIENT)
Dept: FAMILY MEDICINE | Facility: CLINIC | Age: 31
End: 2021-08-02
Payer: COMMERCIAL

## 2021-08-02 VITALS
RESPIRATION RATE: 16 BRPM | BODY MASS INDEX: 39.78 KG/M2 | DIASTOLIC BLOOD PRESSURE: 72 MMHG | OXYGEN SATURATION: 98 % | HEART RATE: 110 BPM | SYSTOLIC BLOOD PRESSURE: 126 MMHG | WEIGHT: 254 LBS | TEMPERATURE: 97.5 F

## 2021-08-02 DIAGNOSIS — O09.299 HISTORY OF PRE-ECLAMPSIA IN PRIOR PREGNANCY, CURRENTLY PREGNANT: ICD-10-CM

## 2021-08-02 DIAGNOSIS — Z98.891 PREVIOUS CESAREAN SECTION: ICD-10-CM

## 2021-08-02 DIAGNOSIS — I10 HYPERTENSION, GOAL BELOW 140/90: ICD-10-CM

## 2021-08-02 DIAGNOSIS — O35.EXX0 PYELECTASIS OF FETUS ON PRENATAL ULTRASOUND: ICD-10-CM

## 2021-08-02 DIAGNOSIS — O09.892 SUPERVISION OF OTHER HIGH RISK PREGNANCIES, SECOND TRIMESTER: Primary | ICD-10-CM

## 2021-08-02 DIAGNOSIS — E66.01 MORBID OBESITY (H): ICD-10-CM

## 2021-08-02 PROBLEM — O43.192 MARGINAL INSERTION OF UMBILICAL CORD AFFECTING MANAGEMENT OF MOTHER IN SECOND TRIMESTER: Status: RESOLVED | Noted: 2021-07-06 | Resolved: 2021-08-02

## 2021-08-02 PROBLEM — M62.838 MUSCLE SPASM: Status: RESOLVED | Noted: 2019-08-02 | Resolved: 2021-08-02

## 2021-08-02 PROCEDURE — 99207 PR COMPLICATED OB VISIT: CPT | Performed by: FAMILY MEDICINE

## 2021-08-02 ASSESSMENT — PAIN SCALES - GENERAL: PAINLEVEL: NO PAIN (0)

## 2021-08-02 NOTE — PROGRESS NOTES
Doing well, feeling better. N/v/dizzyness resolved. She was in birthplace on 7/18. Feels much better.   Had level II and has mild bilateral fetal pyelectasis, has repeat ultrasound scheduled at 32 weeks.   Will also set up BPP every other week starting at 28 weeks, then every week starting at 32 weeks, then twice weekly after 36 weeks until delivery. Discussed repeat CS after 39 weeks, they request date of 11/12. Will discuss with surgeon on -call for that day. Needs CS consult around 30-32 weeks.   Will repeat growth ultrasound at 36 weeks also.   BPs have been stable around 130/80 at home.   Discussed routine labs and Tdap next visit and will repeat preeclampsia labs at that time.   Discussed warning signs for preeclampsia.  Will f/u in 4 week(s) or sooner with any concern for decreased fetal movement, vaginal bleeding, fluid leak, headache, vision change, severe nausea or vomiting, abdominal pain, increased edema or other concerns.   Rhoda Redman MD

## 2021-08-02 NOTE — NURSING NOTE
Health Maintenance Due   Topic Date Due     ANNUAL REVIEW OF HM ORDERS  Never done     ADVANCE CARE PLANNING  Never done     HEPATITIS B IMMUNIZATION (2 of 3 - 3-dose primary series) 01/10/2017     PREVENTIVE CARE VISIT  10/10/2020     MATERNAL SCREENING  Never done     OBGCT (OB)  Never done     25w0d    Edgar Villanueva, [facility]

## 2021-08-16 ENCOUNTER — OFFICE VISIT (OUTPATIENT)
Dept: FAMILY MEDICINE | Facility: CLINIC | Age: 31
End: 2021-08-16
Payer: COMMERCIAL

## 2021-08-16 VITALS
SYSTOLIC BLOOD PRESSURE: 126 MMHG | RESPIRATION RATE: 18 BRPM | DIASTOLIC BLOOD PRESSURE: 74 MMHG | WEIGHT: 256 LBS | HEIGHT: 67 IN | BODY MASS INDEX: 40.18 KG/M2 | HEART RATE: 88 BPM | OXYGEN SATURATION: 100 % | TEMPERATURE: 97.8 F

## 2021-08-16 DIAGNOSIS — Z3A.27 27 WEEKS GESTATION OF PREGNANCY: Primary | ICD-10-CM

## 2021-08-16 PROCEDURE — 99207 PR PRENATAL VISIT: CPT | Performed by: OBSTETRICS & GYNECOLOGY

## 2021-08-16 ASSESSMENT — PAIN SCALES - GENERAL: PAINLEVEL: NO PAIN (0)

## 2021-08-16 ASSESSMENT — MIFFLIN-ST. JEOR: SCORE: 1913.84

## 2021-08-16 NOTE — PROGRESS NOTES
She reports daily fetal movement and no concerns.  Prenatal flowsheet info reviewed as listed above.  BP is stable on labetalol.  She wanted to discussed the plans for repeat CS and PPTL/sterilization.  We discussed the plans for  section. We talked extensively about the risks. Risks include but are not limited to bleeding, infection, injury to bowel and bladder and other maternal organs as well as risk of injury to the fetus. If bleeding is excessive it might require transfusion or rarely a hysterectomy.. We did discuss the tubal ligation/salpingectomy  as an option at the same time and she does want that to be done.  Risks are slightly higher because she has had 2 prior  sections-especially risk of bladder injury.       Regarding the timing of her surgery, I told her she should expect to be flexible because she delivered the other 2 by CS- the first one  at 37 weeks for preeclampsia and the second 1 was delivered at 36-1/2 weeks for hypertension.  Therefore she could easily need delivery prior to the 39-week cindy.  I did read her the guidelines which state that she could electively deliver anytime after 38 weeks since  she has stable hypertension which preceded the pregnancy and is stable with medication.  She wants to hang onto 39 weeks if possible.  I advised her against going later than 39 weeks because of the risk of developing preeclampsia again but she is requesting 39+4 and I have asked her to sleep on this a couple days before making a firm decision.  I did remind her that the risks go up the longer she waits after 39 weeks.  She will continue to take the labetalol and the aspirin for prophylaxis because of the history of preeclampsia.    She has an appointment after next week with Dr. Redman and she plans to have lab testing done then.  She also has an appointment with maternal-fetal medicine at 32 weeks to recheck on the bilateral fetal renal pyelectasis.  I will see again upon  request and touch base when we get closer to the planned delivery date.    HERIBERTO Leblanc MD

## 2021-08-19 PROBLEM — Z3A.27 27 WEEKS GESTATION OF PREGNANCY: Status: ACTIVE | Noted: 2021-08-19

## 2021-08-20 ENCOUNTER — HOSPITAL ENCOUNTER (OUTPATIENT)
Facility: CLINIC | Age: 31
Discharge: HOME | End: 2021-08-20
Attending: FAMILY MEDICINE | Admitting: FAMILY MEDICINE
Payer: COMMERCIAL

## 2021-08-20 ENCOUNTER — NURSE TRIAGE (OUTPATIENT)
Dept: NURSING | Facility: CLINIC | Age: 31
End: 2021-08-20

## 2021-08-20 PROBLEM — Z36.89 ENCOUNTER FOR TRIAGE IN PREGNANT PATIENT: Status: ACTIVE | Noted: 2021-08-20

## 2021-08-20 LAB
ALBUMIN UR-MCNC: NEGATIVE MG/DL
ALT SERPL W P-5'-P-CCNC: 17 U/L (ref 0–50)
APPEARANCE UR: ABNORMAL
AST SERPL W P-5'-P-CCNC: 8 U/L (ref 0–45)
BACTERIA #/AREA URNS HPF: ABNORMAL /HPF
BILIRUB UR QL STRIP: NEGATIVE
COLOR UR AUTO: YELLOW
CREAT SERPL-MCNC: 0.56 MG/DL (ref 0.52–1.04)
ERYTHROCYTE [DISTWIDTH] IN BLOOD BY AUTOMATED COUNT: 13.2 % (ref 10–15)
GFR SERPL CREATININE-BSD FRML MDRD: >90 ML/MIN/1.73M2
GLUCOSE UR STRIP-MCNC: NEGATIVE MG/DL
HCT VFR BLD AUTO: 36.5 % (ref 35–47)
HGB BLD-MCNC: 12.6 G/DL (ref 11.7–15.7)
HGB UR QL STRIP: NEGATIVE
KETONES UR STRIP-MCNC: 5 MG/DL
LEUKOCYTE ESTERASE UR QL STRIP: ABNORMAL
MCH RBC QN AUTO: 29 PG (ref 26.5–33)
MCHC RBC AUTO-ENTMCNC: 34.5 G/DL (ref 31.5–36.5)
MCV RBC AUTO: 84 FL (ref 78–100)
MUCOUS THREADS #/AREA URNS LPF: PRESENT /LPF
NITRATE UR QL: NEGATIVE
PH UR STRIP: 5 [PH] (ref 5–7)
PLATELET # BLD AUTO: 239 10E3/UL (ref 150–450)
RBC # BLD AUTO: 4.34 10E6/UL (ref 3.8–5.2)
RBC URINE: 1 /HPF
SP GR UR STRIP: 1 (ref 1–1.03)
SQUAMOUS EPITHELIAL: 2 /HPF
UROBILINOGEN UR STRIP-MCNC: NORMAL MG/DL
WBC # BLD AUTO: 8.9 10E3/UL (ref 4–11)
WBC URINE: 8 /HPF

## 2021-08-20 PROCEDURE — 85014 HEMATOCRIT: CPT | Performed by: FAMILY MEDICINE

## 2021-08-20 PROCEDURE — 36415 COLL VENOUS BLD VENIPUNCTURE: CPT | Performed by: FAMILY MEDICINE

## 2021-08-20 PROCEDURE — 81001 URINALYSIS AUTO W/SCOPE: CPT | Performed by: FAMILY MEDICINE

## 2021-08-20 PROCEDURE — G0463 HOSPITAL OUTPT CLINIC VISIT: HCPCS

## 2021-08-20 PROCEDURE — 82565 ASSAY OF CREATININE: CPT | Performed by: FAMILY MEDICINE

## 2021-08-20 PROCEDURE — 84450 TRANSFERASE (AST) (SGOT): CPT | Performed by: FAMILY MEDICINE

## 2021-08-20 PROCEDURE — 84460 ALANINE AMINO (ALT) (SGPT): CPT | Performed by: FAMILY MEDICINE

## 2021-08-20 RX ORDER — LIDOCAINE 40 MG/G
CREAM TOPICAL
Status: DISCONTINUED | OUTPATIENT
Start: 2021-08-20 | End: 2021-08-20 | Stop reason: HOSPADM

## 2021-08-20 NOTE — TELEPHONE ENCOUNTER
OB Triage Call      Is patient's OB/Midwife with the [facility] or [facility]? [facility]- Proceed with triage     Reason for call: Poss  labor    Assessment: Abd cramping all day. Low backache. Headache with pressure eyes. Baby highly active. No fluid leakage. Pt reports history of preeclampsia.     Plan: Notify L&D where pt is to deliver.    Patient plans to deliver at [facility])    Patient's primary OB Provider is Dr Rhoda Redman.      Is patient's primary OB from [facility]? No- Patient's primary OB provider is a Physician.  Labor and delivery at [facility]) ([phone]) notified of patient's pending arrival.  Report given to Janet.        27w4d    Estimated Date of Delivery: Nov 15, 2021        OB History    Para Term  AB Living   6 2 1 1 3 2   SAB TAB Ectopic Multiple Live Births   3 0 0 0 2      # Outcome Date GA Lbr Murali/2nd Weight Sex Delivery Anes PTL Lv   6 Current            5  16 36w5d  3.062 kg (6 lb 12 oz) M -SEC  N TAMMY      Complications: Gestational hypertension      Name: Shaheed   4 Term 04/02/15 37w0d  3.572 kg (7 lb 14 oz) M -SEC  N TAMMY      Complications: Failure to Progress in Second Stage, Preeclampsia      Name: Jhonny   3 2014 8w0d          2 2011 6w0d          1 2009 6w0d             Obstetric Comments   EDC 2021 based on LMP.   to [facility].  This will be their first delivery at [facility]       No results found for: GBS       Cathy Calix RN 21 1:43 PM  [facility] Nurse Advisor    Reason for Disposition    Severe headache or headache that won't go away     Persisting headache and history of pre-eclampsia in past pregnancy.    Additional Information    Negative: Passed out (i.e., fainted, collapsed and was not responding)    Negative: Shock suspected (e.g., cold/pale/clammy skin, too weak to stand, low BP, rapid pulse)    Negative: Difficult to awaken or acting  "confused (e.g., disoriented, slurred speech)    Negative: SEVERE constant abdominal pain (e.g., excruciating) and present > 1 hour    Negative: SEVERE bleeding (e.g., continuous red blood from vagina, or large blood clots)    Negative: Umbilical cord hanging out of the vagina (shiny, white, curled appearance, \"like telephone cord\")    Negative: Uncontrollable urge to push (i.e., feels like baby is coming out now)    Negative: Can see baby    Negative: Sounds like a life-threatening emergency to the triager    Negative: Pregnant > 36 weeks (i.e., term)    Negative: MODERATE-SEVERE abdominal pain    Negative: Contractions > 10 minutes apart that persist > 24 hours, and no improvement using CARE ADVICE    Negative: Contractions < 10 minutes apart for 1 hour (i.e., 6 or more contractions an hour)    Negative: Contractions and any vaginal bleeding (including: red blood, clots, spotting, or pink/brown mucous)    Negative: Vaginal bleeding or spotting (Exception:  brief spotting after intercourse or pelvic exam)    Negative: Leakage of fluid from vagina    Negative: Baby moving less today (e.g., kick count < 5 in 1 hour or < 10 in 2 hours) and 23 or more weeks pregnant    Negative: No movement of baby for 8 hours    Protocols used: PREGNANCY - LABOR - [id number]    COVID 19 Nurse Triage Plan/Patient Instructions    Please be aware that novel coronavirus (COVID-19) may be circulating in the community. If you develop symptoms such as fever, cough, or SOB or if you have concerns about the presence of another infection including coronavirus (COVID-19), please contact your health care provider or visit [email or web address].     Disposition/Instructions    In-Person Visit with provider recommended. Reference Visit Selection Guide.    Thank you for taking steps to prevent the spread of this virus.  o Limit your contact with others.  o Wear a simple mask to cover your cough.  o Wash your hands well and " often.    Resources    [facility]: About COVID-19: [email or web address]    CDC: What to Do If You're Sick: www.cdc.gov/coronavirus/2019-ncov/about/steps-when-sick.html    CDC: Ending Home Isolation: www.cdc.gov/coronavirus/2019-ncov/hcp/disposition-in-home-patients.html     CDC: Caring for Someone: www.cdc.gov/coronavirus/2019-ncov/if-you-are-sick/care-for-someone.html     [facility]: Interim Guidance for Hospital Discharge to Home: www.[email or web address]./diseases/coronavirus/hcp/hospdischarge.pdf    [facility] clinical trials (COVID-19 research studies): [email or web address]-clinical-trials     Below are the COVID-19 hotlines at the [place] Department of Health ([facility]). Interpreters are available.   o For health questions: Call [phone] or 1-235.877.4457 (7 a.m. to 7 p.m.)  o For questions about schools and childcare: Call [phone] or 1-682.898.8245 (7 a.m. to 7 p.m.)

## 2021-08-20 NOTE — DISCHARGE INSTRUCTIONS
OB Triage Discharge Instructions    Diet:  Regular diet as tolerated    Activity:  As tolerated    Other Special Instructions:     Reason for being seen in L&D: cramping, back pain, headache    Treatment/procedures performed in L&D: monnitoring, labs    Call the [facility] at [phone] if you have:  ? 5 or more contractions in one hour  ? Leaking of fluid from your vaginal area  ? Decreased fetal movement (follow kick count instructions)  ? Bleeding from your vaginal area  ? Swelling in your face, or increased swelling in your hands or legs  ? Headaches or vision problems such as blurring or seeing spots or starts  ? Nausea or vomiting lasting for more than 24 hours  ? An increase in weight (5lbs/week)  ? Epigastric pain (sometimes confused with heartburn that does not go away or a very bad stomach ache)    If you have any questions, please follow up with your doctor.        Patient Signature: ______________________________________________________________  By signing the above I acknowledge that a nurse or my care provider has explained the instruction to me and I have had any questions regarding my care explained to me.        Discharge Nurse Signature: _______________________________ 8/20/2021  5:07 PM    Method of discharge: walking    Accompanied by: self  Time of discharge: 1710

## 2021-08-20 NOTE — PROGRESS NOTES
"S: Triage Arrival  B: Marie is a 31 y.o.  @ 27w 4d who presents with complaint of low back pain and \"menstrual like\" cramping started this morning, and headache this afternoon.   A: EFM applied. FHT's135 with moderate variability, accels present, no decels noted on strip. Contractions none on strip  R: Will notify MD to obtain further orders.UA sent    "

## 2021-08-21 VITALS
SYSTOLIC BLOOD PRESSURE: 133 MMHG | DIASTOLIC BLOOD PRESSURE: 73 MMHG | TEMPERATURE: 98.4 F | HEART RATE: 79 BPM | RESPIRATION RATE: 16 BRPM

## 2021-08-21 NOTE — PROGRESS NOTES
Late entry    S:  Discharge from triage.   A:  FHT's 145, Moderate variability, Accels present, no decels noted. Contractions one since admit.  Cervical exam not one.2nd  BP check  134/75. States headache much improved, still with mild back pain. All labs seen by Dr. Keating  R:  Written and verbal D/C instruction provided. Pt. Verbalized understanding of D/C instructions and will follow up with Dr. Redman as previously scheduled.   Verbalizes understanding that she will call or return to the [facility] with any further questions or concerns.   Pt. D/C'd via walking with self    Nursing Discharge Checklist  Discharge order entered: Yes  Patient care order entered: Yes  Charges entered: Yes  IV start and stop times have been documented in Epic? No  NST start and stop times have been documented in Epic Doc F/S? no

## 2021-08-23 ENCOUNTER — HOSPITAL ENCOUNTER (OUTPATIENT)
Dept: ULTRASOUND IMAGING | Facility: CLINIC | Age: 31
Discharge: HOME | End: 2021-08-23
Attending: FAMILY MEDICINE | Admitting: FAMILY MEDICINE
Payer: COMMERCIAL

## 2021-08-23 DIAGNOSIS — O09.892 SUPERVISION OF OTHER HIGH RISK PREGNANCIES, SECOND TRIMESTER: ICD-10-CM

## 2021-08-23 DIAGNOSIS — O09.299 HISTORY OF PRE-ECLAMPSIA IN PRIOR PREGNANCY, CURRENTLY PREGNANT: ICD-10-CM

## 2021-08-23 PROCEDURE — 76819 FETAL BIOPHYS PROFIL W/O NST: CPT

## 2021-08-24 DIAGNOSIS — Z98.891 H/O CESAREAN SECTION: Primary | ICD-10-CM

## 2021-08-27 ENCOUNTER — PRENATAL OFFICE VISIT (OUTPATIENT)
Dept: FAMILY MEDICINE | Facility: CLINIC | Age: 31
End: 2021-08-27
Payer: COMMERCIAL

## 2021-08-27 VITALS
OXYGEN SATURATION: 98 % | DIASTOLIC BLOOD PRESSURE: 78 MMHG | WEIGHT: 255.6 LBS | TEMPERATURE: 97.2 F | SYSTOLIC BLOOD PRESSURE: 136 MMHG | RESPIRATION RATE: 16 BRPM | BODY MASS INDEX: 40.03 KG/M2 | HEART RATE: 120 BPM

## 2021-08-27 DIAGNOSIS — O09.299 HISTORY OF PRE-ECLAMPSIA IN PRIOR PREGNANCY, CURRENTLY PREGNANT: ICD-10-CM

## 2021-08-27 DIAGNOSIS — Z23 NEED FOR VACCINATION: ICD-10-CM

## 2021-08-27 DIAGNOSIS — E66.01 MORBID OBESITY (H): ICD-10-CM

## 2021-08-27 DIAGNOSIS — O35.EXX0 PYELECTASIS OF FETUS ON PRENATAL ULTRASOUND: ICD-10-CM

## 2021-08-27 DIAGNOSIS — Z98.891 PREVIOUS CESAREAN SECTION: ICD-10-CM

## 2021-08-27 DIAGNOSIS — I10 HYPERTENSION, GOAL BELOW 140/90: ICD-10-CM

## 2021-08-27 DIAGNOSIS — O09.93 SUPERVISION OF HIGH RISK PREGNANCY IN THIRD TRIMESTER: Primary | ICD-10-CM

## 2021-08-27 LAB
ALBUMIN SERPL-MCNC: 2.9 G/DL (ref 3.4–5)
ALP SERPL-CCNC: 56 U/L (ref 40–150)
ALT SERPL W P-5'-P-CCNC: 15 U/L (ref 0–50)
ANION GAP SERPL CALCULATED.3IONS-SCNC: 6 MMOL/L (ref 3–14)
AST SERPL W P-5'-P-CCNC: 7 U/L (ref 0–45)
BILIRUB SERPL-MCNC: 0.2 MG/DL (ref 0.2–1.3)
BUN SERPL-MCNC: 8 MG/DL (ref 7–30)
CALCIUM SERPL-MCNC: 8.9 MG/DL (ref 8.5–10.1)
CHLORIDE BLD-SCNC: 105 MMOL/L (ref 94–109)
CO2 SERPL-SCNC: 25 MMOL/L (ref 20–32)
CREAT SERPL-MCNC: 0.57 MG/DL (ref 0.52–1.04)
CREAT UR-MCNC: 26 MG/DL
ERYTHROCYTE [DISTWIDTH] IN BLOOD BY AUTOMATED COUNT: 13.2 % (ref 10–15)
GFR SERPL CREATININE-BSD FRML MDRD: >90 ML/MIN/1.73M2
GLUCOSE 1H P 50 G GLC PO SERPL-MCNC: 146 MG/DL (ref 70–129)
GLUCOSE BLD-MCNC: 144 MG/DL (ref 70–99)
HCT VFR BLD AUTO: 36.2 % (ref 35–47)
HGB BLD-MCNC: 12.3 G/DL (ref 11.7–15.7)
MCH RBC QN AUTO: 28.6 PG (ref 26.5–33)
MCHC RBC AUTO-ENTMCNC: 34 G/DL (ref 31.5–36.5)
MCV RBC AUTO: 84 FL (ref 78–100)
PLATELET # BLD AUTO: 237 10E3/UL (ref 150–450)
POTASSIUM BLD-SCNC: 3.5 MMOL/L (ref 3.4–5.3)
PROT SERPL-MCNC: 6.9 G/DL (ref 6.8–8.8)
PROT UR-MCNC: 0.06 G/L
PROT/CREAT 24H UR: 0.23 G/G CR (ref 0–0.2)
RBC # BLD AUTO: 4.3 10E6/UL (ref 3.8–5.2)
SODIUM SERPL-SCNC: 136 MMOL/L (ref 133–144)
WBC # BLD AUTO: 8.4 10E3/UL (ref 4–11)

## 2021-08-27 PROCEDURE — 84156 ASSAY OF PROTEIN URINE: CPT | Performed by: FAMILY MEDICINE

## 2021-08-27 PROCEDURE — 85027 COMPLETE CBC AUTOMATED: CPT | Performed by: FAMILY MEDICINE

## 2021-08-27 PROCEDURE — 99207 PR COMPLICATED OB VISIT: CPT | Performed by: FAMILY MEDICINE

## 2021-08-27 PROCEDURE — 82952 GTT-ADDED SAMPLES: CPT | Performed by: FAMILY MEDICINE

## 2021-08-27 PROCEDURE — 36415 COLL VENOUS BLD VENIPUNCTURE: CPT | Performed by: FAMILY MEDICINE

## 2021-08-27 PROCEDURE — 80053 COMPREHEN METABOLIC PANEL: CPT | Performed by: FAMILY MEDICINE

## 2021-08-27 PROCEDURE — 90471 IMMUNIZATION ADMIN: CPT | Performed by: FAMILY MEDICINE

## 2021-08-27 PROCEDURE — 90715 TDAP VACCINE 7 YRS/> IM: CPT | Performed by: FAMILY MEDICINE

## 2021-08-27 PROCEDURE — 86780 TREPONEMA PALLIDUM: CPT | Performed by: FAMILY MEDICINE

## 2021-08-27 ASSESSMENT — PAIN SCALES - GENERAL: PAINLEVEL: MODERATE PAIN (5)

## 2021-08-27 NOTE — PROGRESS NOTES
Prior to immunization administration, verified patients identity using patient s name and date of birth. Please see Immunization Activity for additional information.     Screening Questionnaire for Adult Immunization    Are you sick today?   No   Do you have allergies to medications, food, a vaccine component or latex?   No   Have you ever had a serious reaction after receiving a vaccination?   No   Do you have a long-term health problem with heart, lung, kidney, or metabolic disease (e.g., diabetes), asthma, a blood disorder, no spleen, complement component deficiency, a cochlear implant, or a spinal fluid leak?  Are you on long-term aspirin therapy?   No   Do you have cancer, leukemia, HIV/AIDS, or any other immune system problem?   No   Do you have a parent, brother, or sister with an immune system problem?   No   In the past 3 months, have you taken medications that affect  your immune system, such as prednisone, other steroids, or anticancer drugs; drugs for the treatment of rheumatoid arthritis, Crohn s disease, or psoriasis; or have you had radiation treatments?   No   Have you had a seizure, or a brain or other nervous system problem?   No   During the past year, have you received a transfusion of blood or blood    products, or been given immune (gamma) globulin or antiviral drug?   No   For women: Are you pregnant or is there a chance you could become       pregnant during the next month?   No   Have you received any vaccinations in the past 4 weeks?   No     Immunization questionnaire answers were all negative.        Per orders of Dr. Redman, injection of Tdap given by Maribeth Barragan. Patient instructed to remain in clinic for 15 minutes afterwards, and to report any adverse reaction to me immediately.       Screening performed by Maribeth Barragan on 8/27/2021 at 1:41 PM.

## 2021-08-27 NOTE — NURSING NOTE
Health Maintenance Due   Topic Date Due     ANNUAL REVIEW OF HM ORDERS  Never done     ADVANCE CARE PLANNING  Never done     HEPATITIS B IMMUNIZATION (2 of 3 - 3-dose primary series) 01/10/2017     PREVENTIVE CARE VISIT  10/10/2020     MATERNAL SCREENING  Never done     OBGCT (OB)  Never done     REPEAT ANTIBODY SCREEN (OB)  08/23/2021     INFLUENZA VACCINE (1) 09/01/2021     28w4d    Edgar Villanueva, [facility]

## 2021-08-28 LAB — T PALLIDUM AB SER QL: NONREACTIVE

## 2021-09-01 PROBLEM — Z36.89 ENCOUNTER FOR TRIAGE IN PREGNANT PATIENT: Status: RESOLVED | Noted: 2021-08-20 | Resolved: 2021-09-01

## 2021-09-01 PROBLEM — Z3A.27 27 WEEKS GESTATION OF PREGNANCY: Status: RESOLVED | Noted: 2021-08-19 | Resolved: 2021-09-01

## 2021-09-01 NOTE — PROGRESS NOTES
Doing well overall.  No bleeding, no regular ctx.   She does have a headache today. /82 this am. Rates 5/10, took Esgic with some relief.   She will be getting routine labs today including GTT, RPR, will also run baseline for CBC, CMP, urine protein due to chronic HTN.   Her BP is well controlled at this time, no evidence of preeclampsia. Continue on ASA.    Tdap given.   Recent BPP  on , will repeat in 2 weeks with next visit.   Discussed  labor.   Discussed warning signs for preeclampsia.  Will f/u in 2 week(s) or sooner with any concern for decreased fetal movement, vaginal bleeding, fluid leak, headache, vision change, severe nausea or vomiting, abdominal pain, increased edema or other concerns.   Rhoda Redman MD

## 2021-09-02 ENCOUNTER — LAB (OUTPATIENT)
Dept: LAB | Facility: CLINIC | Age: 31
End: 2021-09-02
Payer: COMMERCIAL

## 2021-09-02 ENCOUNTER — TELEPHONE (OUTPATIENT)
Dept: FAMILY MEDICINE | Facility: CLINIC | Age: 31
End: 2021-09-02

## 2021-09-02 DIAGNOSIS — R73.09 ELEVATED GLUCOSE TOLERANCE TEST: ICD-10-CM

## 2021-09-02 DIAGNOSIS — R73.09 ELEVATED GLUCOSE: Primary | ICD-10-CM

## 2021-09-02 LAB
GESTATIONAL GTT 1 HR POST DOSE: 179 MG/DL (ref 60–179)
GESTATIONAL GTT 2 HR POST DOSE: 171 MG/DL (ref 60–154)
GESTATIONAL GTT 3 HR POST DOSE: 79 MG/DL (ref 60–139)
GLUCOSE P FAST SERPL-MCNC: 100 MG/DL (ref 60–94)

## 2021-09-02 PROCEDURE — 82952 GTT-ADDED SAMPLES: CPT

## 2021-09-02 PROCEDURE — 82951 GLUCOSE TOLERANCE TEST (GTT): CPT

## 2021-09-02 PROCEDURE — 36415 COLL VENOUS BLD VENIPUNCTURE: CPT

## 2021-09-07 ENCOUNTER — PRENATAL OFFICE VISIT (OUTPATIENT)
Dept: FAMILY MEDICINE | Facility: CLINIC | Age: 31
End: 2021-09-07
Payer: COMMERCIAL

## 2021-09-07 ENCOUNTER — HOSPITAL ENCOUNTER (OUTPATIENT)
Dept: ULTRASOUND IMAGING | Facility: CLINIC | Age: 31
Discharge: HOME | End: 2021-09-07
Attending: FAMILY MEDICINE | Admitting: FAMILY MEDICINE
Payer: COMMERCIAL

## 2021-09-07 VITALS
OXYGEN SATURATION: 98 % | SYSTOLIC BLOOD PRESSURE: 138 MMHG | RESPIRATION RATE: 16 BRPM | HEART RATE: 122 BPM | DIASTOLIC BLOOD PRESSURE: 96 MMHG | WEIGHT: 257.4 LBS | TEMPERATURE: 98.1 F | BODY MASS INDEX: 40.31 KG/M2

## 2021-09-07 DIAGNOSIS — R51.9 NONINTRACTABLE EPISODIC HEADACHE, UNSPECIFIED HEADACHE TYPE: ICD-10-CM

## 2021-09-07 DIAGNOSIS — O09.299 HISTORY OF PRE-ECLAMPSIA IN PRIOR PREGNANCY, CURRENTLY PREGNANT: ICD-10-CM

## 2021-09-07 DIAGNOSIS — I10 HYPERTENSION, GOAL BELOW 140/90: ICD-10-CM

## 2021-09-07 DIAGNOSIS — O24.419 GESTATIONAL DIABETES MELLITUS (GDM) IN THIRD TRIMESTER, GESTATIONAL DIABETES METHOD OF CONTROL UNSPECIFIED: ICD-10-CM

## 2021-09-07 DIAGNOSIS — O09.892 SUPERVISION OF OTHER HIGH RISK PREGNANCIES, SECOND TRIMESTER: ICD-10-CM

## 2021-09-07 DIAGNOSIS — O09.893 SUPERVISION OF OTHER HIGH RISK PREGNANCIES, THIRD TRIMESTER: Primary | ICD-10-CM

## 2021-09-07 DIAGNOSIS — Z98.891 PREVIOUS CESAREAN SECTION: ICD-10-CM

## 2021-09-07 DIAGNOSIS — E66.01 MORBID OBESITY (H): ICD-10-CM

## 2021-09-07 PROCEDURE — 99207 PR COMPLICATED OB VISIT: CPT | Performed by: FAMILY MEDICINE

## 2021-09-07 PROCEDURE — 76819 FETAL BIOPHYS PROFIL W/O NST: CPT

## 2021-09-07 RX ORDER — GLUCOSAMINE HCL/CHONDROITIN SU 500-400 MG
CAPSULE ORAL
Qty: 200 EACH | Refills: 1 | Status: SHIPPED | OUTPATIENT
Start: 2021-09-07 | End: 2022-01-21

## 2021-09-07 RX ORDER — BUTALBITAL, ACETAMINOPHEN AND CAFFEINE 50; 325; 40 MG/1; MG/1; MG/1
1 TABLET ORAL EVERY 4 HOURS PRN
Qty: 20 TABLET | Refills: 0 | Status: SHIPPED | OUTPATIENT
Start: 2021-09-07 | End: 2022-08-29

## 2021-09-07 RX ORDER — LABETALOL 100 MG/1
200 TABLET, FILM COATED ORAL 2 TIMES DAILY
Qty: 360 TABLET | Refills: 1 | Status: ON HOLD | OUTPATIENT
Start: 2021-09-07 | End: 2021-09-29

## 2021-09-07 ASSESSMENT — PAIN SCALES - GENERAL: PAINLEVEL: NO PAIN (0)

## 2021-09-07 NOTE — NURSING NOTE
Health Maintenance Due   Topic Date Due     ANNUAL REVIEW OF HM ORDERS  Never done     ADVANCE CARE PLANNING  Never done     HEPATITIS B IMMUNIZATION (2 of 3 - 3-dose primary series) 01/10/2017     PREVENTIVE CARE VISIT  10/10/2020     MATERNAL SCREENING  Never done     REPEAT ANTIBODY SCREEN (OB)  08/23/2021     INFLUENZA VACCINE (1) 09/01/2021     30w1d    Edgar Villanueva, [facility]

## 2021-09-10 ENCOUNTER — MYC MEDICAL ADVICE (OUTPATIENT)
Dept: FAMILY MEDICINE | Facility: CLINIC | Age: 31
End: 2021-09-10

## 2021-09-14 ENCOUNTER — PATIENT OUTREACH (OUTPATIENT)
Dept: EDUCATION SERVICES | Facility: OTHER | Age: 31
End: 2021-09-14
Attending: FAMILY MEDICINE
Payer: COMMERCIAL

## 2021-09-14 DIAGNOSIS — O24.419 GESTATIONAL DIABETES MELLITUS (GDM) IN THIRD TRIMESTER, GESTATIONAL DIABETES METHOD OF CONTROL UNSPECIFIED: ICD-10-CM

## 2021-09-14 PROCEDURE — G0108 DIAB MANAGE TRN  PER INDIV: HCPCS | Performed by: DIETITIAN, REGISTERED

## 2021-09-14 NOTE — PROGRESS NOTES
Diabetes Self-Management Education & Support    Telephone Visit      SUBJECTIVE/OBJECTIVE:  Presents for education related to gestational diabetes.    Accompanied by: Self  Diabetes management related comments/concerns: None is specific  Gestational weeks: 31 weeks  Hospital planned for delivery: [facility]  [facility] OB Visit Date: 09/24/21  Number of previous preganancies:  (Been pregnant total of 6 times and have 2 boys)  Had any babies over 9 lbs: No (but both came three weeks early)  Previously had Gestational Diabetes: No  Have you ever had thyroid problems or taken thyroid medication?: No  Heart disease, mitral valve prolapse or rheumatic fever?: No  Hypertension : (!) Yes  High Cholesterol: No  High Triglycerides: No  Do you use tobacco products?: No  Do you drink beer, wine or hard liquor?: No (yes, but not while pregnant)    Cultural Influences/Ethnic Background:  Not  or     Estimated Date of Delivery: Nov 15, 2021    1 hour OGTT  Lab Results   Component Value Date    GLU1 146 (H) 08/27/2021       3 hour OGTT    Fasting  No results found for: GLF    1 hour  No results found for: GL1    2 hour  No results found for: GL2    3 hour  No results found for: GL3     Date Breakfast *checking 2 hours after meal Lunch  Dinner  Bedtime    Before After Before After Before After    9/14 96 83        9/13 95 88  102  104    9/12 95 97  130* said ate out      9/11 90 87  99   91   9/10 94 82  94  103    9/9 99 92  94  91                  Fasting blood glucoses: 67% in target.  After breakfast: 100% in target.  After lunch: 80% in target.  After dinner: 100% in target.    Lifestyle and Health Behaviors:  Pre-pregnancy weight (lbs): 251 (257 lbs currently.  Says gained about 30# with last pregnancies - has been really careful with pregnancy.  Lost about 2# in beginning of pregnancy with morning sickness)  Exercise:: Currently not exercising  Barrier to exercise: None  Cultural/Zoroastrianism diet restrictions?: No (no  "- tries to watch salt intake due to BP)  Meal planning/habits: Smaller portions  How many times a week on average do you eat food made away from home (restaurant/take-out)?: 1  Meals include: Breakfast, Lunch, Dinner, Evening Snack, Morning Snack  Breakfast: usually up around 2729-5414 coffee with sugar free powder; egg sandwich  Lunch: \"whatever I feel like\" salad, tomato soup and grilled cheese  Dinner: pot roast (no potatoes), spaghetti and meatballs  Snacks: tries to not snack much, but can be hard, working from home  Beverages: Coffee, Water (Occasionally will have 1 can regular soda every 1-2 weeks)  Biggest challenges to healthy eating:  (being at home as made things a little more difficult)  Pre-denise vitamin?: Yes  Supplements?: No  Experiencing nausea?: No  Experiencing heartburn?: Yes (not often, but does get it)    Healthy Coping:  Emotional response to diabetes: Ready to learn, Acceptance  Informal Support system:: Family  Stage of change: ACTION (Actively working towards change)    Current Management:  Taking medications for gestational diabetes?: No  Difficulty affording diabetes testing supplies?: No    ASSESSMENT:  Pt is newly dx with GDM.  Pt has been testing her blood sugars already - post meal checks have been 2 hours after end of meal.  We discussed from start of meal or checking 1 hour post from start of meal.  Fasting blood sugars on edge of being in goal range - pt will continue to monitor, try adding consistent bed time snack.    INTERVENTION:  Ordered Ketone Testing strips    Educational topics covered today:  GDM diagnosis, pathophysiology, Risks and Complications of GDM, Means of controlling GDM,Blood Glucose Goals, Logging and Interpreting Glucose Results, Ketone Testing, When to Call a Diabetes Educator or OB Provider, Healthy Eating During Pregnancy, Counting Carbohydrates, Meal Planning for GDM    Educational materials provided today: (will be mailed to pt)  [facility] Understanding " Gestational Diabetes  GDM Log Book  Sharps Disposal  Care After Delivery    Pt verbalized understanding of concepts discussed and recommendations provided today.     PLAN:  Check glucose 4 times daily, before breakfast and 1 hour after each meal.     Check Ketones daily for one week, if negative, reduce testing to once a week.     Physical activity recommended: walk as able after meals especially.    Meal plan: 30 grams carbs at breakfast, 45-60 grams carbs at lunch, 45-60 grams carbs at supper, 15-30 grams of carbs at 3 snacks a day.  Follow consistent CHO meal plan, eat CHO and protein/fat at all meals/snacks.    Call/e-mail/[facility] [employer] message diabetes educator if 3 or more blood sugars are above the goal in 1 week, if ketones are positive, or with questions/concerns.    Time Spent: 40 minutes  Encounter Type: Individual    Any diabetes medication dose changes were made via the CDE Protocol and Collaborative Practice Agreement with the patient's referring provider. A copy of this encounter was shared with the provider.    Celina Oliveira RD [facility]  [phone]

## 2021-09-14 NOTE — PATIENT INSTRUCTIONS
PLAN:  Check glucose 4 times daily, before breakfast and 1 hour after each meal.     Goal:  Less than 95 in the morning; less than 140 (1) hour after start of meal; less than 120 (2) hours after start of meal    Check Ketones daily for one week, if negative, reduce testing to once a week.     Physical activity recommended (and if okay with Doctor): walk as able after meals especially - this will help lower post meal numbers, could help with fasting (if walk in the evening)    Meal plan: 30 grams carbs at breakfast, 45-60 grams carbs at lunch, 45-60 grams carbs at supper, 15-30 grams of carbs at 3 snacks a day.  Follow consistent CHO meal plan, eat CHO and protein/fat at all meals/snacks.    Calorieking.com is useful for looking up carbohydrates. You can also use [employer] stephani (not for weight loss) to track carbs and look up foods

## 2021-09-18 ENCOUNTER — HEALTH MAINTENANCE LETTER (OUTPATIENT)
Age: 31
End: 2021-09-18

## 2021-09-21 ENCOUNTER — PRENATAL OFFICE VISIT (OUTPATIENT)
Dept: FAMILY MEDICINE | Facility: CLINIC | Age: 31
End: 2021-09-21
Payer: COMMERCIAL

## 2021-09-21 VITALS
RESPIRATION RATE: 16 BRPM | WEIGHT: 258.4 LBS | DIASTOLIC BLOOD PRESSURE: 80 MMHG | TEMPERATURE: 97 F | OXYGEN SATURATION: 98 % | BODY MASS INDEX: 40.47 KG/M2 | HEART RATE: 108 BPM | SYSTOLIC BLOOD PRESSURE: 124 MMHG

## 2021-09-21 DIAGNOSIS — O09.299 HISTORY OF PRE-ECLAMPSIA IN PRIOR PREGNANCY, CURRENTLY PREGNANT: ICD-10-CM

## 2021-09-21 DIAGNOSIS — O09.893 SUPERVISION OF OTHER HIGH RISK PREGNANCIES, THIRD TRIMESTER: Primary | ICD-10-CM

## 2021-09-21 DIAGNOSIS — O35.EXX0 PYELECTASIS OF FETUS ON PRENATAL ULTRASOUND: ICD-10-CM

## 2021-09-21 DIAGNOSIS — Z98.891 PREVIOUS CESAREAN SECTION: ICD-10-CM

## 2021-09-21 DIAGNOSIS — I10 HYPERTENSION, GOAL BELOW 140/90: ICD-10-CM

## 2021-09-21 DIAGNOSIS — E66.01 MORBID OBESITY (H): ICD-10-CM

## 2021-09-21 DIAGNOSIS — O24.419 GESTATIONAL DIABETES MELLITUS (GDM) IN THIRD TRIMESTER, GESTATIONAL DIABETES METHOD OF CONTROL UNSPECIFIED: ICD-10-CM

## 2021-09-21 PROCEDURE — 99207 PR COMPLICATED OB VISIT: CPT | Performed by: FAMILY MEDICINE

## 2021-09-21 ASSESSMENT — PAIN SCALES - GENERAL: PAINLEVEL: NO PAIN (0)

## 2021-09-21 NOTE — NURSING NOTE
Health Maintenance Due   Topic Date Due     ANNUAL REVIEW OF HM ORDERS  Never done     ADVANCE CARE PLANNING  Never done     HEPATITIS B IMMUNIZATION (2 of 3 - 3-dose primary series) 01/10/2017     PREVENTIVE CARE VISIT  10/10/2020     MATERNAL SCREENING  Never done     REPEAT ANTIBODY SCREEN (OB)  08/23/2021     INFLUENZA VACCINE (1) 09/01/2021     32w1d    Edgar Villanueva, [facility]

## 2021-09-22 PROBLEM — O24.419 GESTATIONAL DIABETES MELLITUS (GDM) IN THIRD TRIMESTER, GESTATIONAL DIABETES METHOD OF CONTROL UNSPECIFIED: Status: ACTIVE | Noted: 2021-09-22

## 2021-09-22 NOTE — PROGRESS NOTES
Doing well overall.  No bleeding, no regular ctx.  BPs have been running better, low 130s/80s. On 200 mg labetalol bid. Headaches persist but mild, manageable.   No vision changes or other symptoms of concern.   Has repeat us at [facility] in 2 days.   She is checking her blood sugars qid, mostly at goal except in am. She is meeting with diabetes ed later this week to review.   Will follow up here in 2 weeks, will be starting weekly BPPs at that time.   Discussed warning signs for preeclampsia.  Will f/u sooner with any concern for decreased fetal movement, vaginal bleeding, fluid leak, headache, vision change, severe nausea or vomiting, abdominal pain, increased edema or other concerns.   Rhoda Redman MD

## 2021-09-23 ENCOUNTER — TELEPHONE (OUTPATIENT)
Dept: UROLOGY | Facility: CLINIC | Age: 31
End: 2021-09-23

## 2021-09-23 ENCOUNTER — OFFICE VISIT (OUTPATIENT)
Dept: MATERNAL FETAL MEDICINE | Facility: CLINIC | Age: 31
End: 2021-09-23
Attending: OBSTETRICS & GYNECOLOGY
Payer: COMMERCIAL

## 2021-09-23 ENCOUNTER — HOSPITAL ENCOUNTER (OUTPATIENT)
Dept: ULTRASOUND IMAGING | Facility: CLINIC | Age: 31
End: 2021-09-23
Attending: OBSTETRICS & GYNECOLOGY
Payer: COMMERCIAL

## 2021-09-23 DIAGNOSIS — O35.EXX0 ENCOUNTER FOR REPEAT ULTRASOUND OF FETAL PYELECTASIS IN SINGLETON PREGNANCY, ANTEPARTUM: ICD-10-CM

## 2021-09-23 DIAGNOSIS — O35.EXX0 RENAL ABNORMALITY OF FETUS ON PRENATAL ULTRASOUND: Primary | ICD-10-CM

## 2021-09-23 DIAGNOSIS — O10.919 CHRONIC HYPERTENSION IN PREGNANCY: ICD-10-CM

## 2021-09-23 PROCEDURE — 76819 FETAL BIOPHYS PROFIL W/O NST: CPT

## 2021-09-23 PROCEDURE — 76816 OB US FOLLOW-UP PER FETUS: CPT | Mod: 26 | Performed by: OBSTETRICS & GYNECOLOGY

## 2021-09-23 PROCEDURE — 76819 FETAL BIOPHYS PROFIL W/O NST: CPT | Mod: 26 | Performed by: OBSTETRICS & GYNECOLOGY

## 2021-09-23 PROCEDURE — 76816 OB US FOLLOW-UP PER FETUS: CPT

## 2021-09-23 NOTE — PROGRESS NOTES
"Please see \"Imaging\" tab under \"Chart Review\" for details of today's visit.    Edmond Merritt    "

## 2021-09-24 ENCOUNTER — VIRTUAL VISIT (OUTPATIENT)
Dept: EDUCATION SERVICES | Facility: CLINIC | Age: 31
End: 2021-09-24
Payer: COMMERCIAL

## 2021-09-24 ENCOUNTER — MYC MEDICAL ADVICE (OUTPATIENT)
Dept: EDUCATION SERVICES | Facility: CLINIC | Age: 31
End: 2021-09-24

## 2021-09-24 ENCOUNTER — TELEPHONE (OUTPATIENT)
Dept: EDUCATION SERVICES | Facility: CLINIC | Age: 31
End: 2021-09-24

## 2021-09-24 DIAGNOSIS — O24.419 GESTATIONAL DIABETES MELLITUS (GDM) IN THIRD TRIMESTER, GESTATIONAL DIABETES METHOD OF CONTROL UNSPECIFIED: Primary | ICD-10-CM

## 2021-09-24 PROCEDURE — G0108 DIAB MANAGE TRN  PER INDIV: HCPCS

## 2021-09-24 NOTE — PROGRESS NOTES
Diabetes Self-Management Education & Support    SUBJECTIVE/OBJECTIVE:  Presents for education related to gestational diabetes.   Type of Service: Telephone Visit  How would patient like to obtain AVS? Keon  Accompanied by: Self  Diabetes management related comments/concerns: None is specific  Gestational weeks: 31 weeks  Next OB Visit Date: 21  Number of previous preganancies:  (Been pregnant total of 6 times and have 2 boys)  Had any babies over 9 lbs: No (but both came three weeks early)  Previously had Gestational Diabetes: No  Do you drink beer, wine or hard liquor?:  (yes, but not while pregnant)    Cultural Influences/Ethnic Background:  Not  or     LMP 2021 (Exact Date)     Pre-pregnancy weight (lbs): 251   Wt Readings from Last 5 Encounters:   21 117.2 kg (258 lb 6.4 oz)   21 116.8 kg (257 lb 6.4 oz)   21 115.9 kg (255 lb 9.6 oz)   21 116.1 kg (256 lb)   21 115.2 kg (254 lb)     Estimated Date of Delivery: Nov 15, 2021    Blood Glucose/Ketone Log:  Date Breakfast Breakfast Lunch Dinner    Before After After After    96 125 105 139     94 113 missed missed    93 131 118 131    92 122 134 151 (chinese / rice / bigger meal)    100 missed missed missed    99 138 124 103    106 missed missed 167 (tacos)    98        Has noticed movement lower blood sugars   Feels like she is eating enough    Lifestyle and Health Behaviors:  Cultural/Yazdanism diet restrictions?: No (no - tries to watch salt intake due to BP)  Meal planning/habits: Smaller portions    Biggest challenges to healthy eating:  (being at home as made things a little more difficult)  Pre-denise vitamin?: Yes  Supplements?: No  Experiencing nausea?: No  Experiencing heartburn?: Yes (not often, but does get it)    Healthy Coping:  Emotional response to diabetes: Ready to learn, Acceptance  Informal Support system:: Family  Stage of change: ACTION (Actively working  towards change)    Current Management:  Taking medications for gestational diabetes?: No  Difficulty affording diabetes testing supplies?: No    ASSESSMENT:   Fasting blood glucoses: 38% in target.  After breakfast: 100% in target.  After lunch: 100% in target.  After dinner: 60% in target.    INTERVENTION:  Educational topics covered today:  What to expect after delivery, Future testing for Type 2 diabetes (2 hour OGTT at 6 week post-partum check-up and annual fasting blood glucose level), Risk of GDM and planning ahead for future pregnancies, Recommended lifestyle interventions for reducing the risk of Type 2 Diabetes, When to Call a Diabetes Educator or OB Provider.      Overall feels to be eating enough and reviewed carbohydrates and experimenting with different meals / activity and blood sugar results.  Due to fasting blood sugars rising, Insulin injection technique taught using a Pen for cloudy NPH at bedtime. Patient verbalized understanding and was able to perform an accurate return demonstration of injection technique.  Discussed mixing insulin, storage, sharps, new needle each use, site selection, action of insulin and hypoglycemia signs, symptoms and treatment.   Telephone encounter to OB      PLAN:  Check glucose 4 times daily.  Check ketones once a week when readings are consistently negative.  Continue with recommended physical activity.  Continue to follow recommended meal plan  Follow consistent CHO meal plan, eat CHO and protein/fat at all meals/snacks.  Pharmacy - [facility]  - telephone encounter to OB     Discussed weekly follow up via [employer] for ongoing communication     Call/e-mail/Skimlinks message diabetes educator if 3 or more blood sugars are above the goal in 1 week or if ketones are positive.    Pita Parada RD, FLETCHER, [facility]  Diabetes Education    Time Spent: 30 minutes  Encounter Type: Individual

## 2021-09-24 NOTE — TELEPHONE ENCOUNTER
Hi Dr. Redman,     Please note if you approve of this plan or indicate an alternate plan.     OK to begin NPH insulin (Humulin or Novolin per insurance formulary) at 10 units every bedtime (0.09units/kg).  Fasting blood sugars 38% in target and increasing trend over the last week.      Date Breakfast Breakfast Lunch Dinner    Before After After After   9/17 96 125 105 139   9/18  94 113 missed missed   9/19 93 131 118 131   9/20 92 122 134 151 (chinese / rice / bigger meal)   9/21 100 missed missed missed   9/22 99 138 124 103   9/23 106 missed missed 167 (tacos)   9/24 98          Thanks!  Zahida Parada RD, LD, [facility]  Diabetes Education

## 2021-09-27 NOTE — TELEPHONE ENCOUNTER
Gestational Diabetes Follow-up    Subjective/Objective:    Marie Yeboah sent in blood glucose log for review. Last date of communication was: 9/24, NPH insulin was requested.  ~ NPH does not appear in her med list today    Gestational diabetes is being managed with diet and activity    Taking diabetes medications: no    Estimated Date of Delivery: Nov 15, 2021    BG/Food Log:     Assessment:    Ketones: not noted.   Fasting blood glucoses: 100% in target. Since CDE contact 9/24   After breakfast: 100% in target.  After lunch: 100% in target.  After dinner: 50% in target.    Plan/Response:  No changes in the patient's current treatment plan.    Reply:  Ramakrishna Salazar,   Thanks so much for the update!   I know you're experimenting with bedtime snacks, looks like you found one that's helping??   Last 3 fasting readings are all on track ;)     I still don't see that doc signed off the insulin order, so for now, please keep doing what you're doing, your body is responding well!     If you can please send in again on Friday, we can see how things look going into the weekend and always reach out to doc again. when needed.     You're doing super! Talk Friday, Dannielle Salazar RD, FLETCHER, [facility]    Dannielle Salazar RD, FLETCHER, [facility]    Any diabetes medication dose changes were made via the CDE Protocol and Collaborative Practice Agreement with the patient's OB/GYN provider

## 2021-09-29 ENCOUNTER — TELEPHONE (OUTPATIENT)
Dept: FAMILY MEDICINE | Facility: CLINIC | Age: 31
End: 2021-09-29
Payer: COMMERCIAL

## 2021-09-29 ENCOUNTER — HOSPITAL ENCOUNTER (OUTPATIENT)
Facility: CLINIC | Age: 31
LOS: 1 days | Discharge: HOME | End: 2021-09-29
Attending: FAMILY MEDICINE | Admitting: FAMILY MEDICINE
Payer: COMMERCIAL

## 2021-09-29 VITALS
TEMPERATURE: 97.6 F | RESPIRATION RATE: 16 BRPM | HEART RATE: 95 BPM | OXYGEN SATURATION: 98 % | DIASTOLIC BLOOD PRESSURE: 76 MMHG | SYSTOLIC BLOOD PRESSURE: 146 MMHG

## 2021-09-29 DIAGNOSIS — I10 HYPERTENSION, GOAL BELOW 140/90: ICD-10-CM

## 2021-09-29 DIAGNOSIS — O24.419 GESTATIONAL DIABETES MELLITUS (GDM) IN THIRD TRIMESTER, GESTATIONAL DIABETES METHOD OF CONTROL UNSPECIFIED: Primary | ICD-10-CM

## 2021-09-29 PROBLEM — Z36.89 ENCOUNTER FOR TRIAGE IN PREGNANT PATIENT: Status: ACTIVE | Noted: 2021-09-29

## 2021-09-29 LAB
ALBUMIN SERPL-MCNC: 2.6 G/DL (ref 3.4–5)
ALP SERPL-CCNC: 77 U/L (ref 40–150)
ALT SERPL W P-5'-P-CCNC: 15 U/L (ref 0–50)
ANION GAP SERPL CALCULATED.3IONS-SCNC: 7 MMOL/L (ref 3–14)
AST SERPL W P-5'-P-CCNC: 13 U/L (ref 0–45)
BILIRUB SERPL-MCNC: 0.2 MG/DL (ref 0.2–1.3)
BUN SERPL-MCNC: 13 MG/DL (ref 7–30)
CALCIUM SERPL-MCNC: 8.7 MG/DL (ref 8.5–10.1)
CHLORIDE BLD-SCNC: 106 MMOL/L (ref 94–109)
CO2 SERPL-SCNC: 22 MMOL/L (ref 20–32)
CREAT SERPL-MCNC: 0.74 MG/DL (ref 0.52–1.04)
CREAT UR-MCNC: 56 MG/DL
CREAT UR-MCNC: 58 MG/DL
ERYTHROCYTE [DISTWIDTH] IN BLOOD BY AUTOMATED COUNT: 13.2 % (ref 10–15)
GFR SERPL CREATININE-BSD FRML MDRD: >90 ML/MIN/1.73M2
GLUCOSE BLD-MCNC: 153 MG/DL (ref 70–99)
HCT VFR BLD AUTO: 35.5 % (ref 35–47)
HGB BLD-MCNC: 12.1 G/DL (ref 11.7–15.7)
MCH RBC QN AUTO: 28.3 PG (ref 26.5–33)
MCHC RBC AUTO-ENTMCNC: 34.1 G/DL (ref 31.5–36.5)
MCV RBC AUTO: 83 FL (ref 78–100)
PLATELET # BLD AUTO: 241 10E3/UL (ref 150–450)
POTASSIUM BLD-SCNC: 3.6 MMOL/L (ref 3.4–5.3)
PROT SERPL-MCNC: 6.7 G/DL (ref 6.8–8.8)
PROT UR-MCNC: 0.06 G/L
PROT/CREAT 24H UR: 0.1 G/G CR (ref 0–0.2)
RBC # BLD AUTO: 4.27 10E6/UL (ref 3.8–5.2)
SODIUM SERPL-SCNC: 135 MMOL/L (ref 133–144)
URATE 24H UR-MRATE: 0.35 G/G CR
URATE UR-MCNC: 19.6 MG/DL
WBC # BLD AUTO: 8.4 10E3/UL (ref 4–11)

## 2021-09-29 PROCEDURE — 59025 FETAL NON-STRESS TEST: CPT

## 2021-09-29 PROCEDURE — 82040 ASSAY OF SERUM ALBUMIN: CPT | Performed by: FAMILY MEDICINE

## 2021-09-29 PROCEDURE — 84156 ASSAY OF PROTEIN URINE: CPT | Performed by: FAMILY MEDICINE

## 2021-09-29 PROCEDURE — 85027 COMPLETE CBC AUTOMATED: CPT | Performed by: FAMILY MEDICINE

## 2021-09-29 PROCEDURE — 84560 ASSAY OF URINE/URIC ACID: CPT | Performed by: FAMILY MEDICINE

## 2021-09-29 PROCEDURE — 36415 COLL VENOUS BLD VENIPUNCTURE: CPT | Performed by: FAMILY MEDICINE

## 2021-09-29 PROCEDURE — G0463 HOSPITAL OUTPT CLINIC VISIT: HCPCS | Mod: 25

## 2021-09-29 RX ORDER — MAGNESIUM SULFATE HEPTAHYDRATE 40 MG/ML
2 INJECTION, SOLUTION INTRAVENOUS
Status: DISCONTINUED | OUTPATIENT
Start: 2021-09-29 | End: 2021-09-29 | Stop reason: HOSPADM

## 2021-09-29 RX ORDER — HYDRALAZINE HYDROCHLORIDE 20 MG/ML
10 INJECTION INTRAMUSCULAR; INTRAVENOUS
Status: DISCONTINUED | OUTPATIENT
Start: 2021-09-29 | End: 2021-09-29 | Stop reason: HOSPADM

## 2021-09-29 RX ORDER — LABETALOL HYDROCHLORIDE 5 MG/ML
20-80 INJECTION, SOLUTION INTRAVENOUS EVERY 10 MIN PRN
Status: DISCONTINUED | OUTPATIENT
Start: 2021-09-29 | End: 2021-09-29 | Stop reason: HOSPADM

## 2021-09-29 RX ORDER — LABETALOL 100 MG/1
300 TABLET, FILM COATED ORAL 2 TIMES DAILY
Qty: 360 TABLET | Refills: 1 | Status: ON HOLD | OUTPATIENT
Start: 2021-09-29 | End: 2021-10-27

## 2021-09-29 RX ORDER — MAGNESIUM SULFATE HEPTAHYDRATE 40 MG/ML
4 INJECTION, SOLUTION INTRAVENOUS
Status: DISCONTINUED | OUTPATIENT
Start: 2021-09-29 | End: 2021-09-29 | Stop reason: HOSPADM

## 2021-09-29 RX ORDER — LORAZEPAM 2 MG/ML
2 INJECTION INTRAMUSCULAR
Status: DISCONTINUED | OUTPATIENT
Start: 2021-09-29 | End: 2021-09-29 | Stop reason: HOSPADM

## 2021-09-29 RX ORDER — MAGNESIUM SULFATE HEPTAHYDRATE 500 MG/ML
10 INJECTION, SOLUTION INTRAMUSCULAR; INTRAVENOUS
Status: DISCONTINUED | OUTPATIENT
Start: 2021-09-29 | End: 2021-09-29 | Stop reason: HOSPADM

## 2021-09-29 RX ORDER — SODIUM CHLORIDE, SODIUM LACTATE, POTASSIUM CHLORIDE, CALCIUM CHLORIDE 600; 310; 30; 20 MG/100ML; MG/100ML; MG/100ML; MG/100ML
10-125 INJECTION, SOLUTION INTRAVENOUS CONTINUOUS
Status: DISCONTINUED | OUTPATIENT
Start: 2021-09-29 | End: 2021-09-29 | Stop reason: HOSPADM

## 2021-09-29 RX ORDER — LIDOCAINE 40 MG/G
CREAM TOPICAL
Status: DISCONTINUED | OUTPATIENT
Start: 2021-09-29 | End: 2021-09-29 | Stop reason: HOSPADM

## 2021-09-29 NOTE — PROGRESS NOTES
S: Triage Arrival  B: Marie is a 31 y.o.  @ 33w 2d who presents with complaint of headache, eye pressure and blurred vision. She checked her BP at home and it was 156/106. With her history of preeclampsia she decided to come in.  A: /100. C/o headache 4/10, not better with tylenol. Mild edema to feet. 2+ reflexes, no clonus. EFM applied. FHT's140 with moderate variability, acels present, no decels noted on strip. Contractions none. Plan to check BP q15 mins. Urine and blood collected.  R: Will notify MD to obtain further orders.

## 2021-09-29 NOTE — PROGRESS NOTES
S:  Discharge from triage.   A:  FHT's 145, Moderate variability currenlty. Minimal variability at times and accels not present at this time along with one dip in HR noted and was reported to Ann. Ann not concerned at this time as can be normal for 33w gestational age. Contractions infreq.  Cervical exam not performed. Plan to increase labetalol dose to 300mg twice daily. Ann feels this is hypertension worsening, not preeclampsia.  R:  Written and verbal D/C instruction provided. Pt. Verbalized understanding of D/C instructions and will follow up with Юлия as previously scheduled.   Verbalizes understanding that she will call or return to the [facility] with any further questions or concerns.   Pt. D/C'd via walkout.     Nursing Discharge Checklist  Discharge order entered: Yes  Patient care order entered: Yes  Charges entered: Yes  IV start and stop times have been documented in Epic? na  NST start and stop times have been documented in Epic Doc F/S? Yes

## 2021-09-29 NOTE — PROVIDER NOTIFICATION
09/29/21 1730   Provider Notification   Provider Name/Title Edman   Method of Notification Phone   Request Evaluate-Remote   Notification Reason Status Update;Lab Results   Edman updated to BP in severe range x1. Hypertensive order set placed if needed. Waiting on lab results and will call Edman back if another severe range BP requiring treatment.

## 2021-09-29 NOTE — PROGRESS NOTES
[facility]    Triage Note Obstetrics and Gynecology     Date of Admission:  2021    Assessment & Plan   Marie Yeboah is a 31 year old female who presents with chronic hypertension and h/o preE  ASSESSMENT:   IUP @ 33w2d with worsening hypertension  NST reactive.  Category  I    PLAN:   Labs done with reassuring results - normal protein and other labs (uric acid still pending)  BP has been elevated and 1 severe range value, so we increased labetalol and will plan follow-up in 1 week.    Magali Juarez MD    History of Present Illness   Marie Yeboah is a 31 year old female  33w2d  Estimated Date of Delivery: 11/15/21 is calculated from Patient's last menstrual period was 2021 (exact date). [facility]  With elevated BP and headache.    PRENATAL COURSE  Prenatal course was   complicated by    Patient Active Problem List    Diagnosis Date Noted     Encounter for triage in pregnant patient 2021     Priority: Medium     Gestational diabetes mellitus (GDM) in third trimester, gestational diabetes method of control unspecified 2021     Priority: Medium     Nausea & vomiting 2021     Priority: Medium     Pyelectasis of fetus on prenatal ultrasound 2021     Priority: Medium     Supervision of other high risk pregnancies, third trimester 2021     Priority: Medium     Previous  section 2021     Priority: Medium     History of pre-eclampsia in prior pregnancy, currently pregnant 2021     Priority: Medium     Nonintractable episodic headache, unspecified headache type 2021     Priority: Medium     Anxiety 2021     Priority: Medium     Anaphylaxis 2020     Priority: Medium     Rhinoconjunctivitis 2020     Priority: Medium     Segmental dysfunction of cervical region 2019     Priority: Medium     Segmental dysfunction of thoracic region 2019     Priority: Medium     Segmental  dysfunction of lumbar region 08/02/2019     Priority: Medium     Segmental dysfunction of sacral region 08/02/2019     Priority: Medium     Sprain of ligaments of cervical spine 08/02/2019     Priority: Medium     Hypertension, goal below 140/90 10/12/2018     Priority: Medium     Obesity (BMI 35.0-39.9) with comorbidity (H) 10/12/2018     Priority: Medium         Recent Labs   Lab Test 03/22/21  0837   ABO A   RH Pos   AS Neg     Rhogam not indicated   Recent Labs   Lab Test 03/22/21  0837   HEPBANG Nonreactive   HIAGAB Nonreactive   RUQIGG 13       Past Medical History    I have reviewed this patient's medical history and updated it with pertinent information if needed.   Past Medical History:   Diagnosis Date     Anxiety      ASCUS with positive high risk HPV cervical 12/08/2008 12/8/2008 (age 18) per Care Everywhere. NIL paps in '16, '17 & '20     Depressive disorder 2007     Gestational diabetes mellitus (GDM) in third trimester, gestational diabetes method of control unspecified 9/22/2021     HTN (hypertension)        Past Surgical History   I have reviewed this patient's surgical history and updated it with pertinent information if needed.  Past Surgical History:   Procedure Laterality Date     C/SECTION, LOW TRANSVERSE      2015, 2016     HC REMOVAL OF TONSILS,<13 Y/O         Prior to Admission Medications   Prior to Admission Medications   Prescriptions Last Dose Informant Patient Reported? Taking?   EPINEPHrine (AUVI-Q) 0.3 MG/0.3ML injection 2-pack   No No   Sig: Inject 0.3 mLs (0.3 mg) into the muscle as needed for anaphylaxis   Prenatal Vit-Fe Fumarate-FA (PNV FOLIC ACID + IRON) 27-1 MG TABS 9/29/2021 at 0800  No Yes   Sig: Take 1 tablet by mouth daily   acetone urine (KETOSTIX) test strip   No No   Sig: Test with first void of the day for 7 days.  If negative or trace for 7 days, reduce testing to one time per week.   alcohol swab prep pads   No No   Sig: Use to swab area of injection/carolina as  directed.   aspirin (ASA) 81 MG EC tablet 9/29/2021 at 0800  No Yes   Sig: Take 1 tablet (81 mg) by mouth daily   blood glucose (NO BRAND SPECIFIED) lancets standard   No No   Sig: Use to test blood sugar 4-6 times daily or as directed.   blood glucose (NO BRAND SPECIFIED) test strip   No No   Sig: Use to test blood sugar 4-6 times daily or as directed.   blood glucose calibration (NO BRAND SPECIFIED) solution   No No   Sig: To accompany: Blood Glucose Monitor Brands: per insurance.   blood glucose monitoring (NO BRAND SPECIFIED) meter device kit   No No   Sig: Use to test blood sugar 4-6 times daily or as directed. Preferred blood glucose meter OR supplies to accompany: Blood Glucose Monitor Brands: per insurance.   butalbital-acetaminophen-caffeine (ESGIC) -40 MG tablet 9/28/2021 at 2100  No Yes   Sig: Take 1 tablet by mouth every 4 hours as needed for headaches   docusate sodium (COLACE) 100 MG capsule 9/28/2021 at 2100  No Yes   Sig: Take 1 capsule (100 mg) by mouth 2 times daily as needed for constipation   insulin  UNIT/ML injection 9/28/2021 at 2100  No Yes   Sig: Inject 10 Units Subcutaneous At Bedtime   labetalol (NORMODYNE) 100 MG tablet 9/29/2021 at 0800  No Yes   Sig: Take 2 tablets (200 mg) by mouth 2 times daily   ondansetron (ZOFRAN-ODT) 4 MG ODT tab 9/28/2021 at 2100  No Yes   Sig: Take 1 tablet (4 mg) by mouth every 6 hours as needed for nausea or vomiting   sertraline (ZOLOFT) 25 MG tablet 9/28/2021 at 2100  No Yes   Sig: Take 1 tablet (25 mg) by mouth daily      Facility-Administered Medications: None     Allergies   Allergies   Allergen Reactions     Contrast Dye Anaphylaxis     Bee Venom      Other reaction(s): Hives  Other reaction(s): Hives       Social History   I have reviewed this patient's social history and updated it with pertinent information if needed. Marie Yeboah  reports that she has quit smoking. Her smoking use included cigarettes. She started smoking about 14 years  ago. She quit after 5.00 years of use. She has never used smokeless tobacco. She reports current alcohol use. She reports that she does not use drugs.    Family History   I have reviewed this patient's family history and updated it with pertinent information if needed.   Family History   Problem Relation Age of Onset     Hypertension Mother      Seizure Disorder Mother      Unknown/Adopted Mother      Hypertension Father      Heart Disease Father      Hyperlipidemia Father      Anxiety Disorder Father      Brain Tumor Paternal Grandfather      Prostate Cancer Paternal Grandfather      No Known Problems Sister      No Known Problems Son      No Known Problems Son      Diabetes Paternal Grandmother      No Known Problems Sister      Physical Exam   Temp: 97.6  F (36.4  C) Temp src: Oral BP: (!) 148/74 Pulse: 88   Resp: 16 SpO2: 98 % O2 Device: None (Room air)    Vital Signs with Ranges  Temp:  [97.6  F (36.4  C)] 97.6  F (36.4  C)  Pulse:  [] 88  Resp:  [16] 16  BP: (144-174)/() 148/74  SpO2:  [98 %] 98 %      Fetal Heart Tones: 145 baseline, moderate variablility, + accels, no decels and Category I  TOCO:   frequency none

## 2021-09-29 NOTE — TELEPHONE ENCOUNTER
Prior Authorization Retail Medication Request    Medication/Dose: humulin n kwikpen  ICD code (if different than what is on RX):     Previously Tried and Failed:     Rationale:       Insurance Name:   Paid medco  Insurance ID:  [id number]      Pharmacy Information (if different than what is on RX)  Name:     Phone:

## 2021-09-30 NOTE — RESULT ENCOUNTER NOTE
Ms. Obinna    I am resulting your labs as a provider is out of office.  Normal urine test.    If you have any questions or concerns please contact me via My Chart or call the clinic at [phone]     Thank You  Wilma Henley MD.

## 2021-09-30 NOTE — TELEPHONE ENCOUNTER
I do not see a prescription for Humulin Kwikpen in Epic. Please enter one including the provider and diagnosis code and send back to the PA Team.    Central Prior Authorization Team  Phone: [phone]

## 2021-10-01 ENCOUNTER — PRE VISIT (OUTPATIENT)
Dept: UROLOGY | Facility: CLINIC | Age: 31
End: 2021-10-01

## 2021-10-01 ENCOUNTER — MYC MEDICAL ADVICE (OUTPATIENT)
Dept: EDUCATION SERVICES | Facility: OTHER | Age: 31
End: 2021-10-01

## 2021-10-01 NOTE — TELEPHONE ENCOUNTER
"Gestational Diabetes Follow-up    Subjective/Objective:    Marie Yeboah sent in blood glucose log for review. Last date of communication was: 9-24-21.    Gestational diabetes is being managed with diet and activity    Taking diabetes medications: no (insulin order was approved on 9/29 if needed)    Estimated Date of Delivery: Nov 15, 2021    BG/Food Log:         Assessment:    Ketones: Not noted.   Fasting blood glucoses: 100% in target.  After breakfast: 100% in target.  Before lunch: x% in target.  After lunch: 100% in target.  Before dinner: x% in target.  After dinner: 100% in target.    [employer] message:  \"Here are my levels from this week. Can we skip the insulin and just keep doing what I am doing as long as my number stay where they are? I've been at 100% goal all week. I've increased walking after supper and I think that's what's helping.\"    Plan/Response:  No changes in the patient's current treatment plan.  Follow-up on Tuesday.  [employer] response sent.    Rebecca Hassan RN, [facility]      Any diabetes medication dose changes were made via the CDE Protocol and Collaborative Practice Agreement with the patient's OB/GYN provider. A copy of this encounter was shared with the provider.      "

## 2021-10-01 NOTE — TELEPHONE ENCOUNTER
Chart reviewed patient contact not needed prior to appointment all necessary results available and ready for visit.    Edil Ford MA

## 2021-10-05 ENCOUNTER — PRENATAL OFFICE VISIT (OUTPATIENT)
Dept: FAMILY MEDICINE | Facility: CLINIC | Age: 31
End: 2021-10-05
Payer: COMMERCIAL

## 2021-10-05 ENCOUNTER — MYC MEDICAL ADVICE (OUTPATIENT)
Dept: EDUCATION SERVICES | Facility: OTHER | Age: 31
End: 2021-10-05

## 2021-10-05 ENCOUNTER — HOSPITAL ENCOUNTER (OUTPATIENT)
Dept: ULTRASOUND IMAGING | Facility: CLINIC | Age: 31
Discharge: HOME | End: 2021-10-05
Attending: FAMILY MEDICINE | Admitting: FAMILY MEDICINE
Payer: COMMERCIAL

## 2021-10-05 ENCOUNTER — VIRTUAL VISIT (OUTPATIENT)
Dept: UROLOGY | Facility: CLINIC | Age: 31
End: 2021-10-05
Attending: NURSE PRACTITIONER
Payer: COMMERCIAL

## 2021-10-05 VITALS
OXYGEN SATURATION: 99 % | HEART RATE: 104 BPM | DIASTOLIC BLOOD PRESSURE: 84 MMHG | TEMPERATURE: 97.5 F | SYSTOLIC BLOOD PRESSURE: 126 MMHG | WEIGHT: 262 LBS | BODY MASS INDEX: 41.04 KG/M2 | RESPIRATION RATE: 18 BRPM

## 2021-10-05 DIAGNOSIS — O35.EXX0 PYELECTASIS OF FETUS ON PRENATAL ULTRASOUND: Primary | ICD-10-CM

## 2021-10-05 DIAGNOSIS — O24.410 DIET CONTROLLED GESTATIONAL DIABETES MELLITUS (GDM) IN THIRD TRIMESTER: ICD-10-CM

## 2021-10-05 DIAGNOSIS — R51.9 NONINTRACTABLE EPISODIC HEADACHE, UNSPECIFIED HEADACHE TYPE: ICD-10-CM

## 2021-10-05 DIAGNOSIS — O35.EXX0 PYELECTASIS OF FETUS ON PRENATAL ULTRASOUND: ICD-10-CM

## 2021-10-05 DIAGNOSIS — O09.893 SUPERVISION OF OTHER HIGH RISK PREGNANCIES, THIRD TRIMESTER: Primary | ICD-10-CM

## 2021-10-05 DIAGNOSIS — O09.892 SUPERVISION OF OTHER HIGH RISK PREGNANCIES, SECOND TRIMESTER: ICD-10-CM

## 2021-10-05 DIAGNOSIS — O09.299 HISTORY OF PRE-ECLAMPSIA IN PRIOR PREGNANCY, CURRENTLY PREGNANT: ICD-10-CM

## 2021-10-05 DIAGNOSIS — I10 HYPERTENSION, GOAL BELOW 140/90: ICD-10-CM

## 2021-10-05 DIAGNOSIS — E66.01 MORBID OBESITY (H): ICD-10-CM

## 2021-10-05 DIAGNOSIS — Z98.891 PREVIOUS CESAREAN SECTION: ICD-10-CM

## 2021-10-05 PROBLEM — R11.2 NAUSEA & VOMITING: Status: RESOLVED | Noted: 2021-07-18 | Resolved: 2021-10-05

## 2021-10-05 PROBLEM — Z36.89 ENCOUNTER FOR TRIAGE IN PREGNANT PATIENT: Status: RESOLVED | Noted: 2021-09-29 | Resolved: 2021-10-05

## 2021-10-05 PROCEDURE — 99207 PR COMPLICATED OB VISIT: CPT | Performed by: FAMILY MEDICINE

## 2021-10-05 PROCEDURE — 99202 OFFICE O/P NEW SF 15 MIN: CPT | Mod: 95 | Performed by: NURSE PRACTITIONER

## 2021-10-05 PROCEDURE — 76819 FETAL BIOPHYS PROFIL W/O NST: CPT

## 2021-10-05 ASSESSMENT — PAIN SCALES - GENERAL: PAINLEVEL: NO PAIN (0)

## 2021-10-05 NOTE — NURSING NOTE
Marie Yeboah is a 31 year old female who is being evaluated via a billable telephone visit.      How would you like to obtain your AVS? MyChart    Marie Yeboah complains of  No chief complaint on file.      Patient is located in [place]? Yes     I have reviewed and updated the patient's medication list, allergies and preferred pharmacy.    Kimani Sandoval LPN

## 2021-10-05 NOTE — LETTER
10/5/2021      RE: Marie Yeboah  [address] [facility] [address]       Edmond Merritt  [address]  [facility] [address]    RE:  Marie Yeboah  :  1990  [place] MRN:  [id number]  Date of visit:  2021    Dear Dr. Merritt:    I had the pleasure of speaking with your patient, Marie, today through the [facility] in urology consultation for the question of prenatally detected pyelectasis. Please see below the details of this visit and my impression and plans discussed with the family.        CC:  Ultrasound     HPI:  Marie Yeboah is a 31 year old woman whom I was asked to see in consultation for the above. This is Marie's third pregnancy. The sex of the fetus is male. At the 32 week ultrasound Right renal pelvis dilation without dilation of the calyces persisted (UTD A1). So far the pregnancy has been going well. Marie is planning to deliver at [facility], scheduled  . There is no known family history of genitourinary disorders in childhood.    PMH:    Past Medical History:   Diagnosis Date     Anxiety      ASCUS with positive high risk HPV cervical 2008 (age 18) per Care Everywhere. NIL paps in '16, '17 & '20     Depressive disorder      Gestational diabetes mellitus (GDM) in third trimester, gestational diabetes method of control unspecified 2021     HTN (hypertension)        PSH:     Past Surgical History:   Procedure Laterality Date     C/SECTION, LOW TRANSVERSE      , 2016     HC REMOVAL OF TONSILS,<13 Y/O         Meds, allergies, family history, social history reviewed per intake form and confirmed in our EMR.    ROS:  Negative on a 12-point scale. All other pertinent positives mentioned in the HPI.    PE: No PE, telephone encounter  Last menstrual period 2021, not currently breastfeeding.  There is no height or weight on file to calculate BMI.    Impression:  Prenatally  detected fetal Right pyelectasis (UTD A1).    Plan:    We discussed the likely prenatal causes for this, including prenatal obstructive issues that have already resolved versus those that may need surgical help with resolution in childhood, as well as the possibility of vesicoureteral reflux. We discussed the risks for urinary tract infection, and the pros and cons of starting the baby on daily, low-dose Amoxicillin, dosed at 10 mg/kg/d, which in this case we will likely not do. We also made our plans for follow-up after the baby is born with renal/bladder ultrasound in 2-4 weeks. I addressed all questions and encouraged a phone call from their MFM if there are any future concerns during the pregnancy.    Thank you very much for allowing me the opportunity to participate in this nice family's care with you.    I spent a total of 14 minutes on the date of encounter doing chart review, history, documentation, and further activities as noted above.    Phone call duration: 6 minutes (09:01-09:07)    Sincerely,  KENDAL Roldan, CNP  Pediatric Urology  [facility]

## 2021-10-05 NOTE — PROGRESS NOTES
Doing well. Her BP runs higher on awakening in the am, up to 150s/90s, but then gradually comes down through the day, typically normal later in the day. She [facility] on  and had her labetalol dose increased, now on 300 mg bid. Labs were normal at that time.   She is checking her glucose qid, mostly at target range. Has had a couple elevated readings but improving with diet change. Is so far not needing any insulin, had update visit with DM ed today.   BPP today .   Also met virtually with urologist today. Plans to follow up baby 2-4 weeks after birth unless acute concerns arise.   Her headaches are becoming more frequent. Has used her headache pills twice in the past 2 weeks.   We discussed possibility of delivery sooner, at 37 weeks. Discussed increased risk of prematurity for baby especially given male gender and GDM. We discussed controversial use of betamethasone at this gestation to hasten fetal lung maturity, but increase risk of hyperglycemia and difficulty controlling glucoses.   We discussed increased risk of severe HTN, preeclampsia with risk of stroke, seizure, fetal distress or death from placental/vascular insuffiency with chronic HTN, hx previous preeclampsia and GDM.   We discussed that considering all factors, delivery at 37 weeks is reasonable. She is currently scheduled for delivery at 39 weeks, but will have her meet with Dr. Leblanc for pre-op and consents in 2 weeks (at 36 weeks) and anticipate moving delivery up to 37 weeks unless something changes between now and then.   Given her GDM, I am NOT going to treat with BMZ at this time.   Will plan to stop ASA at 36 weeks.   Discussed  labor.   Discussed warning signs for preeclampsia.  Will f/u in 2 week(s) or sooner with any concern for decreased fetal movement, vaginal bleeding, fluid leak, headache, vision change, severe nausea or vomiting, abdominal pain, increased edema or other concerns.   Rhoda ADORNO  MD Юлия

## 2021-10-05 NOTE — PROGRESS NOTES
Edmond Merritt  [address] [facility] [address]  [facility] [address]    RE:  Marie Yeboah  :  1990  [place] MRN:  [id number]  Date of visit:  2021    Dear Dr. Merritt:    I had the pleasure of speaking with your patient, Marie, today through the [facility] in urology consultation for the question of prenatally detected pyelectasis. Please see below the details of this visit and my impression and plans discussed with the family.        CC:  Ultrasound     HPI:  Marie Yeboah is a 31 year old woman whom I was asked to see in consultation for the above. This is Marie's third pregnancy. The sex of the fetus is male. At the 32 week ultrasound Right renal pelvis dilation without dilation of the calyces persisted (UTD A1). So far the pregnancy has been going well. Marie is planning to deliver at [facility], scheduled  . There is no known family history of genitourinary disorders in childhood.    PMH:    Past Medical History:   Diagnosis Date     Anxiety      ASCUS with positive high risk HPV cervical 2008 (age 18) per Care Everywhere. NIL paps in '16, '17 & '20     Depressive disorder      Gestational diabetes mellitus (GDM) in third trimester, gestational diabetes method of control unspecified 2021     HTN (hypertension)        PSH:     Past Surgical History:   Procedure Laterality Date     C/SECTION, LOW TRANSVERSE      , 2016     HC REMOVAL OF TONSILS,<11 Y/O         Meds, allergies, family history, social history reviewed per intake form and confirmed in our EMR.    ROS:  Negative on a 12-point scale. All other pertinent positives mentioned in the HPI.    PE: No PE, telephone encounter  Last menstrual period 2021, not currently breastfeeding.  There is no height or weight on file to calculate BMI.    Impression:  Prenatally detected fetal Right pyelectasis (UTD A1).    Plan:    We discussed the  likely prenatal causes for this, including prenatal obstructive issues that have already resolved versus those that may need surgical help with resolution in childhood, as well as the possibility of vesicoureteral reflux. We discussed the risks for urinary tract infection, and the pros and cons of starting the baby on daily, low-dose Amoxicillin, dosed at 10 mg/kg/d, which in this case we will likely not do. We also made our plans for follow-up after the baby is born with renal/bladder ultrasound in 2-4 weeks. I addressed all questions and encouraged a phone call from their MFM if there are any future concerns during the pregnancy.    Thank you very much for allowing me the opportunity to participate in this nice family's care with you.    I spent a total of 14 minutes on the date of encounter doing chart review, history, documentation, and further activities as noted above.    Phone call duration: 6 minutes (09:01-09:07)    Sincerely,  KENDAL Roldan, CNP  Pediatric Urology  [facility]

## 2021-10-05 NOTE — TELEPHONE ENCOUNTER
Gestational Diabetes Follow-up    Subjective/Objective:    Marie Yeboah sent in blood glucose log for review. Last date of communication was: 10/1/2021.    Gestational diabetes is being managed with diet and activity    Taking diabetes medications: no    Estimated Date of Delivery: Nov 15, 2021    BG/Food Log:             Assessment:    Ketones: neg.   Fasting blood glucoses: 80% in target.  After breakfast: 50% in target, 100% in most recent 2 days  After lunch: 100% in target.  After dinner: 100% in target.    Plan/Response:  No changes in the patient's current treatment plan.  Follow-up in 1 week.    BENNY Pozo CDCES    Any diabetes medication dose changes were made via the CDE Protocol and Collaborative Practice Agreement with the patient's OB/GYN provider. A copy of this encounter was shared with the provider.

## 2021-10-05 NOTE — PATIENT INSTRUCTIONS
[facility]   Department of Pediatric Urology    Chandrakant Galvan, GINI [place], GINI    [facility]  Nurse Coordinator: Love Murphy, [phone]    [facility]  Nurse Coordinator: KEVAN CervantesN, [phone]    [place]  Nurse Coordinator: Francisca Penny RN, BSN, [phone]    [place]  Nurse Coordinator: OMERO Tapia, [phone]      Once your baby is born, please do the following:    -After you have gone home, please call the Nurse Coordinator at your preferred  location and give her your baby s name and date of birth. She will  help coordinate the tests that need to be done about 4 weeks  after birth.    Your baby s test may include:  -Renal Bladder Ultrasound  (all locations)  - Voiding Cystourethrogram (VCUG)  ([facility], )  -Mag 3 Lasix Renogram  (ONLY [facility])

## 2021-10-06 ENCOUNTER — MYC MEDICAL ADVICE (OUTPATIENT)
Dept: FAMILY MEDICINE | Facility: CLINIC | Age: 31
End: 2021-10-06

## 2021-10-06 DIAGNOSIS — Z11.59 ENCOUNTER FOR SCREENING FOR OTHER VIRAL DISEASES: ICD-10-CM

## 2021-10-06 DIAGNOSIS — O09.893 SUPERVISION OF OTHER HIGH RISK PREGNANCIES, THIRD TRIMESTER: Primary | ICD-10-CM

## 2021-10-06 NOTE — PATIENT INSTRUCTIONS
I spoke with Dr. Leblanc today. We are going to try to move your surgery up to 10/25.   I'll contact you with more details once I have them.     For now, you are scheduled to meet with him on 10/18 for your pre-op instead of 10/19 with me. I'm keeping that 10/19 appt on the schedule just in case.     I'll call you once I know more.     I'm planning to stop your aspirin at 36 weeks in preparation for your surgery.

## 2021-10-06 NOTE — TELEPHONE ENCOUNTER
Routing to provider to advise and sign patient needs breast pump RX sent to [phone] her  got moved up to 10/25/2021  Sujata Antony MA

## 2021-10-11 ENCOUNTER — HOSPITAL ENCOUNTER (OUTPATIENT)
Dept: ULTRASOUND IMAGING | Facility: CLINIC | Age: 31
Discharge: HOME | End: 2021-10-11
Attending: FAMILY MEDICINE | Admitting: FAMILY MEDICINE
Payer: COMMERCIAL

## 2021-10-11 DIAGNOSIS — O09.892 SUPERVISION OF OTHER HIGH RISK PREGNANCIES, SECOND TRIMESTER: ICD-10-CM

## 2021-10-11 DIAGNOSIS — O09.299 HISTORY OF PRE-ECLAMPSIA IN PRIOR PREGNANCY, CURRENTLY PREGNANT: ICD-10-CM

## 2021-10-11 PROCEDURE — 76819 FETAL BIOPHYS PROFIL W/O NST: CPT

## 2021-10-12 ENCOUNTER — TELEPHONE (OUTPATIENT)
Dept: EDUCATION SERVICES | Facility: CLINIC | Age: 31
End: 2021-10-12

## 2021-10-12 ENCOUNTER — MYC MEDICAL ADVICE (OUTPATIENT)
Dept: EDUCATION SERVICES | Facility: OTHER | Age: 31
End: 2021-10-12

## 2021-10-12 NOTE — TELEPHONE ENCOUNTER
Patient currently has only 63% in target fasting blood sugars this past week. They are all <100, but certainly creeping up from the last few weeks.     Given that she is close to her due date - are you ok with her staying off insulin, or should we be starting some NPH for her last 2 weeks of pregnancy?    Thanks!  BENNY Pozo [facility]

## 2021-10-12 NOTE — TELEPHONE ENCOUNTER
Gestational Diabetes Follow-up    Subjective/Objective:    Marie M Aamirkeaton sent in blood glucose log for review. Last date of communication was: 10/5/2021.    Gestational diabetes is being managed with diet, activity    Taking diabetes medications:   yes:     Diabetes Medication(s)     Insulin       insulin  UNIT/ML injection    Inject 10 Units Subcutaneous At Bedtime     Patient not taking: Reported on 10/5/2021      NOT TAKING    Estimated Date of Delivery: Nov 15, 2021    BG/Food Log:   Yesterday's number:  92 fasting  125 breakfast  91 lunch  177 supper (I ate a blizzard, looks like no more blizzards for me)     Today:  96 fasting  127 breakfast  123 lunch              Assessment:    Ketones: na.   Fasting blood glucoses: 63% in target.  After breakfast: 100% in target.  After lunch: 85% in target.  After dinner: 85% in target.    Plan/Response:  Will reach out to OB to see their preference on insulin vs monitoring for these last two weeks of pregnancy    BENNY Pozo CDCES    Any diabetes medication dose changes were made via the CDE Protocol and Collaborative Practice Agreement with the patient's OB/GYN provider. A copy of this encounter was shared with the provider.

## 2021-10-13 NOTE — TELEPHONE ENCOUNTER
Lets do the insulin, it's short term but she needs to stay in strict control these last 2 weeks if possible. Baby will be born early and at risk for hypoglycemia.   Rhoda Redman MD

## 2021-10-18 ENCOUNTER — MYC MEDICAL ADVICE (OUTPATIENT)
Dept: EDUCATION SERVICES | Facility: OTHER | Age: 31
End: 2021-10-18

## 2021-10-18 ENCOUNTER — OFFICE VISIT (OUTPATIENT)
Dept: FAMILY MEDICINE | Facility: CLINIC | Age: 31
End: 2021-10-18
Payer: COMMERCIAL

## 2021-10-18 VITALS
DIASTOLIC BLOOD PRESSURE: 86 MMHG | WEIGHT: 259 LBS | TEMPERATURE: 97.7 F | HEART RATE: 101 BPM | OXYGEN SATURATION: 95 % | SYSTOLIC BLOOD PRESSURE: 128 MMHG | RESPIRATION RATE: 20 BRPM | BODY MASS INDEX: 40.57 KG/M2

## 2021-10-18 DIAGNOSIS — Z01.818 PREOP GENERAL PHYSICAL EXAM: Primary | ICD-10-CM

## 2021-10-18 DIAGNOSIS — I10 HYPERTENSION, GOAL BELOW 140/90: ICD-10-CM

## 2021-10-18 PROCEDURE — 99207 PR COMPLICATED OB VISIT: CPT | Performed by: OBSTETRICS & GYNECOLOGY

## 2021-10-18 ASSESSMENT — PAIN SCALES - GENERAL: PAINLEVEL: NO PAIN (0)

## 2021-10-18 NOTE — PROGRESS NOTES
Preoperative History and Physical    Chief complaint/indicated for surgery/planned surgery:    Marie is planning to undergo repeat low transverse  section and bilateral postpartum salpingectomy by Dr Leblanc and Юлия.. She has essential hypertension which is currently treated with labetalol and is currently stable. She has had 2 prior  sections. We are planning repeat  section at 37 weeks with tubal sterilization at the same time. She reports daily fetal movement and currently denies any headaches or blurred vision or leg edema or any other concerns.    Past Medical History:   Diagnosis Date     Anxiety      ASCUS with positive high risk HPV cervical 2008 (age 18) per Care Everywhere. NIL paps in '16, 17 &      Depressive disorder      Gestational diabetes mellitus (GDM) in third trimester, gestational diabetes method of control unspecified 2021     HTN (hypertension)      Current Outpatient Medications   Medication Sig Dispense Refill     acetone urine (KETOSTIX) test strip Test with first void of the day for 7 days.  If negative or trace for 7 days, reduce testing to one time per week. 50 strip 1     alcohol swab prep pads Use to swab area of injection/carolina as directed. 200 each 1     aspirin (ASA) 81 MG EC tablet Take 1 tablet (81 mg) by mouth daily 90 tablet 3     blood glucose (NO BRAND SPECIFIED) lancets standard Use to test blood sugar 4-6 times daily or as directed. 200 each 1     blood glucose (NO BRAND SPECIFIED) test strip Use to test blood sugar 4-6 times daily or as directed. 200 strip 1     blood glucose calibration (NO BRAND SPECIFIED) solution To accompany: Blood Glucose Monitor Brands: per insurance. 1 each 0     blood glucose monitoring (NO BRAND SPECIFIED) meter device kit Use to test blood sugar 4-6 times daily or as directed. Preferred blood glucose meter OR supplies to accompany: Blood Glucose Monitor Brands: per insurance. 1  kit 0     butalbital-acetaminophen-caffeine (ESGIC) -40 MG tablet Take 1 tablet by mouth every 4 hours as needed for headaches 20 tablet 0     docusate sodium (COLACE) 100 MG capsule Take 1 capsule (100 mg) by mouth 2 times daily as needed for constipation       EPINEPHrine (AUVI-Q) 0.3 MG/0.3ML injection 2-pack Inject 0.3 mLs (0.3 mg) into the muscle as needed for anaphylaxis 2 each 1     insulin  UNIT/ML injection Inject 10 Units Subcutaneous At Bedtime 15 mL 1     labetalol (NORMODYNE) 100 MG tablet Take 3 tablets (300 mg) by mouth 2 times daily 360 tablet 1     ondansetron (ZOFRAN-ODT) 4 MG ODT tab Take 1 tablet (4 mg) by mouth every 6 hours as needed for nausea or vomiting 10 tablet 1     Prenatal Vit-Fe Fumarate-FA (PNV FOLIC ACID + IRON) 27-1 MG TABS Take 1 tablet by mouth daily 90 tablet 3     sertraline (ZOLOFT) 25 MG tablet Take 1 tablet (25 mg) by mouth daily 90 tablet 1       There has been no recent use of OTC or herbal medications.   The patient denies any recent ASA or NSAID use.   The patient denies any recent steroid use within the last 6 months.   The patient denies any alcohol or drug use.     Allergies   Allergen Reactions     Contrast Dye Anaphylaxis     Bee Venom      Other reaction(s): Hives  Other reaction(s): Hives     History   Smoking Status     Former Smoker     Years: 5.00     Types: Cigarettes     Start date: 1/1/2007   Smokeless Tobacco     Never Used     Past Surgical History:   Procedure Laterality Date     C/SECTION, LOW TRANSVERSE      2015, 2016     HC REMOVAL OF TONSILS,<13 Y/O       Social History     Socioeconomic History     Marital status:      Spouse name: Not on file     Number of children: Not on file     Years of education: Not on file     Highest education level: Not on file   Occupational History     Not on file   Tobacco Use     Smoking status: Former Smoker     Years: 5.00     Types: Cigarettes     Start date: 1/1/2007     Smokeless tobacco: Never  "Used   Vaping Use     Vaping Use: Never used   Substance and Sexual Activity     Alcohol use: Yes     Drug use: No     Sexual activity: Yes     Partners: Male     Birth control/protection: I.U.D.   Other Topics Concern     Parent/sibling w/ CABG, MI or angioplasty before 65F 55M? No   Social History Narrative    3/2021  Lives in [place] with her , Spencer and their two sons.  They smoke cigarettes \"socially\"  (she is not during pregnancy).  No indoor cats/kittens.  No concerns about domestic violence.  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     Social Determinants of Health     Financial Resource Strain:      Difficulty of Paying Living Expenses:    Food Insecurity:      Worried About Running Out of Food in the Last Year:      Ran Out of Food in the Last Year:    Transportation Needs:      Lack of Transportation (Medical):      Lack of Transportation (Non-Medical):    Physical Activity:      Days of Exercise per Week:      Minutes of Exercise per Session:    Stress:      Feeling of Stress :    Social Connections:      Frequency of Communication with Friends and Family:      Frequency of Social Gatherings with Friends and Family:      Attends Restoration[employer] Services:      Active Member of Clubs or Organizations:      Attends Club or Organization Meetings:      Marital Status:    Intimate Partner Violence:      Fear of Current or Ex-Partner:      Emotionally Abused:      Physically Abused:      Sexually Abused:      Family History   Problem Relation Age of Onset     Hypertension Mother      Seizure Disorder Mother      Unknown/Adopted Mother      Hypertension Father      Heart Disease Father      Hyperlipidemia Father      Anxiety Disorder Father      Brain Tumor Paternal Grandfather      Prostate Cancer Paternal Grandfather      No Known Problems Sister      No Known Problems Son      No Known Problems Son      Diabetes Paternal Grandmother      No Known Problems Sister        There is no family history of anesthesia " reactions or malignant hyperthermia or psevdocholinestergse problem or porphyria.    Review Of Systems  Skin: negative  Eyes: negative  Ears/Nose/Throat: negative  Respiratory: No shortness of breath, dyspnea on exertion, cough, or hemoptysis  Cardiovascular: negative  Gastrointestinal: negative  Genitourinary: negative  Musculoskeletal: negative  Neurologic: negative  Psychiatric: negative  Hematologic/Lymphatic/Immunologic: negative  Endocrine: negative    Physical Exam:/86   Pulse 101   Temp 97.7  F (36.5  C) (Temporal)   Resp 20   Wt 117.5 kg (259 lb)   LMP 2021 (Exact Date)   SpO2 95%   BMI 40.57 kg/m      HEENT:    Sclerae and conjunctiva are normal.   Ear canals and TMs look normal.  Nasal mucosa is pink  - no polyps or masses seen.  Throat is unremarkable . Mucous membranes are moist.     Neck is supple, mobile, no adenopathy or masses palpable. The thyroid feels normal.   Normal range of motion noted.    Chest is clear to auscultation.  No wheezes, rales or rhonchi heard.  Cardiac exam is normal with s1, s2, no murmurs or adventitious sounds.Normal rate and rhythm is heard.     Abdomen is soft,  nondistended, No masses felt.No HSM. No guarding or rigidity or rebound   noted. Palpation reveals  no    tenderness   Normal bowel sounds heard.     Extremities are pink and there is no cyanosis and there is no edema. Pulses are physiologic.    The neurologic exam is grossly intact.Motor and sensory exams are grossly normal. Cranial nerves 2-12 are intact. Cerebellar exam is normal.         Other:  Labs: We will repeat preeclampsia labs tomorrow morning.    Assessment/Impression:  1.  Essential hypertension in pregnancy-history of 2 prior  sections.  Treated with labetalol.  We are planning repeat  section at 37 weeks.  She knows to call if she develops any headaches, blurred vision, decreased fetal movement or significant edema or other concerns.  She will have the labs drawn  tomorrow and has an ultrasound/biophysical profile scheduled for tomorrow.  She knows to be n.p.o. after midnight prior to her surgery and I gave her the Hibiclens scrub to do the day before and the morning of surgery.    2.Preoperative  Examination: The patient is at LOW   risk for general anesthesia for the proposed surgery.  We are anticipating a spinal anesthesia and tap block.        3.  Plan for  section-We discussed the plans for  section. She read over the consent form and signed it and we talked extensively about the risks. Risks include but are not limited to bleeding, infection, injury to bowel and bladder and other maternal organs as well as risk of injury to the fetus. If bleeding is excessive it might require transfusion or rarely a hysterectomy. She acknowledged understanding of the risks and signed the form. We did discuss the tubal ligation/salpingectomy  as an option at the same time and she does want that to be done.She knows to be NPO after midnight and to arrive two hours prior to surgery.She knows to have preop labs drawn ahead of time.  Covid test is already scheduled.    Recommendations:  Approval given for general anesthesia(if needed)  SPINAL anesthesia and TAP block are anticipated.  Medications are to be stopped before surgery-  Discontinue ASA and NSAIDS 5 days prior to surgery to reduce bleeding risk.        A total of 50 minutes were spent in today's visit with the patient in addition to the time spent charting on this patient today.      HERIBERTO Leblanc MD

## 2021-10-18 NOTE — PATIENT INSTRUCTIONS

## 2021-10-18 NOTE — TELEPHONE ENCOUNTER
"Gestational Diabetes Follow-up    Subjective/Objective:    Marie Yeboah sent in blood glucose log for review. Last date of communication was: 10-12-21.    Gestational diabetes is being managed with diet and activity    Taking diabetes medications: no    Estimated Date of Delivery: Nov 15, 2021    BG/Food Log:                  Assessment:    Ketones: Not noted.   Fasting blood glucoses: 83% in target.  After breakfast: 67% in target.  Before lunch: x% in target.  After lunch: 100% in target.  Before dinner: x% in target.  After dinner: 66% in target.    Message from patient: \"My number seem to be all over the place. Saturday and Sunday I was not feeling good so I did not eat much. Today for Breakfast I had toast and jelly. Wanted to make sure my stomach could handle some food.      I have not started the insulin. Tried to pick it up but they need a PA. I know how important it is but I only have 7 days, and I would hate for all the insulin to go to waste.\"    The jelly may have caused the higher BG after breakfast today.    Plan/Response:  Fasting BG's 83% in target range this past week without insulin because it needed a PA. Will reach out to OBGYN provider to see if they would still like her to get insulin for this last week of pregnancy.    Rebecca Hassan RN, [facility]      Any diabetes medication dose changes were made via the CDE Protocol and Collaborative Practice Agreement with the patient's OB/GYN provider. A copy of this encounter was shared with the provider.      "

## 2021-10-18 NOTE — NURSING NOTE
Chief Complaint   Patient presents with     Prenatal Care     Pre-Op Exam     10/25/2021      [id number]

## 2021-10-18 NOTE — TELEPHONE ENCOUNTER
Central Prior Authorization Team  Phone: [phone]    PA Initiation    Medication: Humulin N Kwikpen 100 unit/ml  Insurance Company: [employer] - Phone [phone] Fax [phone]  Pharmacy Filling the Rx: [address]   Filling Pharmacy Phone: [phone]  Filling Pharmacy Fax:    Start Date: 10/18/2021

## 2021-10-18 NOTE — H&P (VIEW-ONLY)
Preoperative History and Physical    Chief complaint/indicated for surgery/planned surgery:    Marie is planning to undergo repeat low transverse  section and bilateral postpartum salpingectomy by Dr Leblanc and Юлия.. She has essential hypertension which is currently treated with labetalol and is currently stable. She has had 2 prior  sections. We are planning repeat  section at 37 weeks with tubal sterilization at the same time. She reports daily fetal movement and currently denies any headaches or blurred vision or leg edema or any other concerns.    Past Medical History:   Diagnosis Date     Anxiety      ASCUS with positive high risk HPV cervical 2008 (age 18) per Care Everywhere. NIL paps in '16, 18 &      Depressive disorder      Gestational diabetes mellitus (GDM) in third trimester, gestational diabetes method of control unspecified 2021     HTN (hypertension)      Current Outpatient Medications   Medication Sig Dispense Refill     acetone urine (KETOSTIX) test strip Test with first void of the day for 7 days.  If negative or trace for 7 days, reduce testing to one time per week. 50 strip 1     alcohol swab prep pads Use to swab area of injection/carolina as directed. 200 each 1     aspirin (ASA) 81 MG EC tablet Take 1 tablet (81 mg) by mouth daily 90 tablet 3     blood glucose (NO BRAND SPECIFIED) lancets standard Use to test blood sugar 4-6 times daily or as directed. 200 each 1     blood glucose (NO BRAND SPECIFIED) test strip Use to test blood sugar 4-6 times daily or as directed. 200 strip 1     blood glucose calibration (NO BRAND SPECIFIED) solution To accompany: Blood Glucose Monitor Brands: per insurance. 1 each 0     blood glucose monitoring (NO BRAND SPECIFIED) meter device kit Use to test blood sugar 4-6 times daily or as directed. Preferred blood glucose meter OR supplies to accompany: Blood Glucose Monitor Brands: per insurance. 1  kit 0     butalbital-acetaminophen-caffeine (ESGIC) -40 MG tablet Take 1 tablet by mouth every 4 hours as needed for headaches 20 tablet 0     docusate sodium (COLACE) 100 MG capsule Take 1 capsule (100 mg) by mouth 2 times daily as needed for constipation       EPINEPHrine (AUVI-Q) 0.3 MG/0.3ML injection 2-pack Inject 0.3 mLs (0.3 mg) into the muscle as needed for anaphylaxis 2 each 1     insulin  UNIT/ML injection Inject 10 Units Subcutaneous At Bedtime 15 mL 1     labetalol (NORMODYNE) 100 MG tablet Take 3 tablets (300 mg) by mouth 2 times daily 360 tablet 1     ondansetron (ZOFRAN-ODT) 4 MG ODT tab Take 1 tablet (4 mg) by mouth every 6 hours as needed for nausea or vomiting 10 tablet 1     Prenatal Vit-Fe Fumarate-FA (PNV FOLIC ACID + IRON) 27-1 MG TABS Take 1 tablet by mouth daily 90 tablet 3     sertraline (ZOLOFT) 25 MG tablet Take 1 tablet (25 mg) by mouth daily 90 tablet 1       There has been no recent use of OTC or herbal medications.   The patient denies any recent ASA or NSAID use.   The patient denies any recent steroid use within the last 6 months.   The patient denies any alcohol or drug use.     Allergies   Allergen Reactions     Contrast Dye Anaphylaxis     Bee Venom      Other reaction(s): Hives  Other reaction(s): Hives     History   Smoking Status     Former Smoker     Years: 5.00     Types: Cigarettes     Start date: 1/1/2007   Smokeless Tobacco     Never Used     Past Surgical History:   Procedure Laterality Date     C/SECTION, LOW TRANSVERSE      2015, 2016     HC REMOVAL OF TONSILS,<11 Y/O       Social History     Socioeconomic History     Marital status:      Spouse name: Not on file     Number of children: Not on file     Years of education: Not on file     Highest education level: Not on file   Occupational History     Not on file   Tobacco Use     Smoking status: Former Smoker     Years: 5.00     Types: Cigarettes     Start date: 1/1/2007     Smokeless tobacco: Never  "Used   Vaping Use     Vaping Use: Never used   Substance and Sexual Activity     Alcohol use: Yes     Drug use: No     Sexual activity: Yes     Partners: Male     Birth control/protection: I.U.D.   Other Topics Concern     Parent/sibling w/ CABG, MI or angioplasty before 65F 55M? No   Social History Narrative    3/2021  Lives in [place] with her , Spencer and their two sons.  They smoke cigarettes \"socially\"  (she is not during pregnancy).  No indoor cats/kittens.  No concerns about domestic violence.  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     Social Determinants of Health     Financial Resource Strain:      Difficulty of Paying Living Expenses:    Food Insecurity:      Worried About Running Out of Food in the Last Year:      Ran Out of Food in the Last Year:    Transportation Needs:      Lack of Transportation (Medical):      Lack of Transportation (Non-Medical):    Physical Activity:      Days of Exercise per Week:      Minutes of Exercise per Session:    Stress:      Feeling of Stress :    Social Connections:      Frequency of Communication with Friends and Family:      Frequency of Social Gatherings with Friends and Family:      Attends Muslim Services:      Active Member of Clubs or Organizations:      Attends Club or Organization Meetings:      Marital Status:    Intimate Partner Violence:      Fear of Current or Ex-Partner:      Emotionally Abused:      Physically Abused:      Sexually Abused:      Family History   Problem Relation Age of Onset     Hypertension Mother      Seizure Disorder Mother      Unknown/Adopted Mother      Hypertension Father      Heart Disease Father      Hyperlipidemia Father      Anxiety Disorder Father      Brain Tumor Paternal Grandfather      Prostate Cancer Paternal Grandfather      No Known Problems Sister      No Known Problems Son      No Known Problems Son      Diabetes Paternal Grandmother      No Known Problems Sister        There is no family history of anesthesia " reactions or malignant hyperthermia or psevdocholinestergse problem or porphyria.    Review Of Systems  Skin: negative  Eyes: negative  Ears/Nose/Throat: negative  Respiratory: No shortness of breath, dyspnea on exertion, cough, or hemoptysis  Cardiovascular: negative  Gastrointestinal: negative  Genitourinary: negative  Musculoskeletal: negative  Neurologic: negative  Psychiatric: negative  Hematologic/Lymphatic/Immunologic: negative  Endocrine: negative    Physical Exam:/86   Pulse 101   Temp 97.7  F (36.5  C) (Temporal)   Resp 20   Wt 117.5 kg (259 lb)   LMP 2021 (Exact Date)   SpO2 95%   BMI 40.57 kg/m      HEENT:    Sclerae and conjunctiva are normal.   Ear canals and TMs look normal.  Nasal mucosa is pink  - no polyps or masses seen.  Throat is unremarkable . Mucous membranes are moist.     Neck is supple, mobile, no adenopathy or masses palpable. The thyroid feels normal.   Normal range of motion noted.    Chest is clear to auscultation.  No wheezes, rales or rhonchi heard.  Cardiac exam is normal with s1, s2, no murmurs or adventitious sounds.Normal rate and rhythm is heard.     Abdomen is soft,  nondistended, No masses felt.No HSM. No guarding or rigidity or rebound   noted. Palpation reveals  no    tenderness   Normal bowel sounds heard.     Extremities are pink and there is no cyanosis and there is no edema. Pulses are physiologic.    The neurologic exam is grossly intact.Motor and sensory exams are grossly normal. Cranial nerves 2-12 are intact. Cerebellar exam is normal.         Other:  Labs: We will repeat preeclampsia labs tomorrow morning.    Assessment/Impression:  1.  Essential hypertension in pregnancy-history of 2 prior  sections.  Treated with labetalol.  We are planning repeat  section at 37 weeks.  She knows to call if she develops any headaches, blurred vision, decreased fetal movement or significant edema or other concerns.  She will have the labs drawn  tomorrow and has an ultrasound/biophysical profile scheduled for tomorrow.  She knows to be n.p.o. after midnight prior to her surgery and I gave her the Hibiclens scrub to do the day before and the morning of surgery.    2.Preoperative  Examination: The patient is at LOW   risk for general anesthesia for the proposed surgery.  We are anticipating a spinal anesthesia and tap block.        3.  Plan for  section-We discussed the plans for  section. She read over the consent form and signed it and we talked extensively about the risks. Risks include but are not limited to bleeding, infection, injury to bowel and bladder and other maternal organs as well as risk of injury to the fetus. If bleeding is excessive it might require transfusion or rarely a hysterectomy. She acknowledged understanding of the risks and signed the form. We did discuss the tubal ligation/salpingectomy  as an option at the same time and she does want that to be done.She knows to be NPO after midnight and to arrive two hours prior to surgery.She knows to have preop labs drawn ahead of time.  Covid test is already scheduled.    Recommendations:  Approval given for general anesthesia(if needed)  SPINAL anesthesia and TAP block are anticipated.  Medications are to be stopped before surgery-  Discontinue ASA and NSAIDS 5 days prior to surgery to reduce bleeding risk.        A total of 50 minutes were spent in today's visit with the patient in addition to the time spent charting on this patient today.      HERIBERTO Leblanc MD

## 2021-10-19 ENCOUNTER — LAB (OUTPATIENT)
Dept: LAB | Facility: CLINIC | Age: 31
End: 2021-10-19
Payer: COMMERCIAL

## 2021-10-19 ENCOUNTER — HOSPITAL ENCOUNTER (OUTPATIENT)
Dept: ULTRASOUND IMAGING | Facility: CLINIC | Age: 31
Discharge: HOME | End: 2021-10-19
Attending: FAMILY MEDICINE | Admitting: FAMILY MEDICINE
Payer: COMMERCIAL

## 2021-10-19 DIAGNOSIS — Z01.818 PREOP GENERAL PHYSICAL EXAM: ICD-10-CM

## 2021-10-19 DIAGNOSIS — I10 HYPERTENSION, GOAL BELOW 140/90: ICD-10-CM

## 2021-10-19 DIAGNOSIS — O09.299 HISTORY OF PRE-ECLAMPSIA IN PRIOR PREGNANCY, CURRENTLY PREGNANT: ICD-10-CM

## 2021-10-19 DIAGNOSIS — O09.892 SUPERVISION OF OTHER HIGH RISK PREGNANCIES, SECOND TRIMESTER: ICD-10-CM

## 2021-10-19 LAB
ALT SERPL W P-5'-P-CCNC: 20 U/L (ref 0–50)
AST SERPL W P-5'-P-CCNC: 10 U/L (ref 0–45)
CREAT UR-MCNC: 175 MG/DL
ERYTHROCYTE [DISTWIDTH] IN BLOOD BY AUTOMATED COUNT: 13.3 % (ref 10–15)
HCT VFR BLD AUTO: 37.4 % (ref 35–47)
HGB BLD-MCNC: 12.7 G/DL (ref 11.7–15.7)
MCH RBC QN AUTO: 28.3 PG (ref 26.5–33)
MCHC RBC AUTO-ENTMCNC: 34 G/DL (ref 31.5–36.5)
MCV RBC AUTO: 84 FL (ref 78–100)
PLATELET # BLD AUTO: 236 10E3/UL (ref 150–450)
PROT UR-MCNC: 0.27 G/L
PROT/CREAT 24H UR: 0.15 G/G CR (ref 0–0.2)
RBC # BLD AUTO: 4.48 10E6/UL (ref 3.8–5.2)
WBC # BLD AUTO: 8.7 10E3/UL (ref 4–11)

## 2021-10-19 PROCEDURE — 36415 COLL VENOUS BLD VENIPUNCTURE: CPT

## 2021-10-19 PROCEDURE — 76816 OB US FOLLOW-UP PER FETUS: CPT

## 2021-10-19 PROCEDURE — 84156 ASSAY OF PROTEIN URINE: CPT

## 2021-10-19 PROCEDURE — 84460 ALANINE AMINO (ALT) (SGPT): CPT

## 2021-10-19 PROCEDURE — 76819 FETAL BIOPHYS PROFIL W/O NST: CPT

## 2021-10-19 PROCEDURE — 84450 TRANSFERASE (AST) (SGOT): CPT

## 2021-10-19 PROCEDURE — 85027 COMPLETE CBC AUTOMATED: CPT

## 2021-10-19 RX ORDER — GLUCOSAMINE HCL/CHONDROITIN SU 500-400 MG
CAPSULE ORAL
Qty: 100 EACH | Refills: 3 | Status: SHIPPED | OUTPATIENT
Start: 2021-10-19 | End: 2022-01-21

## 2021-10-19 RX ORDER — BLOOD SUGAR DIAGNOSTIC
STRIP MISCELLANEOUS
Qty: 100 EACH | Refills: 0 | Status: SHIPPED | OUTPATIENT
Start: 2021-10-19 | End: 2021-10-22

## 2021-10-19 NOTE — TELEPHONE ENCOUNTER
PRIOR AUTHORIZATION DENIED    Medication: Humulin N Kwikpen 100 unit/ml- DENIED     Denial Date: 10/18/2021    Denial Rational: Patient must have a history of trial & failure to 1 formulary alternative or have a contraindication or intolerance to the formulary alternatives:   NOVOLIN N FLEXPEN,  NOVOLIN R,  NOVOLIN 70/30    Appeal Information: If provider would like to appeal please provide a letter of medical necessity.

## 2021-10-20 ENCOUNTER — MYC MEDICAL ADVICE (OUTPATIENT)
Dept: FAMILY MEDICINE | Facility: CLINIC | Age: 31
End: 2021-10-20

## 2021-10-20 ENCOUNTER — TELEPHONE (OUTPATIENT)
Dept: FAMILY MEDICINE | Facility: CLINIC | Age: 31
End: 2021-10-20

## 2021-10-20 NOTE — TELEPHONE ENCOUNTER
The only thing you can do is appeal it.  You will have to write an appeal letter and then route this back to me when done.

## 2021-10-20 NOTE — TELEPHONE ENCOUNTER
This is extremely frustrated that she has not been started on this yet. Please contact pharmacy ASAP and find out how to get this approved. She should have been on this long ago.   Rhoda Redman MD

## 2021-10-20 NOTE — TELEPHONE ENCOUNTER
Gestational Diabetes Follow-up    Subjective/Objective:    Marie Yeboah sent in blood glucose log for review. Last date of communication was: 10-18-21.    Gestational diabetes is being managed with diet and activity    Taking diabetes medications: no    Estimated Date of Delivery: Nov 15, 2021    BG/Food Log:           Assessment:    Ketones: Negative.   Fasting blood glucoses: 100% in target.  After breakfast: 100% in target.  Before lunch: x% in target.  After lunch: 100% in target.  Before dinner: x% in target.  After dinner: 100% in target.    Dr. Redman wants pt to be 80% in target in order to stay off insulin. Since Monday pt has been 100% in goal with diet only. Will recommend she send in records again on Friday.  is scheduled for .    Plan/Response:  No changes in the patient's current treatment plan.  Follow-up on Friday.  MyChart response sent.    Rebecca Hassan RN, [facility]        Any diabetes medication dose changes were made via the CDE Protocol and Collaborative Practice Agreement with the patient's OB/GYN provider. A copy of this encounter was shared with the provider.

## 2021-10-21 NOTE — RESULT ENCOUNTER NOTE
Marie, your baby scored 8 out of 8 points on the ultrasound again. This is very reassuring. I found out today you're having difficulty getting insulin so not started on it. I'll call you tomorrow to discuss.   Rhoda Redman MD

## 2021-10-22 ENCOUNTER — MYC MEDICAL ADVICE (OUTPATIENT)
Dept: EDUCATION SERVICES | Facility: OTHER | Age: 31
End: 2021-10-22

## 2021-10-22 ENCOUNTER — HOSPITAL ENCOUNTER (OUTPATIENT)
Dept: ULTRASOUND IMAGING | Facility: CLINIC | Age: 31
Discharge: HOME | End: 2021-10-22
Attending: FAMILY MEDICINE | Admitting: FAMILY MEDICINE
Payer: COMMERCIAL

## 2021-10-22 ENCOUNTER — LAB (OUTPATIENT)
Dept: LAB | Facility: CLINIC | Age: 31
End: 2021-10-22
Payer: COMMERCIAL

## 2021-10-22 DIAGNOSIS — O09.892 SUPERVISION OF OTHER HIGH RISK PREGNANCIES, SECOND TRIMESTER: ICD-10-CM

## 2021-10-22 DIAGNOSIS — Z11.59 ENCOUNTER FOR SCREENING FOR OTHER VIRAL DISEASES: ICD-10-CM

## 2021-10-22 DIAGNOSIS — O09.299 HISTORY OF PRE-ECLAMPSIA IN PRIOR PREGNANCY, CURRENTLY PREGNANT: ICD-10-CM

## 2021-10-22 PROCEDURE — 76819 FETAL BIOPHYS PROFIL W/O NST: CPT

## 2021-10-22 PROCEDURE — U0003 INFECTIOUS AGENT DETECTION BY NUCLEIC ACID (DNA OR RNA); SEVERE ACUTE RESPIRATORY SYNDROME CORONAVIRUS 2 (SARS-COV-2) (CORONAVIRUS DISEASE [COVID-19]), AMPLIFIED PROBE TECHNIQUE, MAKING USE OF HIGH THROUGHPUT TECHNOLOGIES AS DESCRIBED BY CMS-2020-01-R: HCPCS

## 2021-10-22 PROCEDURE — U0005 INFEC AGEN DETEC AMPLI PROBE: HCPCS

## 2021-10-22 NOTE — TELEPHONE ENCOUNTER
Gestational Diabetes Follow-up    Subjective/Objective:    Marie Yeboah sent in blood glucose log for review. Last date of communication was: 10-18-21.    Gestational diabetes is being managed with diet and activity    Taking diabetes medications: no    Estimated Date of Delivery: Nov 15, 2021    BG/Food Log:               Assessment:    Ketones: Negative.   Fasting blood glucoses: 100% in target.  After breakfast: 100% in target.  Before lunch: x% in target.  After lunch: 100% in target.  Before dinner: x% in target.  After dinner: 100% in target.    Plan/Response:  No changes in the patient's current treatment plan.  No further Follow-up planned as pt is schedule for  on .  Carrier IQ message sent.    Rebecca Hassan RN, [facility]      Any diabetes medication dose changes were made via the CDE Protocol and Collaborative Practice Agreement with the patient's OB/GYN provider. A copy of this encounter was shared with the provider.

## 2021-10-23 LAB — SARS-COV-2 RNA RESP QL NAA+PROBE: NEGATIVE

## 2021-10-25 ENCOUNTER — ANESTHESIA EVENT (OUTPATIENT)
Dept: OBGYN | Facility: CLINIC | Age: 31
End: 2021-10-25
Payer: COMMERCIAL

## 2021-10-25 ENCOUNTER — ANESTHESIA (OUTPATIENT)
Dept: OBGYN | Facility: CLINIC | Age: 31
End: 2021-10-25
Payer: COMMERCIAL

## 2021-10-25 ENCOUNTER — HOSPITAL ENCOUNTER (INPATIENT)
Facility: CLINIC | Age: 31
LOS: 2 days | Discharge: HOME | End: 2021-10-27
Attending: OBSTETRICS & GYNECOLOGY | Admitting: OBSTETRICS & GYNECOLOGY
Payer: COMMERCIAL

## 2021-10-25 DIAGNOSIS — Z98.891 H/O CESAREAN SECTION: ICD-10-CM

## 2021-10-25 DIAGNOSIS — I10 HYPERTENSION, GOAL BELOW 140/90: ICD-10-CM

## 2021-10-25 DIAGNOSIS — Z98.891 S/P REPEAT LOW TRANSVERSE C-SECTION: ICD-10-CM

## 2021-10-25 PROBLEM — R76.8 RED BLOOD CELL ANTIBODY POSITIVE: Status: ACTIVE | Noted: 2021-10-25

## 2021-10-25 LAB
ABO/RH(D): ABNORMAL
ABO/RH(D): NORMAL
ANTIBODY ID: NORMAL
ANTIBODY SCREEN: POSITIVE
C AG RBC QL: NORMAL
GLUCOSE BLDC GLUCOMTR-MCNC: 76 MG/DL (ref 70–99)
GLUCOSE BLDC GLUCOMTR-MCNC: 82 MG/DL (ref 70–99)
HGB BLD-MCNC: 12.4 G/DL (ref 11.7–15.7)
SPECIMEN EXPIRATION DATE: ABNORMAL
SPECIMEN EXPIRATION DATE: NORMAL
T PALLIDUM AB SER QL: NONREACTIVE

## 2021-10-25 PROCEDURE — 258N000003 HC RX IP 258 OP 636: Performed by: NURSE ANESTHETIST, CERTIFIED REGISTERED

## 2021-10-25 PROCEDURE — 258N000003 HC RX IP 258 OP 636: Performed by: OBSTETRICS & GYNECOLOGY

## 2021-10-25 PROCEDURE — 120N000001 HC R&B MED SURG/OB

## 2021-10-25 PROCEDURE — 85018 HEMOGLOBIN: CPT | Performed by: OBSTETRICS & GYNECOLOGY

## 2021-10-25 PROCEDURE — 360N000076 HC SURGERY LEVEL 3, PER MIN: Performed by: OBSTETRICS & GYNECOLOGY

## 2021-10-25 PROCEDURE — 250N000009 HC RX 250: Performed by: FAMILY MEDICINE

## 2021-10-25 PROCEDURE — 250N000011 HC RX IP 250 OP 636: Performed by: NURSE ANESTHETIST, CERTIFIED REGISTERED

## 2021-10-25 PROCEDURE — 250N000011 HC RX IP 250 OP 636: Performed by: OBSTETRICS & GYNECOLOGY

## 2021-10-25 PROCEDURE — 58611 LIGATE OVIDUCT(S) ADD-ON: CPT | Performed by: OBSTETRICS & GYNECOLOGY

## 2021-10-25 PROCEDURE — 86900 BLOOD TYPING SEROLOGIC ABO: CPT | Performed by: OBSTETRICS & GYNECOLOGY

## 2021-10-25 PROCEDURE — 250N000009 HC RX 250: Performed by: OBSTETRICS & GYNECOLOGY

## 2021-10-25 PROCEDURE — 370N000017 HC ANESTHESIA TECHNICAL FEE, PER MIN: Performed by: OBSTETRICS & GYNECOLOGY

## 2021-10-25 PROCEDURE — 250N000013 HC RX MED GY IP 250 OP 250 PS 637: Performed by: OBSTETRICS & GYNECOLOGY

## 2021-10-25 PROCEDURE — 88302 TISSUE EXAM BY PATHOLOGIST: CPT | Mod: TC | Performed by: OBSTETRICS & GYNECOLOGY

## 2021-10-25 PROCEDURE — 271N000001 HC OR GENERAL SUPPLY NON-STERILE: Performed by: OBSTETRICS & GYNECOLOGY

## 2021-10-25 PROCEDURE — 59514 CESAREAN DELIVERY ONLY: CPT | Mod: 80 | Performed by: FAMILY MEDICINE

## 2021-10-25 PROCEDURE — 59510 CESAREAN DELIVERY: CPT | Performed by: OBSTETRICS & GYNECOLOGY

## 2021-10-25 PROCEDURE — 272N000001 HC OR GENERAL SUPPLY STERILE: Performed by: OBSTETRICS & GYNECOLOGY

## 2021-10-25 PROCEDURE — 86905 BLOOD TYPING RBC ANTIGENS: CPT | Performed by: OBSTETRICS & GYNECOLOGY

## 2021-10-25 PROCEDURE — 710N000010 HC RECOVERY PHASE 1, LEVEL 2, PER MIN: Performed by: OBSTETRICS & GYNECOLOGY

## 2021-10-25 PROCEDURE — 86870 RBC ANTIBODY IDENTIFICATION: CPT | Performed by: OBSTETRICS & GYNECOLOGY

## 2021-10-25 PROCEDURE — 86780 TREPONEMA PALLIDUM: CPT | Performed by: OBSTETRICS & GYNECOLOGY

## 2021-10-25 PROCEDURE — 86901 BLOOD TYPING SEROLOGIC RH(D): CPT | Performed by: OBSTETRICS & GYNECOLOGY

## 2021-10-25 RX ORDER — DEXTROSE MONOHYDRATE 25 G/50ML
25-50 INJECTION, SOLUTION INTRAVENOUS
Status: DISCONTINUED | OUTPATIENT
Start: 2021-10-25 | End: 2021-10-27 | Stop reason: HOSPADM

## 2021-10-25 RX ORDER — PROCHLORPERAZINE 25 MG
25 SUPPOSITORY, RECTAL RECTAL EVERY 12 HOURS PRN
Status: DISCONTINUED | OUTPATIENT
Start: 2021-10-25 | End: 2021-10-27 | Stop reason: HOSPADM

## 2021-10-25 RX ORDER — AMOXICILLIN 250 MG
1 CAPSULE ORAL 2 TIMES DAILY
Status: DISCONTINUED | OUTPATIENT
Start: 2021-10-25 | End: 2021-10-27 | Stop reason: HOSPADM

## 2021-10-25 RX ORDER — PROCHLORPERAZINE MALEATE 5 MG
10 TABLET ORAL EVERY 6 HOURS PRN
Status: DISCONTINUED | OUTPATIENT
Start: 2021-10-25 | End: 2021-10-27 | Stop reason: HOSPADM

## 2021-10-25 RX ORDER — OXYCODONE HYDROCHLORIDE 5 MG/1
5 TABLET ORAL EVERY 4 HOURS PRN
Status: DISCONTINUED | OUTPATIENT
Start: 2021-10-25 | End: 2021-10-25 | Stop reason: CLARIF

## 2021-10-25 RX ORDER — MODIFIED LANOLIN
OINTMENT (GRAM) TOPICAL
Status: DISCONTINUED | OUTPATIENT
Start: 2021-10-25 | End: 2021-10-27 | Stop reason: HOSPADM

## 2021-10-25 RX ORDER — SIMETHICONE 80 MG
80 TABLET,CHEWABLE ORAL 4 TIMES DAILY PRN
Status: DISCONTINUED | OUTPATIENT
Start: 2021-10-25 | End: 2021-10-27 | Stop reason: HOSPADM

## 2021-10-25 RX ORDER — CARBOPROST TROMETHAMINE 250 UG/ML
250 INJECTION, SOLUTION INTRAMUSCULAR
Status: DISCONTINUED | OUTPATIENT
Start: 2021-10-25 | End: 2021-10-27 | Stop reason: HOSPADM

## 2021-10-25 RX ORDER — OXYTOCIN 10 [USP'U]/ML
INJECTION, SOLUTION INTRAMUSCULAR; INTRAVENOUS
Status: DISCONTINUED
Start: 2021-10-25 | End: 2021-10-25 | Stop reason: HOSPADM

## 2021-10-25 RX ORDER — METHYLERGONOVINE MALEATE 0.2 MG/ML
200 INJECTION INTRAVENOUS
Status: DISCONTINUED | OUTPATIENT
Start: 2021-10-25 | End: 2021-10-25

## 2021-10-25 RX ORDER — SCOLOPAMINE TRANSDERMAL SYSTEM 1 MG/1
1 PATCH, EXTENDED RELEASE TRANSDERMAL
Status: DISCONTINUED | OUTPATIENT
Start: 2021-10-25 | End: 2021-10-27 | Stop reason: HOSPADM

## 2021-10-25 RX ORDER — NALOXONE HYDROCHLORIDE 0.4 MG/ML
0.2 INJECTION, SOLUTION INTRAMUSCULAR; INTRAVENOUS; SUBCUTANEOUS
Status: DISCONTINUED | OUTPATIENT
Start: 2021-10-25 | End: 2021-10-27 | Stop reason: HOSPADM

## 2021-10-25 RX ORDER — ONDANSETRON 2 MG/ML
4 INJECTION INTRAMUSCULAR; INTRAVENOUS EVERY 4 HOURS PRN
Status: DISCONTINUED | OUTPATIENT
Start: 2021-10-25 | End: 2021-10-25

## 2021-10-25 RX ORDER — LIDOCAINE 40 MG/G
CREAM TOPICAL
Status: DISCONTINUED | OUTPATIENT
Start: 2021-10-25 | End: 2021-10-25

## 2021-10-25 RX ORDER — LIDOCAINE 40 MG/G
CREAM TOPICAL
Status: DISCONTINUED | OUTPATIENT
Start: 2021-10-25 | End: 2021-10-27 | Stop reason: HOSPADM

## 2021-10-25 RX ORDER — SODIUM CHLORIDE, SODIUM LACTATE, POTASSIUM CHLORIDE, CALCIUM CHLORIDE 600; 310; 30; 20 MG/100ML; MG/100ML; MG/100ML; MG/100ML
INJECTION, SOLUTION INTRAVENOUS CONTINUOUS
Status: DISCONTINUED | OUTPATIENT
Start: 2021-10-25 | End: 2021-10-25 | Stop reason: CLARIF

## 2021-10-25 RX ORDER — BUPIVACAINE HYDROCHLORIDE 7.5 MG/ML
INJECTION, SOLUTION INTRASPINAL PRN
Status: DISCONTINUED | OUTPATIENT
Start: 2021-10-25 | End: 2021-10-25

## 2021-10-25 RX ORDER — PHENYLEPHRINE HYDROCHLORIDE 10 MG/ML
INJECTION INTRAVENOUS PRN
Status: DISCONTINUED | OUTPATIENT
Start: 2021-10-25 | End: 2021-10-25

## 2021-10-25 RX ORDER — MISOPROSTOL 200 UG/1
400 TABLET ORAL
Status: DISCONTINUED | OUTPATIENT
Start: 2021-10-25 | End: 2021-10-25

## 2021-10-25 RX ORDER — OXYTOCIN/0.9 % SODIUM CHLORIDE 30/500 ML
100-340 PLASTIC BAG, INJECTION (ML) INTRAVENOUS CONTINUOUS PRN
Status: DISCONTINUED | OUTPATIENT
Start: 2021-10-25 | End: 2021-10-25

## 2021-10-25 RX ORDER — OXYTOCIN/0.9 % SODIUM CHLORIDE 30/500 ML
340 PLASTIC BAG, INJECTION (ML) INTRAVENOUS CONTINUOUS PRN
Status: DISCONTINUED | OUTPATIENT
Start: 2021-10-25 | End: 2021-10-27 | Stop reason: HOSPADM

## 2021-10-25 RX ORDER — IBUPROFEN 800 MG/1
800 TABLET, FILM COATED ORAL EVERY 6 HOURS
Status: DISCONTINUED | OUTPATIENT
Start: 2021-10-26 | End: 2021-10-27 | Stop reason: HOSPADM

## 2021-10-25 RX ORDER — FENTANYL CITRATE-0.9 % NACL/PF 10 MCG/ML
PLASTIC BAG, INJECTION (ML) INTRAVENOUS CONTINUOUS PRN
Status: DISCONTINUED | OUTPATIENT
Start: 2021-10-25 | End: 2021-10-25

## 2021-10-25 RX ORDER — NALOXONE HYDROCHLORIDE 0.4 MG/ML
0.4 INJECTION, SOLUTION INTRAMUSCULAR; INTRAVENOUS; SUBCUTANEOUS
Status: DISCONTINUED | OUTPATIENT
Start: 2021-10-25 | End: 2021-10-25

## 2021-10-25 RX ORDER — DEXTROSE, SODIUM CHLORIDE, SODIUM LACTATE, POTASSIUM CHLORIDE, AND CALCIUM CHLORIDE 5; .6; .31; .03; .02 G/100ML; G/100ML; G/100ML; G/100ML; G/100ML
INJECTION, SOLUTION INTRAVENOUS CONTINUOUS
Status: DISCONTINUED | OUTPATIENT
Start: 2021-10-25 | End: 2021-10-26 | Stop reason: CLARIF

## 2021-10-25 RX ORDER — LIDOCAINE HYDROCHLORIDE 10 MG/ML
INJECTION, SOLUTION EPIDURAL; INFILTRATION; INTRACAUDAL; PERINEURAL
Status: DISCONTINUED
Start: 2021-10-25 | End: 2021-10-25 | Stop reason: HOSPADM

## 2021-10-25 RX ORDER — OXYCODONE HYDROCHLORIDE 5 MG/1
5 TABLET ORAL EVERY 4 HOURS PRN
Status: DISCONTINUED | OUTPATIENT
Start: 2021-10-25 | End: 2021-10-27 | Stop reason: HOSPADM

## 2021-10-25 RX ORDER — NALOXONE HYDROCHLORIDE 0.4 MG/ML
0.2 INJECTION, SOLUTION INTRAMUSCULAR; INTRAVENOUS; SUBCUTANEOUS
Status: DISCONTINUED | OUTPATIENT
Start: 2021-10-25 | End: 2021-10-25

## 2021-10-25 RX ORDER — LABETALOL 100 MG/1
200 TABLET, FILM COATED ORAL EVERY 12 HOURS SCHEDULED
Status: DISCONTINUED | OUTPATIENT
Start: 2021-10-25 | End: 2021-10-25

## 2021-10-25 RX ORDER — CEFAZOLIN SODIUM 2 G/100ML
2 INJECTION, SOLUTION INTRAVENOUS SEE ADMIN INSTRUCTIONS
Status: DISCONTINUED | OUTPATIENT
Start: 2021-10-25 | End: 2021-10-25

## 2021-10-25 RX ORDER — CARBOPROST TROMETHAMINE 250 UG/ML
250 INJECTION, SOLUTION INTRAMUSCULAR
Status: DISCONTINUED | OUTPATIENT
Start: 2021-10-25 | End: 2021-10-25

## 2021-10-25 RX ORDER — METOCLOPRAMIDE HYDROCHLORIDE 5 MG/ML
10 INJECTION INTRAMUSCULAR; INTRAVENOUS EVERY 6 HOURS PRN
Status: DISCONTINUED | OUTPATIENT
Start: 2021-10-25 | End: 2021-10-27 | Stop reason: HOSPADM

## 2021-10-25 RX ORDER — FENTANYL CITRATE 50 UG/ML
25 INJECTION, SOLUTION INTRAMUSCULAR; INTRAVENOUS EVERY 5 MIN PRN
Status: DISCONTINUED | OUTPATIENT
Start: 2021-10-25 | End: 2021-10-25 | Stop reason: CLARIF

## 2021-10-25 RX ORDER — OXYTOCIN 10 [USP'U]/ML
10 INJECTION, SOLUTION INTRAMUSCULAR; INTRAVENOUS
Status: DISCONTINUED | OUTPATIENT
Start: 2021-10-25 | End: 2021-10-27 | Stop reason: HOSPADM

## 2021-10-25 RX ORDER — AMOXICILLIN 250 MG
2 CAPSULE ORAL 2 TIMES DAILY
Status: DISCONTINUED | OUTPATIENT
Start: 2021-10-25 | End: 2021-10-27 | Stop reason: HOSPADM

## 2021-10-25 RX ORDER — ACETAMINOPHEN 325 MG/1
975 TABLET ORAL EVERY 6 HOURS
Status: DISCONTINUED | OUTPATIENT
Start: 2021-10-25 | End: 2021-10-27 | Stop reason: HOSPADM

## 2021-10-25 RX ORDER — ONDANSETRON 2 MG/ML
4 INJECTION INTRAMUSCULAR; INTRAVENOUS EVERY 6 HOURS PRN
Status: DISCONTINUED | OUTPATIENT
Start: 2021-10-25 | End: 2021-10-27 | Stop reason: HOSPADM

## 2021-10-25 RX ORDER — OXYTOCIN/0.9 % SODIUM CHLORIDE 30/500 ML
100 PLASTIC BAG, INJECTION (ML) INTRAVENOUS CONTINUOUS
Status: ACTIVE | OUTPATIENT
Start: 2021-10-25 | End: 2021-10-25

## 2021-10-25 RX ORDER — CEFAZOLIN SODIUM 2 G/100ML
2 INJECTION, SOLUTION INTRAVENOUS
Status: COMPLETED | OUTPATIENT
Start: 2021-10-25 | End: 2021-10-25

## 2021-10-25 RX ORDER — MISOPROSTOL 200 UG/1
400 TABLET ORAL
Status: DISCONTINUED | OUTPATIENT
Start: 2021-10-25 | End: 2021-10-27 | Stop reason: HOSPADM

## 2021-10-25 RX ORDER — OXYTOCIN 10 [USP'U]/ML
10 INJECTION, SOLUTION INTRAMUSCULAR; INTRAVENOUS
Status: DISCONTINUED | OUTPATIENT
Start: 2021-10-25 | End: 2021-10-25

## 2021-10-25 RX ORDER — OXYCODONE HYDROCHLORIDE 5 MG/1
5 TABLET ORAL
Status: DISCONTINUED | OUTPATIENT
Start: 2021-10-25 | End: 2021-10-25

## 2021-10-25 RX ORDER — HYDROCORTISONE 2.5 %
CREAM (GRAM) TOPICAL 3 TIMES DAILY PRN
Status: DISCONTINUED | OUTPATIENT
Start: 2021-10-25 | End: 2021-10-26

## 2021-10-25 RX ORDER — KETOROLAC TROMETHAMINE 30 MG/ML
30 INJECTION, SOLUTION INTRAMUSCULAR; INTRAVENOUS EVERY 6 HOURS
Status: COMPLETED | OUTPATIENT
Start: 2021-10-25 | End: 2021-10-26

## 2021-10-25 RX ORDER — BISACODYL 10 MG
10 SUPPOSITORY, RECTAL RECTAL DAILY PRN
Status: DISCONTINUED | OUTPATIENT
Start: 2021-10-27 | End: 2021-10-27 | Stop reason: HOSPADM

## 2021-10-25 RX ORDER — SERTRALINE HYDROCHLORIDE 25 MG/1
25 TABLET, FILM COATED ORAL DAILY
Status: DISCONTINUED | OUTPATIENT
Start: 2021-10-25 | End: 2021-10-27 | Stop reason: HOSPADM

## 2021-10-25 RX ORDER — NALOXONE HYDROCHLORIDE 0.4 MG/ML
0.4 INJECTION, SOLUTION INTRAMUSCULAR; INTRAVENOUS; SUBCUTANEOUS
Status: DISCONTINUED | OUTPATIENT
Start: 2021-10-25 | End: 2021-10-27 | Stop reason: HOSPADM

## 2021-10-25 RX ORDER — TRANEXAMIC ACID 10 MG/ML
1 INJECTION, SOLUTION INTRAVENOUS EVERY 30 MIN PRN
Status: DISCONTINUED | OUTPATIENT
Start: 2021-10-25 | End: 2021-10-27 | Stop reason: HOSPADM

## 2021-10-25 RX ORDER — OXYTOCIN/0.9 % SODIUM CHLORIDE 30/500 ML
PLASTIC BAG, INJECTION (ML) INTRAVENOUS
Status: DISCONTINUED
Start: 2021-10-25 | End: 2021-10-25 | Stop reason: HOSPADM

## 2021-10-25 RX ORDER — LABETALOL 100 MG/1
300 TABLET, FILM COATED ORAL EVERY 12 HOURS SCHEDULED
Status: DISCONTINUED | OUTPATIENT
Start: 2021-10-25 | End: 2021-10-26

## 2021-10-25 RX ORDER — ACETAMINOPHEN 325 MG/1
975 TABLET ORAL ONCE
Status: COMPLETED | OUTPATIENT
Start: 2021-10-25 | End: 2021-10-25

## 2021-10-25 RX ORDER — CITRIC ACID/SODIUM CITRATE 334-500MG
30 SOLUTION, ORAL ORAL
Status: COMPLETED | OUTPATIENT
Start: 2021-10-25 | End: 2021-10-25

## 2021-10-25 RX ORDER — MORPHINE SULFATE 1 MG/ML
INJECTION, SOLUTION EPIDURAL; INTRATHECAL; INTRAVENOUS PRN
Status: DISCONTINUED | OUTPATIENT
Start: 2021-10-25 | End: 2021-10-25

## 2021-10-25 RX ORDER — ONDANSETRON 2 MG/ML
INJECTION INTRAMUSCULAR; INTRAVENOUS
Status: DISCONTINUED
Start: 2021-10-25 | End: 2021-10-25 | Stop reason: HOSPADM

## 2021-10-25 RX ORDER — OXYTOCIN/0.9 % SODIUM CHLORIDE 30/500 ML
340 PLASTIC BAG, INJECTION (ML) INTRAVENOUS CONTINUOUS PRN
Status: DISCONTINUED | OUTPATIENT
Start: 2021-10-25 | End: 2021-10-25

## 2021-10-25 RX ORDER — TRANEXAMIC ACID 10 MG/ML
1 INJECTION, SOLUTION INTRAVENOUS EVERY 30 MIN PRN
Status: DISCONTINUED | OUTPATIENT
Start: 2021-10-25 | End: 2021-10-25

## 2021-10-25 RX ORDER — ONDANSETRON 4 MG/1
4 TABLET, ORALLY DISINTEGRATING ORAL EVERY 6 HOURS PRN
Status: DISCONTINUED | OUTPATIENT
Start: 2021-10-25 | End: 2021-10-27 | Stop reason: HOSPADM

## 2021-10-25 RX ORDER — LABETALOL 100 MG/1
300 TABLET, FILM COATED ORAL EVERY 12 HOURS SCHEDULED
Status: DISCONTINUED | OUTPATIENT
Start: 2021-10-25 | End: 2021-10-25

## 2021-10-25 RX ORDER — NICOTINE POLACRILEX 4 MG
15-30 LOZENGE BUCCAL
Status: DISCONTINUED | OUTPATIENT
Start: 2021-10-25 | End: 2021-10-27 | Stop reason: HOSPADM

## 2021-10-25 RX ORDER — ONDANSETRON 2 MG/ML
INJECTION INTRAMUSCULAR; INTRAVENOUS PRN
Status: DISCONTINUED | OUTPATIENT
Start: 2021-10-25 | End: 2021-10-25

## 2021-10-25 RX ORDER — FENTANYL CITRATE 50 UG/ML
INJECTION, SOLUTION INTRAMUSCULAR; INTRAVENOUS PRN
Status: DISCONTINUED | OUTPATIENT
Start: 2021-10-25 | End: 2021-10-25

## 2021-10-25 RX ORDER — SODIUM CHLORIDE, SODIUM LACTATE, POTASSIUM CHLORIDE, CALCIUM CHLORIDE 600; 310; 30; 20 MG/100ML; MG/100ML; MG/100ML; MG/100ML
INJECTION, SOLUTION INTRAVENOUS CONTINUOUS
Status: DISCONTINUED | OUTPATIENT
Start: 2021-10-25 | End: 2021-10-25

## 2021-10-25 RX ORDER — NALBUPHINE HYDROCHLORIDE 10 MG/ML
2.5-5 INJECTION, SOLUTION INTRAMUSCULAR; INTRAVENOUS; SUBCUTANEOUS EVERY 6 HOURS PRN
Status: DISCONTINUED | OUTPATIENT
Start: 2021-10-25 | End: 2021-10-25

## 2021-10-25 RX ORDER — OXYTOCIN 10 [USP'U]/ML
INJECTION, SOLUTION INTRAMUSCULAR; INTRAVENOUS PRN
Status: DISCONTINUED | OUTPATIENT
Start: 2021-10-25 | End: 2021-10-27 | Stop reason: HOSPADM

## 2021-10-25 RX ORDER — METOCLOPRAMIDE 5 MG/1
10 TABLET ORAL EVERY 6 HOURS PRN
Status: DISCONTINUED | OUTPATIENT
Start: 2021-10-25 | End: 2021-10-27 | Stop reason: HOSPADM

## 2021-10-25 RX ORDER — METHYLERGONOVINE MALEATE 0.2 MG/ML
200 INJECTION INTRAVENOUS
Status: DISCONTINUED | OUTPATIENT
Start: 2021-10-25 | End: 2021-10-27 | Stop reason: HOSPADM

## 2021-10-25 RX ORDER — ONDANSETRON 2 MG/ML
4 INJECTION INTRAMUSCULAR; INTRAVENOUS ONCE
Status: COMPLETED | OUTPATIENT
Start: 2021-10-25 | End: 2021-10-25

## 2021-10-25 RX ADMIN — KETOROLAC TROMETHAMINE 30 MG: 30 INJECTION, SOLUTION INTRAMUSCULAR; INTRAVENOUS at 23:52

## 2021-10-25 RX ADMIN — Medication 340 ML/HR: at 11:08

## 2021-10-25 RX ADMIN — SCOPALAMINE 1 PATCH: 1 PATCH, EXTENDED RELEASE TRANSDERMAL at 18:19

## 2021-10-25 RX ADMIN — BUPIVACAINE HYDROCHLORIDE IN DEXTROSE 1.8 ML: 7.5 INJECTION, SOLUTION SUBARACHNOID at 10:38

## 2021-10-25 RX ADMIN — KETOROLAC TROMETHAMINE 30 MG: 30 INJECTION, SOLUTION INTRAMUSCULAR; INTRAVENOUS at 18:14

## 2021-10-25 RX ADMIN — Medication 50 MCG/MIN: at 10:38

## 2021-10-25 RX ADMIN — CEFAZOLIN SODIUM 2 G: 2 INJECTION, SOLUTION INTRAVENOUS at 10:33

## 2021-10-25 RX ADMIN — PHENYLEPHRINE HYDROCHLORIDE 100 MCG: 10 INJECTION INTRAVENOUS at 10:48

## 2021-10-25 RX ADMIN — SODIUM CHLORIDE, POTASSIUM CHLORIDE, SODIUM LACTATE AND CALCIUM CHLORIDE: 600; 310; 30; 20 INJECTION, SOLUTION INTRAVENOUS at 09:28

## 2021-10-25 RX ADMIN — FENTANYL CITRATE 15 MCG: 50 INJECTION, SOLUTION INTRAMUSCULAR; INTRAVENOUS at 10:38

## 2021-10-25 RX ADMIN — DOCUSATE SODIUM AND SENNOSIDES 1 TABLET: 8.6; 5 TABLET ORAL at 21:02

## 2021-10-25 RX ADMIN — PHENYLEPHRINE HYDROCHLORIDE 100 MCG: 10 INJECTION INTRAVENOUS at 11:22

## 2021-10-25 RX ADMIN — Medication 0.15 MG: at 10:38

## 2021-10-25 RX ADMIN — ACETAMINOPHEN 975 MG: 325 TABLET, FILM COATED ORAL at 15:24

## 2021-10-25 RX ADMIN — ACETAMINOPHEN 975 MG: 325 TABLET, FILM COATED ORAL at 21:02

## 2021-10-25 RX ADMIN — ACETAMINOPHEN 975 MG: 325 TABLET, FILM COATED ORAL at 09:16

## 2021-10-25 RX ADMIN — SODIUM CHLORIDE, POTASSIUM CHLORIDE, SODIUM LACTATE AND CALCIUM CHLORIDE: 600; 310; 30; 20 INJECTION, SOLUTION INTRAVENOUS at 11:32

## 2021-10-25 RX ADMIN — ONDANSETRON 4 MG: 2 INJECTION INTRAMUSCULAR; INTRAVENOUS at 13:04

## 2021-10-25 RX ADMIN — LABETALOL HYDROCHLORIDE 300 MG: 100 TABLET ORAL at 16:16

## 2021-10-25 RX ADMIN — ONDANSETRON 4 MG: 2 INJECTION INTRAMUSCULAR; INTRAVENOUS at 10:59

## 2021-10-25 RX ADMIN — SERTRALINE HYDROCHLORIDE 25 MG: 25 TABLET ORAL at 21:02

## 2021-10-25 RX ADMIN — CEFAZOLIN SODIUM 2 G: 2 INJECTION, SOLUTION INTRAVENOUS at 10:08

## 2021-10-25 RX ADMIN — SODIUM CITRATE AND CITRIC ACID MONOHYDRATE 30 ML: 500; 334 SOLUTION ORAL at 10:08

## 2021-10-25 ASSESSMENT — LIFESTYLE VARIABLES: TOBACCO_USE: 1

## 2021-10-25 NOTE — PROGRESS NOTES
Patient complaining of nausea with movement, would like scopolamine patch. Advised this is ok, risk to infant for short term use is likely low. Baby is not getting a lot of breast milk yet and will be getting some formula supplement. Will discontinue patch as soon as able.   Rhoda Redman MD

## 2021-10-25 NOTE — ANESTHESIA CARE TRANSFER NOTE
Patient: Marie Yeboah    Procedure: Procedure(s):   SECTION  with bilateral salpingectomy       Diagnosis: H/O  section [Z98.891]  Diagnosis Additional Information: No value filed.    Anesthesia Type:   Spinal     Note:    Oropharynx: oropharynx clear of all foreign objects and spontaneously breathing  Level of Consciousness: awake  Oxygen Supplementation: room air    Independent Airway: airway patency satisfactory and stable  Dentition: dentition unchanged  Vital Signs Stable: post-procedure vital signs reviewed and stable  Report to RN Given: handoff report given  Patient transferred to: Phase II    Handoff Report: Identifed the Patient, Identified the Reponsible Provider, Reviewed the pertinent medical history, Discussed the surgical course, Reviewed Intra-OP anesthesia mangement and issues during anesthesia, Set expectations for post-procedure period and Allowed opportunity for questions and acknowledgement of understanding      Vitals:  Vitals Value Taken Time   /64 10/25/21 1230   Temp 97.6  F (36.4  C) 10/25/21 1225   Pulse 66 10/25/21 1235   Resp 10 10/25/21 1235   SpO2 97 % 10/25/21 1235   Vitals shown include unvalidated device data.    Electronically Signed By: KENDAL Huerta CRNA  2021  12:36 PM

## 2021-10-25 NOTE — ANESTHESIA PROCEDURE NOTES
Intrathecal Procedure Note    Pre-Procedure   Staff -        CRNA: Valeriano Bustillos APRN CRNA       Performed By: CRNA       Location: OR       Procedure Start/Stop Times: 10/25/2021 10:33 AM and 10/25/2021 10:38 AM       Pre-Anesthestic Checklist: patient identified, IV checked, risks and benefits discussed, informed consent, monitors and equipment checked and pre-op evaluation  Timeout:       Correct Patient: Yes        Correct Procedure: Yes        Correct Site: Yes        Correct Position: Yes   Procedure Documentation  Procedure: intrathecal       Patient Position: sitting       Patient Prep/Sterile Barriers: sterile gloves, mask, patient draped       Skin prep: Betadine       Insertion Site: L3-4. (midline approach).       Needle Gauge: 25.        Needle Length (Inches): 3.5        Spinal Needle Type: Zaid tip       Introducer used       Introducer: 20 G       # of attempts: 1 and  # of redirects:  0    Assessment/Narrative         Paresthesias: No.       CSF fluid: clear.      Opening pressure was cmH2O while  Sitting.

## 2021-10-25 NOTE — PROGRESS NOTES
1032 To OR for repeat CS and Bilateral  Salpingectomy consents signed.1034 FHTS pre spinal 130 and FHTS after spinal @ 1038 FHTS 120. Transfer of care to Aleksandra CLARKE and Valeriano ALCAZAR.

## 2021-10-25 NOTE — ANESTHESIA PROCEDURE NOTES
Health Maintenance Due   Topic Date Due   • Shingles Vaccine (1 of 2) 06/27/1995   • Medicare Wellness Visit  06/01/2010   • Colorectal Cancer Screening-Colonoscopy  01/30/2020   • Influenza Vaccine (1) 09/01/2020       Patient is due for topics as listed above but is not proceeding with Immunization(s) Shingles, Colorectal Cancer Screening: Colonoscopy and MWV (Medicare Wellness Visit) at this time. Pt states that he will schedule a colonoscopy when he can arrange for transportation from the procedure. Pt would like a flu shot today.          TAP Procedure Note    Pre-Procedure   Staff -        CRNA: Valeriano Bustillos APRN CRNA       Performed By: CRNA       Location: post-op       Procedure Start/Stop Times: 10/25/2021 11:42 AM and 10/25/2021 12:05 PM       Pre-Anesthestic Checklist: patient identified, IV checked, site marked, risks and benefits discussed, informed consent, monitors and equipment checked, pre-op evaluation, at physician/surgeon's request and post-op pain management  Timeout:       Correct Patient: Yes        Correct Procedure: Yes        Correct Site: Yes        Correct Position: Yes        Correct Laterality: Yes        Site Marked: Yes  Procedure Documentation  Procedure: TAP       Laterality: bilateral       Patient Position: supine       Patient Prep/Sterile Barriers: sterile gloves, mask       Skin prep: Chloraprep       Local skin infiltrated with 3 mL of 2% lidocaine.        Needle Type: insulated       Needle Gauge: 22.        Needle Length (millimeters): 100        Ultrasound guided       1. Ultrasound was used to identify targeted nerve, plexus, vascular marker, or fascial plane and place a needle adjacent to it in real-time.       2. Ultrasound was used to visualize the spread of anesthetic in close proximity to the above referenced structure.       4. The visualized anatomic structures appeared normal.       5. There were no apparent abnormal pathologic findings.    Assessment/Narrative         The placement was negative for: blood aspirated, painful injection and site bleeding    Test dose of mL at.         Test dose negative, 3 minutes after injection, for signs of intravascular, subdural, or intrathecal injection.      Bolus given via needle. No blood aspirated via catheter.        Secured via.        Insertion/Infusion Method: Single Shot       Complications: none       Injection made incrementally with aspirations every 5 mL.     Comments:  Placed with the assistance of an RN.  Good visualization of the spread  laterally.  No HR increase with injections and no other complications noted.  I will follow up with the pt if needed.

## 2021-10-25 NOTE — OR NURSING
Verbal report received from Aleksandra SAGE RN and Valeriano ALCAZAR. Phase 1 recovery assumed by Shalonda CLARKE. Pt post-op spinal for repeat  per .

## 2021-10-25 NOTE — PROGRESS NOTES
I was notified by RN of a positive antibody screen on Marie's blood from today.  This is new.  Her antibody screen at the beginning of pregnancy was negative.  She is now positive for anti-C antibodies.  Her red cell C antigen is negative.  I spoke with Dr. Eli who was similarly puzzled by this finding but states that the spontaneous development of autoantibodies does occur rarely for no apparent cause.  Marie denies blood transfusion, blood products, or other known source of blood exposure. We will check fetal blood type and CINDI and monitor for hyperbilirubinemia. Because Marie had salpingectomy and is not planning future pregnancies, will not likely need additional followup, but could recheck ABO-Rh T&S in future for spontaneous clearance.   Rhoda Redman MD

## 2021-10-25 NOTE — OR NURSING
Transfer from  Room 330 to Room 355  Transferred via bed.    S: 30 y/o female  S/P repeat        Anesthesia Type:  spinal       Surgeon:  Dr. Leblanc       Allergies:  See Medication Reconciliation Record       DNR: NO       B:  Pertinent Medical History:   Past Medical History:   Diagnosis Date     Anxiety      ASCUS with positive high risk HPV cervical 2008 (age 18) per Care Everywhere. NIL paps in '16, 18 & 21     Depressive disorder      Gestational diabetes mellitus (GDM) in third trimester, gestational diabetes method of control unspecified 2021     HTN (hypertension)              Surgical History:    Past Surgical History:   Procedure Laterality Date     C/SECTION, LOW TRANSVERSE      , 2016     HC REMOVAL OF TONSILS,<11 Y/O         A:  EBL: 340 ml        IVF:  2000 ml        UOP:  275 ml        NPO:  ___Yes __x_No; ice chips         Vomiting:  ___Yes __x_No; c/o nausea x1 which Zofran IV resolved         Drainage: sm.amt.rubra lochia noted        Skin Integrity: normal x/ incision          RFO: _x__Yes___No; myers cath        SSI Patient?  ___Yes_x__No       Brace/sling/equipment:  __x_Yes___No; pneumoboots           See PACU record for ongoing assessment, vital signs and pain assessment.    R: Post-Op vitals and assessments as ordered/indicated per patient's condition.       Follow Post-Op orders and notify Physician prn.       Continue to involve patient/family in plan of care and discharge planning.       Reinforce Pre-Operative education.       Implement skin safety interventions as appropriate.  Report given to Pam CLARKE/OB    Name: Shalonda Chamberlain RN, PACU

## 2021-10-25 NOTE — OP NOTE
[facility] Obstetrics Operative Note  Surgeon: Rajinder Leblanc MD  Assistant: Rhoda Redman MD  (The assistance of this surgeon was required due to the need for additional skilled surgical hands to retract and hold instruments due to the nature of the surgical procedure and risk of complications)    Assistant::Jimbo Pickett MD    PREOPERATIVE DIAGNOSIS:       Wishes Repeat Cesarian section    Requests tubal ligation/salpingectomy    POSTOPERATIVE DIAGNOSIS:  Same     PROCEDURE:    section and bilateral partial postpartum salpingectomy    ANESTHESIA:  Spinal Anesthesia  And TAP block       OPERATIVE FINDINGS: We delivered a male  infant with Apgars of 8 and 9  and weight of 5 pounds 9 oz there was meconium noted at birth and so cord gases were done and the pH was 7.35.    OPERATIVE DESCRIPTION:  After obtaining informed consent and after the establishment of satisfactory anesthesia, the patient was prepped and draped in the usual fashion for  section.  A Rivera catheter was inserted in the urinary bladder.     A Pfannenstiel incision was made through the skin and carried down to the rectus fascia which was scored in the midline and then extended bilaterally with Morrison scissors.  The fascia was then  from the underlying rectus muscles sharply and bluntly and then the muscles were divided in the midline.  The peritoneum was then entered bluntly with a gloved finger and the incision was extended under direct visualization. My partner Dr. Redman assisted me in this process by holding the tissues in such a way to allow me adequate visualization and also helped with the cutting of the tissues.. This assistance was vital to the success of the surgery.   The bladder blade was then positioned.  The bladder flap was then mobilized using Metzenbaum scissors and the bladder blade was then repositioned.  A low transverse incision was made in the lower uterine segment and smiled gently  upwards, and the last layer was made bluntly with gloved finger to avoid trauma to the fetus.    The vertex was then lifted through the uterine scar atraumatically and the mouth and nostrils were bulb suctioned.  The body was then delivered atraumatically and the cord was double clamped and cut and the infant was taken to the warmer for observation.  See operative findings above. Just prior to replacing the uterus in the abdomen, the bilateral partial salpingectomy was done using the small LigaSure device and the tubes were then cut away and sent for pathology labeled separately.     The placenta was manually removed.  The uterine cavity was curetted with a moist lap sponge and all placental fragments and membranes removed.  The uterus was exteriorized and the uterine incision was repaired with 0 Vicryl running locked suture .  After hemostasis was assured, the uterus was replaced in to the abdominal cavity and the paracolic gutters were irrigated and clots removed.  The uterine incision was examined again and found to be hemostatic.    The peritoneum was repaired with 2-0 Vicryl.  The rectus fascia was repaired with running 0 Vicryl.  The subcutaneous tissues were irrigated and found to be hemostatic.  Subcutaneous 2-0 vicryl sutures were placed.   The skin was closed with running 4-0 monocryl subcuticular sutures on a Bossman needle.  At 1 point a piece of the plastic drape broke away from the main drape and dropped into the wound.  We retrieved it instantly and discarded it.  I did check very carefully to make sure there were no other fragments in the wound and Dr. Pickett did as well and we were very satisfied that there was no compromise of the wound.    The wound was then dressed.  Final sponge, needle and instrument counts were reported to me as correct.  Estimated blood loss was  340 cc.  The urine remained clear throughout the operation and into recovery.   Mom and infant were taken to the Recovery Room in  satisfactory condition and there were no complications.     Rajinder Leblanc M.D.

## 2021-10-25 NOTE — PLAN OF CARE
S:  Section Delivery  B: Repeat  Section /Bilateral Salpingectomy @ 1107  A: Baby delivered, cord clamping delayed for 60-90 seconds, then brought to pre- warmed infant warmer. Infant stimulated and dried. Infant then brought to mother and placed skin to skin for bonding. Apgars 8/9. Educated mother on expected feeding readiness cues and encouraged her to observe for feeding cues. Mother informed that breast feeding assistance would be provided.   R: Mother and baby bonding well. Anticipate first feed within the hour.

## 2021-10-25 NOTE — PROGRESS NOTES
S: Scheduled C/S  B: Patient is a  @ 37w0d gestation with history of previous C/S  A: [facility] for scheduled C/S and Bilateral salpingectomy.. VS stable. 20 minute category 1 FHT strip obtained. IV placed, fluids initiated. Procedure explained to patient, patient educated on importance of skin-to-skin contact and intent for skin-to-skin initiation in OR. Questions answered. Consents signed. Patient prepped for surgery.  R: Will proceed with scheduled C/S as planned.

## 2021-10-25 NOTE — ANESTHESIA PREPROCEDURE EVALUATION
Anesthesia Pre-Procedure Evaluation    Patient: Marie Yeboah   MRN: [id number] : 1990        Preoperative Diagnosis: H/O  section [Z98.891]    Procedure : Procedure(s):   SECTION  also bilateral salpingectomy          Past Medical History:   Diagnosis Date     Anxiety      ASCUS with positive high risk HPV cervical 2008 (age 18) per Care Everywhere. NIL paps in 16,  &      Depressive disorder      Gestational diabetes mellitus (GDM) in third trimester, gestational diabetes method of control unspecified 2021     HTN (hypertension)       Past Surgical History:   Procedure Laterality Date     C/SECTION, LOW TRANSVERSE      ,      HC REMOVAL OF TONSILS,<13 Y/O        Allergies   Allergen Reactions     Contrast Dye Anaphylaxis     Bee Venom      Other reaction(s): Hives  Other reaction(s): Hives      Social History     Tobacco Use     Smoking status: Former Smoker     Years: 5.00     Types: Cigarettes     Start date: 2007     Smokeless tobacco: Never Used   Substance Use Topics     Alcohol use: Yes      Wt Readings from Last 1 Encounters:   10/18/21 117.5 kg (259 lb)        Anesthesia Evaluation   Pt has had prior anesthetic. Type: Regional.    No history of anesthetic complications       ROS/MED HX  ENT/Pulmonary:     (+) tobacco use, Past use,     Neurologic:  - neg neurologic ROS     Cardiovascular:     (+) hypertension-----    METS/Exercise Tolerance:     Hematologic:  - neg hematologic  ROS     Musculoskeletal:  - neg musculoskeletal ROS     GI/Hepatic:       Renal/Genitourinary:       Endo: Comment: - Gestational diabetes in the third trimester     (+) type II DM, Obesity,     Psychiatric/Substance Use:     (+) psychiatric history anxiety     Infectious Disease:       Malignancy:       Other:            Physical Exam    Airway        Mallampati: II   TM distance: > 3 FB   Neck ROM: full   Mouth opening: > 3 cm    Respiratory Devices and  Support         Dental  no notable dental history         Cardiovascular   cardiovascular exam normal          Pulmonary   pulmonary exam normal                OUTSIDE LABS:  CBC:   Lab Results   Component Value Date    WBC 8.7 10/19/2021    WBC 8.4 09/29/2021    HGB 12.7 10/19/2021    HGB 12.1 09/29/2021    HCT 37.4 10/19/2021    HCT 35.5 09/29/2021     10/19/2021     09/29/2021     BMP:   Lab Results   Component Value Date     09/29/2021     08/27/2021    POTASSIUM 3.6 09/29/2021    POTASSIUM 3.5 08/27/2021    CHLORIDE 106 09/29/2021    CHLORIDE 105 08/27/2021    CO2 22 09/29/2021    CO2 25 08/27/2021    BUN 13 09/29/2021    BUN 8 08/27/2021    CR 0.74 09/29/2021    CR 0.57 08/27/2021     (H) 09/29/2021     (H) 08/27/2021     COAGS: No results found for: PTT, INR, FIBR  POC:   Lab Results   Component Value Date    HCG Negative 03/12/2019    HCGS Negative 12/08/2020     HEPATIC:   Lab Results   Component Value Date    ALBUMIN 2.6 (L) 09/29/2021    PROTTOTAL 6.7 (L) 09/29/2021    ALT 20 10/19/2021    AST 10 10/19/2021    ALKPHOS 77 09/29/2021    BILITOTAL 0.2 09/29/2021     OTHER:   Lab Results   Component Value Date    A1C 5.4 03/22/2021    AQUILINO 8.7 09/29/2021    TSH 2.61 05/22/2020       Anesthesia Plan    ASA Status:  2   NPO Status:  NPO Appropriate    Anesthesia Type: Spinal.              Consents    Anesthesia Plan(s) and associated risks, benefits, and realistic alternatives discussed. Questions answered and patient/representative(s) expressed understanding.     - Discussed with:  Patient      - Extended Intubation/Ventilatory Support Discussed: No.      - Patient is DNR/DNI Status: No    Use of blood products discussed: Yes.     - Discussed with: Patient.     Postoperative Care    Pain management: IV analgesics, Peripheral nerve block (Single Shot).   PONV prophylaxis: Ondansetron (or other 5HT-3)     Comments:    The risks and benefits of anesthesia, and the  alternatives where applicable, have been discussed with the patient, and they wish to proceed.            Valeriano Bustillos APRN CRNA

## 2021-10-25 NOTE — PLAN OF CARE
S: Transfer to postpartum  B:R CSbirth @ 1107, breast feeding    A: Mother and baby transferred to postpartum unit at 1335 via bed after completion of immediate recovery period. Patient oriented to room. Mother and baby bonding well and in satisfactory condition upon transfer.  R: Anticipate routine postpartum care.

## 2021-10-26 LAB
GLUCOSE BLDC GLUCOMTR-MCNC: 85 MG/DL (ref 70–99)
HGB BLD-MCNC: 10.3 G/DL (ref 11.7–15.7)

## 2021-10-26 PROCEDURE — 250N000013 HC RX MED GY IP 250 OP 250 PS 637: Performed by: OBSTETRICS & GYNECOLOGY

## 2021-10-26 PROCEDURE — 120N000001 HC R&B MED SURG/OB

## 2021-10-26 PROCEDURE — 85018 HEMOGLOBIN: CPT | Performed by: OBSTETRICS & GYNECOLOGY

## 2021-10-26 PROCEDURE — 250N000011 HC RX IP 250 OP 636: Performed by: OBSTETRICS & GYNECOLOGY

## 2021-10-26 PROCEDURE — 36415 COLL VENOUS BLD VENIPUNCTURE: CPT | Performed by: OBSTETRICS & GYNECOLOGY

## 2021-10-26 RX ORDER — LABETALOL 100 MG/1
200 TABLET, FILM COATED ORAL EVERY 12 HOURS SCHEDULED
Status: DISCONTINUED | OUTPATIENT
Start: 2021-10-27 | End: 2021-10-27 | Stop reason: HOSPADM

## 2021-10-26 RX ADMIN — LABETALOL HYDROCHLORIDE 300 MG: 100 TABLET ORAL at 14:27

## 2021-10-26 RX ADMIN — IBUPROFEN 800 MG: 800 TABLET, FILM COATED ORAL at 18:07

## 2021-10-26 RX ADMIN — IBUPROFEN 800 MG: 800 TABLET, FILM COATED ORAL at 12:26

## 2021-10-26 RX ADMIN — DOCUSATE SODIUM AND SENNOSIDES 2 TABLET: 8.6; 5 TABLET ORAL at 09:33

## 2021-10-26 RX ADMIN — ACETAMINOPHEN 975 MG: 325 TABLET, FILM COATED ORAL at 21:03

## 2021-10-26 RX ADMIN — KETOROLAC TROMETHAMINE 30 MG: 30 INJECTION, SOLUTION INTRAMUSCULAR; INTRAVENOUS at 06:17

## 2021-10-26 RX ADMIN — IBUPROFEN 800 MG: 800 TABLET, FILM COATED ORAL at 23:50

## 2021-10-26 RX ADMIN — ACETAMINOPHEN 975 MG: 325 TABLET, FILM COATED ORAL at 09:32

## 2021-10-26 RX ADMIN — ACETAMINOPHEN 975 MG: 325 TABLET, FILM COATED ORAL at 15:45

## 2021-10-26 RX ADMIN — SERTRALINE HYDROCHLORIDE 25 MG: 25 TABLET ORAL at 21:02

## 2021-10-26 RX ADMIN — ACETAMINOPHEN 975 MG: 325 TABLET, FILM COATED ORAL at 04:15

## 2021-10-26 RX ADMIN — DOCUSATE SODIUM AND SENNOSIDES 2 TABLET: 8.6; 5 TABLET ORAL at 21:03

## 2021-10-26 NOTE — PROGRESS NOTES
"Subjective: Marie is now postopday # 1. She reports adequate pain control and no other c/o.  She had a tap block and reports that her pain is very minimal.  she has no postpartum depression.    Objective: VSS. /52   Pulse 64   Temp (!) 96.4  F (35.8  C) (Oral)   Resp 16   Ht 1.702 m (5' 7\")   LMP 2021 (Exact Date)   SpO2 98%   Breastfeeding Unknown   BMI 40.57 kg/m      Chest is clear to auscultation. No wheezes, rales or rhonchi heard. cardiac exam is normal with s1, s2, no murmurs or adventitious sounds.Normal rate and rhythm is heard.    Abdomen is soft and fundus is firm and not appreciably tender.    Extremities without edema.Incision is clean, dry, intact.  s    HGB: 10.3    Assessment: 1.stable postop course on post op day # 1 from  section.    2. Postoperative blood loss anemia(also dilutional)-expected--this should improve sufficiently with her taking a prenatal vitamin with iron.    Plan: Hope to discharge in 1-2 days       HERIBERTO Leblanc MD    "

## 2021-10-26 NOTE — ANESTHESIA POSTPROCEDURE EVALUATION
Patient: Marie Yeboah    Procedure: Procedure(s):   SECTION  with bilateral salpingectomy       Diagnosis:H/O  section [Z98.891]  Diagnosis Additional Information: No value filed.    Anesthesia Type:  Spinal    Note:  Disposition: Inpatient   Postop Pain Control: Uneventful            Sign Out: Well controlled pain   PONV: No   Neuro/Psych: Uneventful            Sign Out: Acceptable/Baseline neuro status   Airway/Respiratory: Uneventful            Sign Out: Acceptable/Baseline resp. status   CV/Hemodynamics: Uneventful            Sign Out: Acceptable CV status   Other NRE: NONE   DID A NON-ROUTINE EVENT OCCUR? No    Event details/Postop Comments:  Pt was happy with her anesthesia care.  No complications.  I advised the pt she may have some soreness at the spinal needle site and this is normal.  However if the soreness continues over a week or if redness is noted around the site to let anesthesia know.  I will follow up with the pt if needed.           Last vitals:  Vitals Value Taken Time   /82 10/25/21 1300   Temp 97.6  F (36.4  C) 10/25/21 1225   Pulse 67 10/25/21 1313   Resp 26 10/25/21 1313   SpO2 97 % 10/25/21 1313   Vitals shown include unvalidated device data.    Electronically Signed By: KENDAL Huerta CRNA  2021  8:42 AM

## 2021-10-26 NOTE — PLAN OF CARE
S-(situation): shift note    B-(background): , repeat c/s 1107 today    A-(assessment): VSS, /63. Flow light. Assisted to BR at , unable to void. Slight dizziness when up, assisted back without incident. Scopalomine patch in place.  Bladder scan showing around 50ml    R-(recommendations): Encouraged fluids. Will assist up again and rescanif unable to void. Cont routine post c/s cares

## 2021-10-26 NOTE — PLAN OF CARE
Up to void, feeling better this time. Unable to void. 143ml bladder scan. Is doing well with oral fluids

## 2021-10-26 NOTE — PLAN OF CARE
S: Shift review  B:Marie is a , day 1 post  birth.  A: Stable post-op, incision is intact without drainage or redness. Inner dry under pannus., Lung sounds-clear, bowel sounds hypoactive in all 4 quadrants, 2 senna given this am, encouraged ambulation 3-4 times/day, Voided large amount. independent with mobility, pain control achieved with p.o. pain meds. Handles baby with confidence.BF independently every 2-4 hours.  R: Continue with plan of care. Offer pain meds routinely. Encourage ambulation.

## 2021-10-26 NOTE — PROGRESS NOTES
[facility] Obstetrics Post-Op / Progress Note         Assessment and Plan:    Assessment:   Post-operative day #1  Low transverse repeat  section  Post-partum salpingectomy  Hypertension  GDM A1  L&D complications: None  Initially had urine retention, but has resolved.       Doing well.  Normal healing wound.  Glucoses normal postpartum  BP improved.   No excessive bleeding  Pain well-controlled.      Plan:   Ambulation encouraged  Breast feeding strategies discussed  Pain control measures as needed  Anticipate discharge tomorrow.   BP med dose adjusted, had to be held this am due to feeling lightheaded, will continue to monitor and adjust prn.   Patient requests flu shot, will give prior to discharge.            Interval History:   Doing well.  Pain is well-controlled.  No fevers.  No history of wound drainage, warmth or significant erythema.  Able to void well today. Good appetite.  Denies chest pain, shortness of breath, nausea or vomiting.  Ambulatory.  Breastfeeding well. Baby still working on feeding and temp regulation but improving.           Significant Problems:    None  Had urinary retention yesterday but able to void full volume today. No BM yet, but able to pass gas.           Review of Systems:    as above          Medications:     All medications related to the patient's surgery have been reviewed  Current Facility-Administered Medications   Medication     acetaminophen (TYLENOL) tablet 975 mg     [START ON 10/27/2021] bisacodyl (DULCOLAX) Suppository 10 mg     carboprost (HEMABATE) injection 250 mcg     dextrose 5% in lactated ringers infusion     glucose gel 15-30 g    Or     dextrose 50 % injection 25-50 mL    Or     glucagon injection 1 mg     hydrocortisone 2.5 % cream     ibuprofen (ADVIL/MOTRIN) tablet 800 mg     insulin aspart (NovoLOG) injection (RAPID ACTING)     insulin aspart (NovoLOG) injection (RAPID ACTING)     labetalol (NORMODYNE) tablet 300 mg     lanolin  cream     lidocaine (LMX4) kit     lidocaine 1 % 0.1-1 mL     methylergonovine (METHERGINE) injection 200 mcg     metoclopramide (REGLAN) injection 10 mg    Or     metoclopramide (REGLAN) tablet 10 mg     misoprostol (CYTOTEC) tablet 400 mcg    Or     misoprostol (CYTOTEC) suppository 800 mcg     naloxone (NARCAN) injection 0.2 mg    Or     naloxone (NARCAN) injection 0.4 mg    Or     naloxone (NARCAN) injection 0.2 mg    Or     naloxone (NARCAN) injection 0.4 mg     No MMR Needed -  Assessment: Patient does not need MMR vaccine     No Tdap Needed - Assessment: Patient does not need Tdap vaccine     ondansetron (ZOFRAN-ODT) ODT tab 4 mg    Or     ondansetron (ZOFRAN) injection 4 mg     oxyCODONE (ROXICODONE) tablet 5 mg     oxytocin (PITOCIN) 30 units in 500 mL 0.9% NaCl infusion     oxytocin (PITOCIN) injection 10 Units     oxytocin (PITOCIN) injection     prochlorperazine (COMPAZINE) injection 10 mg    Or     prochlorperazine (COMPAZINE) tablet 10 mg    Or     prochlorperazine (COMPAZINE) suppository 25 mg     scopolamine (TRANSDERM) 72 hr patch 1 patch    And     scopolamine (TRANSDERM-SCOP) Patch in Place     senna-docusate (SENOKOT-S/PERICOLACE) 8.6-50 MG per tablet 1 tablet    Or     senna-docusate (SENOKOT-S/PERICOLACE) 8.6-50 MG per tablet 2 tablet     sertraline (ZOLOFT) tablet 25 mg     simethicone (MYLICON) chewable tablet 80 mg     sodium chloride (PF) 0.9% PF flush 3 mL     sodium chloride (PF) 0.9% PF flush 3 mL     [START ON 10/27/2021] sodium phosphate (FLEET ENEMA) 1 enema     tranexamic acid 1 g in 100 mL 0.7% NaCl IV bag (premix)             Physical Exam:     All vitals stable  Patient Vitals for the past 24 hrs:   BP Temp Temp src Pulse Resp SpO2   10/26/21 0800 108/44 98.3  F (36.8  C) Oral 75 16 98 %   10/26/21 0416 -- 98.2  F (36.8  C) Oral -- 18 --   10/26/21 0413 -- -- -- -- -- 96 %   10/26/21 0412 105/49 -- -- 78 -- --   10/25/21 2218 102/43 98  F (36.7  C) Oral 74 16 97 %   10/25/21 1930  136/63 97.9  F (36.6  C) Oral 73 16 98 %   10/25/21 1800 (!) 156/83 -- -- 70 20 --   10/25/21 1635 (!) 154/83 97.5  F (36.4  C) Oral 80 18 99 %   10/25/21 1600 (!) 155/87 -- -- 72 -- 99 %   10/25/21 1530 (!) 148/88 -- -- 71 -- 100 %   10/25/21 1515 (!) 141/77 -- -- 71 -- 100 %   10/25/21 1500 (!) 166/92 -- -- 75 -- 97 %   10/25/21 1445 (!) 149/78 -- -- 70 -- 98 %   10/25/21 1430 (!) 149/64 97.9  F (36.6  C) Oral 67 16 97 %   10/25/21 1415 (!) 156/62 -- -- 65 -- 97 %   10/25/21 1400 (!) 148/88 -- -- 67 -- 99 %   10/25/21 1345 (!) 163/73 -- -- 77 16 98 %   10/25/21 1320 (!) 147/87 -- -- 67 16 98 %   10/25/21 1315 (!) 147/87 97.8  F (36.6  C) Oral 70 29 98 %   10/25/21 1300 139/82 -- -- -- -- --   10/25/21 1255 -- -- -- -- -- 97 %   10/25/21 1250 -- -- -- -- -- 97 %   10/25/21 1245 137/79 -- -- 64 15 99 %   10/25/21 1240 (!) 142/82 -- -- 68 16 97 %   10/25/21 1235 (!) 140/86 -- -- 66 16 97 %   10/25/21 1232 -- -- -- -- 16 98 %   10/25/21 1230 130/64 -- -- 70 15 97 %   10/25/21 1225 131/68 97.6  F (36.4  C) -- 72 16 97 %   10/25/21 1220 139/54 -- -- 75 15 96 %   10/25/21 1215 135/74 -- -- 68 -- 96 %   10/25/21 1211 138/81 97.6  F (36.4  C) Oral 72 16 97 %        Vitals noted.  Patient alert, oriented, and in no acute distress. CV:  RRR without murmur. Respiratory:  Lungs clear to auscultation bilaterally. Abdomen:  Soft, tender with palpation of the uterine fundus which is firm and below the level of the umbilicus, nondistended with good bowel sounds and no masses or hepatosplenomegaly.  Incision clean and dry, intact, no erythema or drainage.  Extremities warm and dry without significant edema.             Data:     Lab Results   Component Value Date    HGB 10.3 10/26/2021    HGB 12.3 03/22/2021       Rhoda Redman MD

## 2021-10-26 NOTE — PLAN OF CARE
S-(situation): shift note    B-(background): P3, repeat c/s, 1st pp day. GDM, HTN    A-(assessment): VSS, /52. States pain minimal. Ambulating, voiding. Confident with breastfeeding    R-(recommendations): cont routine pp cares. Hoping for discharge tomorrow

## 2021-10-26 NOTE — PLAN OF CARE
Problem: Postoperative Urinary Retention (Postpartum  Delivery)  Goal: Effective Urinary Elimination  Outcome: No Change   Marie has been unable to void since delivery. Bladder emptied x2 via straight cath. Feels urge to pee but unsuccessful when attempts.  Problem: Adult Inpatient Plan of Care  Goal: Optimal Comfort and Wellbeing  Outcome: Improving  Goal: Readiness for Transition of Care  Outcome: Improving  Problem: Adjustment to Role Transition (Postpartum  Delivery)  Goal: Successful Maternal Role Transition  Outcome: Improving   [place] and attentive to infant needs. Independently performing self and infant needs in room. Ambulating without difficulty.  Problem: Bleeding (Postpartum  Delivery)  Goal: Hemostasis  Outcome: Improving   Bleeding has been stable since delivery.  Problem: Pain (Postpartum  Delivery)  Goal: Acceptable Pain Control  Outcome: Improving  Intervention: Prevent or Manage Pain  Recent Flowsheet Documentation  Taken 10/26/2021 0415 by Wandy Alvarez, RN  Pain Management Interventions: medication (see MAR)   Managing pain with scheduled Toradol and Tylenol. Pain manageable per patient and responds to interventions.

## 2021-10-27 VITALS
TEMPERATURE: 98.1 F | DIASTOLIC BLOOD PRESSURE: 52 MMHG | HEART RATE: 84 BPM | OXYGEN SATURATION: 98 % | SYSTOLIC BLOOD PRESSURE: 140 MMHG | HEIGHT: 67 IN | RESPIRATION RATE: 18 BRPM | BODY MASS INDEX: 40.57 KG/M2

## 2021-10-27 LAB
PATH REPORT.COMMENTS IMP SPEC: NORMAL
PATH REPORT.COMMENTS IMP SPEC: NORMAL
PATH REPORT.FINAL DX SPEC: NORMAL
PATH REPORT.GROSS SPEC: NORMAL
PATH REPORT.MICROSCOPIC SPEC OTHER STN: NORMAL
PATH REPORT.RELEVANT HX SPEC: NORMAL
PHOTO IMAGE: NORMAL

## 2021-10-27 PROCEDURE — 0UB70ZZ EXCISION OF BILATERAL FALLOPIAN TUBES, OPEN APPROACH: ICD-10-PCS | Performed by: OBSTETRICS & GYNECOLOGY

## 2021-10-27 PROCEDURE — 90686 IIV4 VACC NO PRSV 0.5 ML IM: CPT | Performed by: FAMILY MEDICINE

## 2021-10-27 PROCEDURE — G0008 ADMIN INFLUENZA VIRUS VAC: HCPCS | Performed by: FAMILY MEDICINE

## 2021-10-27 PROCEDURE — 250N000011 HC RX IP 250 OP 636: Performed by: FAMILY MEDICINE

## 2021-10-27 PROCEDURE — 88302 TISSUE EXAM BY PATHOLOGIST: CPT | Mod: 26 | Performed by: PATHOLOGY

## 2021-10-27 PROCEDURE — 250N000013 HC RX MED GY IP 250 OP 250 PS 637: Performed by: OBSTETRICS & GYNECOLOGY

## 2021-10-27 RX ORDER — MODIFIED LANOLIN
OINTMENT (GRAM) TOPICAL
Start: 2021-10-27 | End: 2022-01-21

## 2021-10-27 RX ORDER — LABETALOL 100 MG/1
200 TABLET, FILM COATED ORAL 2 TIMES DAILY
Qty: 360 TABLET | Refills: 1 | Status: SHIPPED | OUTPATIENT
Start: 2021-10-27 | End: 2021-12-01 | Stop reason: ALTCHOICE

## 2021-10-27 RX ORDER — ACETAMINOPHEN 325 MG/1
975 TABLET ORAL EVERY 4 HOURS PRN
Qty: 60 TABLET | Refills: 0 | Status: SHIPPED | OUTPATIENT
Start: 2021-10-27 | End: 2022-05-13

## 2021-10-27 RX ORDER — IBUPROFEN 200 MG
600 TABLET ORAL EVERY 6 HOURS PRN
COMMUNITY
Start: 2021-10-27 | End: 2022-05-13

## 2021-10-27 RX ADMIN — ACETAMINOPHEN 975 MG: 325 TABLET, FILM COATED ORAL at 10:06

## 2021-10-27 RX ADMIN — ACETAMINOPHEN 975 MG: 325 TABLET, FILM COATED ORAL at 04:00

## 2021-10-27 RX ADMIN — DOCUSATE SODIUM AND SENNOSIDES 1 TABLET: 8.6; 5 TABLET ORAL at 09:04

## 2021-10-27 RX ADMIN — IBUPROFEN 800 MG: 800 TABLET, FILM COATED ORAL at 12:41

## 2021-10-27 RX ADMIN — INFLUENZA A VIRUS A/VICTORIA/2570/2019 IVR-215 (H1N1) ANTIGEN (FORMALDEHYDE INACTIVATED), INFLUENZA A VIRUS A/TASMANIA/503/2020 IVR-221 (H3N2) ANTIGEN (FORMALDEHYDE INACTIVATED), INFLUENZA B VIRUS B/PHUKET/3073/2013 ANTIGEN (FORMALDEHYDE INACTIVATED), AND INFLUENZA B VIRUS B/WASHINGTON/02/2019 ANTIGEN (FORMALDEHYDE INACTIVATED) 0.5 ML: 15; 15; 15; 15 INJECTION, SUSPENSION INTRAMUSCULAR at 10:22

## 2021-10-27 RX ADMIN — IBUPROFEN 800 MG: 800 TABLET, FILM COATED ORAL at 06:04

## 2021-10-27 NOTE — PROGRESS NOTES
Dr Abrahamson called. Do not call with baby's bili result unless critically high. Change the dose of Labetalol to 200mg PO BID. Hold dose of labetalol if SBP less than 110 and/or DBP less than 70.

## 2021-10-27 NOTE — DISCHARGE SUMMARY
[facility] Discharge Summary    Marie Yeboah MRN# [id number]   Age: 31 year old YOB: 1990     Date of Admission:  10/25/2021  Date of Discharge::  10/27/2021  Admitting Physician:  Rajinder Leblanc MD  Discharge Physician:  Rhoda Redman MD     Home clinic: [facility]          Admission Diagnoses:   Supervision of other high risk pregnancy  History of preeclampsia in previous pregnancy  Hypertension  GDM, A1  Previous  section          Discharge Diagnosis:   S/p repeat  section  Lactation  Hypertension          Procedures:   Procedure(s): Repeat low transverse  section   Salpingectomy, bilateral           Medications Prior to Admission:     Medications Prior to Admission   Medication Sig Dispense Refill Last Dose     acetone urine (KETOSTIX) test strip Test with first void of the day for 7 days.  If negative or trace for 7 days, reduce testing to one time per week. 50 strip 1 Past Week at Unknown time     alcohol swab prep pads Use to swab area of injection/carolina as directed. 100 each 3 10/25/2021 at Unknown time     alcohol swab prep pads Use to swab area of injection/carolina as directed. 200 each 1 10/25/2021 at Unknown time     blood glucose (NO BRAND SPECIFIED) lancets standard Use to test blood sugar 4-6 times daily or as directed. 200 each 1 10/24/2021 at Unknown time     blood glucose (NO BRAND SPECIFIED) test strip Use to test blood sugar 4-6 times daily or as directed. 200 strip 1 10/24/2021 at Unknown time     blood glucose monitoring (NO BRAND SPECIFIED) meter device kit Use to test blood sugar 4-6 times daily or as directed. Preferred blood glucose meter OR supplies to accompany: Blood Glucose Monitor Brands: per insurance. 1 kit 0 10/24/2021 at Unknown time     butalbital-acetaminophen-caffeine (ESGIC) -40 MG tablet Take 1 tablet by mouth every 4 hours as needed for headaches 20 tablet 0 Past  Month at Unknown time     docusate sodium (COLACE) 100 MG capsule Take 1 capsule (100 mg) by mouth 2 times daily as needed for constipation   Past Week at Unknown time     EPINEPHrine (AUVI-Q) 0.3 MG/0.3ML injection 2-pack Inject 0.3 mLs (0.3 mg) into the muscle as needed for anaphylaxis 2 each 1 Unknown at Unknown time     Prenatal Vit-Fe Fumarate-FA (PNV FOLIC ACID + IRON) 27-1 MG TABS Take 1 tablet by mouth daily 90 tablet 3 10/24/2021 at      sertraline (ZOLOFT) 25 MG tablet Take 1 tablet (25 mg) by mouth daily 90 tablet 1 10/24/2021 at      blood glucose calibration (NO BRAND SPECIFIED) solution To accompany: Blood Glucose Monitor Brands: per insurance. 1 each 0      [DISCONTINUED] aspirin (ASA) 81 MG EC tablet Take 1 tablet (81 mg) by mouth daily 90 tablet 3 Past Week at Unknown time     [DISCONTINUED] labetalol (NORMODYNE) 100 MG tablet Take 3 tablets (300 mg) by mouth 2 times daily 360 tablet 1 10/24/2021 at      [DISCONTINUED] ondansetron (ZOFRAN-ODT) 4 MG ODT tab Take 1 tablet (4 mg) by mouth every 6 hours as needed for nausea or vomiting 10 tablet 1 Past Week at Unknown time             Discharge Medications:     Current Discharge Medication List      START taking these medications    Details   acetaminophen (TYLENOL) 325 MG tablet Take 3 tablets (975 mg) by mouth every 4 hours as needed for mild pain  Qty: 60 tablet, Refills: 0    Associated Diagnoses: S/P repeat low transverse       ibuprofen (ADVIL/MOTRIN) 200 MG tablet Take 3 tablets (600 mg) by mouth every 6 hours as needed for mild pain or moderate pain    Associated Diagnoses: S/P repeat low transverse       lanolin ointment Use as needed for dry, cracked or sore nipples. No need to wash off.    Associated Diagnoses: Postpartum care and examination of lactating mother         CONTINUE these medications which have CHANGED    Details   labetalol (NORMODYNE) 100 MG tablet Take 2 tablets (200 mg) by mouth 2 times  daily  Qty: 360 tablet, Refills: 1    Comments: Don't fill now, will call if needed.  Associated Diagnoses: Hypertension, goal below 140/90         CONTINUE these medications which have NOT CHANGED    Details   acetone urine (KETOSTIX) test strip Test with first void of the day for 7 days.  If negative or trace for 7 days, reduce testing to one time per week.  Qty: 50 strip, Refills: 1    Associated Diagnoses: Gestational diabetes mellitus (GDM) in third trimester, gestational diabetes method of control unspecified      !! alcohol swab prep pads Use to swab area of injection/carolina as directed.  Qty: 100 each, Refills: 3    Associated Diagnoses: Gestational diabetes mellitus (GDM) in third trimester, gestational diabetes method of control unspecified      !! alcohol swab prep pads Use to swab area of injection/carolina as directed.  Qty: 200 each, Refills: 1    Associated Diagnoses: Gestational diabetes mellitus (GDM) in third trimester, gestational diabetes method of control unspecified      blood glucose (NO BRAND SPECIFIED) lancets standard Use to test blood sugar 4-6 times daily or as directed.  Qty: 200 each, Refills: 1    Associated Diagnoses: Gestational diabetes mellitus (GDM) in third trimester, gestational diabetes method of control unspecified      blood glucose (NO BRAND SPECIFIED) test strip Use to test blood sugar 4-6 times daily or as directed.  Qty: 200 strip, Refills: 1    Associated Diagnoses: Gestational diabetes mellitus (GDM) in third trimester, gestational diabetes method of control unspecified      blood glucose monitoring (NO BRAND SPECIFIED) meter device kit Use to test blood sugar 4-6 times daily or as directed. Preferred blood glucose meter OR supplies to accompany: Blood Glucose Monitor Brands: per insurance.  Qty: 1 kit, Refills: 0    Associated Diagnoses: Gestational diabetes mellitus (GDM) in third trimester, gestational diabetes method of control unspecified       butalbital-acetaminophen-caffeine (ESGIC) -40 MG tablet Take 1 tablet by mouth every 4 hours as needed for headaches  Qty: 20 tablet, Refills: 0    Associated Diagnoses: Nonintractable episodic headache, unspecified headache type      docusate sodium (COLACE) 100 MG capsule Take 1 capsule (100 mg) by mouth 2 times daily as needed for constipation    Associated Diagnoses: Constipation, unspecified constipation type      EPINEPHrine (AUVI-Q) 0.3 MG/0.3ML injection 2-pack Inject 0.3 mLs (0.3 mg) into the muscle as needed for anaphylaxis  Qty: 2 each, Refills: 1    Associated Diagnoses: Anaphylaxis, subsequent encounter      Prenatal Vit-Fe Fumarate-FA (PNV FOLIC ACID + IRON) 27-1 MG TABS Take 1 tablet by mouth daily  Qty: 90 tablet, Refills: 3    Associated Diagnoses: Encounter for preconception consultation      sertraline (ZOLOFT) 25 MG tablet Take 1 tablet (25 mg) by mouth daily  Qty: 90 tablet, Refills: 1    Associated Diagnoses: Anxiety      blood glucose calibration (NO BRAND SPECIFIED) solution To accompany: Blood Glucose Monitor Brands: per insurance.  Qty: 1 each, Refills: 0    Associated Diagnoses: Gestational diabetes mellitus (GDM) in third trimester, gestational diabetes method of control unspecified       !! - Potential duplicate medications found. Please discuss with provider.      STOP taking these medications       aspirin (ASA) 81 MG EC tablet Comments:   Reason for Stopping:         ondansetron (ZOFRAN-ODT) 4 MG ODT tab Comments:   Reason for Stopping:                     Consultations:   No consultations were requested during this admission          Brief History of Labor or Admission:   Patient was admitted for planned repeat CS.  She went on to deliver a healthy baby boy without complications.  Please see delivery summary for full details.              Hospital Course:   The patient's hospital course was unremarkable.  She recovered as anticipated and experienced no post-operative  complications. On discharge, her pain was well controlled. Vaginal bleeding is minimal.  Voiding without difficulty. She did have initial urine retention which resolved after approximately 18 hours post-op.  Ambulating well and tolerating a normal diet.  No fever or significant wound drainage.  Breastfeeding well.  Infant is stable.  She was discharged on post-partum day #2 at her request.    Her blood pressures were mostly stable. She did have a few mildly elevated blood pressures and actually had a couple lower blood pressures as well. Her BP med had to be held one time, and her dose of labetalol was decreased at the time of discharge from 300 mg bid to 200 mg bid and will be followed as an outpatient.     On the day of discharge her exam is as follows:    Vitals:    10/26/21 2100 10/26/21 2347 10/27/21 0248 10/27/21 0900   BP: (!) 127/95 124/47 127/57 (!) 140/52   BP Location: Right arm      Cuff Size: Adult Large      Pulse: 75 81 82 84   Resp: 17 16  18   Temp: 97.5  F (36.4  C) 98.3  F (36.8  C)  98.1  F (36.7  C)   TempSrc: Oral Oral  Oral   SpO2: 97% 97%  98%   Height:          Vitals noted.  Patient alert, oriented, and in no acute distress. CV:  RRR without murmur. Respiratory:  Lungs clear to auscultation bilaterally. Abdomen:  Soft, tender with palpation of the uterine fundus which is firm and below the level of the umbilicus, nondistended with good bowel sounds and no masses or hepatosplenomegaly. Incision clean, dry, intact without any evidence for infection.  Extremities warm and dry without significant edema.       Post-partum hemoglobin:   Hemoglobin   Date Value Ref Range Status   10/26/2021 10.3 (L) 11.7 - 15.7 g/dL Final   03/22/2021 12.3 11.7 - 15.7 g/dL Final             Discharge Instructions and Follow-Up:   Discharge diet: Low salt and diabetic diet    Discharge activity: No heavy lifting for 6 week(s)  No driving for 2 week(s)  No sex for 6 week(s)   Discharge follow-up: Follow up with   Юлия in 6 days for incision check and blood pressure check, and in 6 weeks for postpartum check   Wound care: Keep incision clean and dry, may shower, don't submerse incision in water.            Discharge Disposition:   Discharged to home      Attestation:  I have reviewed today's vital signs, notes, medications, labs and imaging.    Rhoda Redman MD

## 2021-10-27 NOTE — PROGRESS NOTES
"S: Shift Update    B:  delivered via repeat  section with salpingectomy at 37 weeks gestation. Pregnancy complicated with diet controlled gestational diabetes, chronic hypertension (was taking 300mg labetalol twice a day during pregnancy), and anxiety (taking 25mg Zoloft). Maternal labs: A positive, GBS negative, Rubella immune, HIV non-reactive, and Hep B non-reactive. Abnormal antibody screen with presence of anti-C antibodies.    A: /57   Pulse 82   Temp 98.3  F (36.8  C) (Oral)   Resp 16   Ht 1.702 m (5' 7\")   LMP 2021 (Exact Date)   SpO2 97%   Breastfeeding Unknown   BMI 40.57 kg/m    Marie has been vitally stable this shift. Labetalol held this shift r/t DBP less than 70. Incision approximated and open to air without signs/symptoms of infection. Voiding without difficulty. Tolerating regular diet. [place] and attentive to infant needs. Independently performing self and infant cares in room. Proactively breastfeeding without need for reminders. SO Kurt at bedside this shift.    R: Patient would like to be discharged today. Continue postpartum cares.  "

## 2021-10-27 NOTE — PLAN OF CARE
S: Discharge  to home      B: Patient had a  delivery with no complications. Baby boy Baby's name Arsen, both breast and bottle(Similac Advance)}:24 Kcal  every 2 hours;  15-20 mls each feeding, will switch over to expressed breast milk to supplement.. Support person's name Spencer.     A: VSS, minimal pain using scheduled tylenol and ibuprofen( no narcotic use). Hgb10.3. BF independently nipples,inIcision intact without s/s infections, using interdry cloth under pannus fold.. Discussed incision care and warning signs to report. Denies any need for education.     R: All Discharge instructions reviewed and questions answered.  Belongings gathered and returned to the patient. Agreed to follow up in 1 weeks  and 6 weeks  or sooner with any question or concerns.     Nursing Discharge Checklist:    Pneumovax screened and given, if appropriate: N/A  Influenza vaccine screened and given, if appropriate: YES  Staples removed (): N/A  Breast milk returned: N/A  Hydrogel pads sent home:N/A  Birth Certificate Done: YES  Public Health Referral Made: N/A

## 2021-10-27 NOTE — DISCHARGE INSTRUCTIONS
[facility] Discharge Instructions     Discharge disposition:  Discharged to home       Diet:  Low sodium diet- and try to follow diabetic guidelines ongoing to reduce risk of future diabetes       Activity No heavy lifting for 6 week(s)  No driving for 2 week(s)  No sex for 6 week(s)       Follow-up: Follow up with Dr. Redman on 21 at 5:30 pm  We'll also set up a 6 week postpartum visit at that time.        Additional instructions: [facility] staff is available 24 hours 7 days for questions about you or your baby.  Please don't hesitate to call with any concerns.        Additional Patient Information:  Enjoy matty Leger!      Discharge Instructions for  Section ()  You had a  section, or . During the , your baby was delivered through an incision in your stomach and uterus. Full recovery after a  can take time. It s important to take care of yourself -- for your own sake and because your new baby needs you. Here are some guidelines to follow at home.  Incision care  Here's how to take care of your incision:    Shower as needed. Pat your incision dry.    Watch your incision for signs of infection, like more redness or drainage.    Hold a pillow against the incision when you laugh or cough and when you get up from a lying or sitting position.    Remember, it can take as long as 6 weeks for your incision to heal.  Activity  Here are some suggestions:    Don t try to take care of anyone other than your baby and yourself.    Remember, the more active you are, the more likely you are to have an increase in your bleeding.    Get lots of rest. Take naps in the afternoon.    Increase your activities bit by bit.    Plan your activities so that you don t have to go up or down stairs more than needed.    Do postsurgical deep breathing and coughing exercises. Ask your healthcare provider for instructions.    Don t lift  anything heavier than your baby until your healthcare provider tells you it s OK.    Don t drive until your healthcare provider says it s OK.    Don t have sexual intercourse until after you ve had a checkup with your healthcare provider and you have decided on a birth control method.    Allow others to do things for you. Don't hesitate to ask for help.  Follow-up  Make a follow-up appointment as directed by our staff.  When to call your healthcare provider  Call your healthcare provider right away if you have any of these:    Fever of 100.4 F (38 C) or higher    Redness, pain, or drainage at your incision site    Bleeding that requires a new sanitary pad every hour    Severe pain in the abdomen    Pain or urgency with urination    Foul odor from vaginal discharge    Trouble urinating or emptying your bladder    No bowel movement within 1 week after the birth of your baby    Swollen, red, painful area in the leg    Appearance of rash or hives    Sore, red, painful area on the breasts that may come with flu-like symptoms    Feelings of anxiety, panic, and/or depression  [employer] last reviewed this educational content on 2/1/2018 2000-2021 [employer]. All rights reserved. This information is not intended as a substitute for professional medical care. Always follow your healthcare professional's instructions.

## 2021-10-31 ENCOUNTER — TELEPHONE (OUTPATIENT)
Dept: OBGYN | Facility: CLINIC | Age: 31
End: 2021-10-31

## 2021-10-31 NOTE — TELEPHONE ENCOUNTER
Marie Yeboah  Gender: female  : 1990  [address] [facility] [address]  [phone] (home)   Medical Record: [id number]  Primary Care Provider: Rhoda Redman       [facility]   ?   Discharge Phone Call: Key Words/Key Times     How are you and the baby? Going good.    How are feedings going? Brst fdg with use of nipple shield q2-3 hr.    Voiding & Stooling? Lots    Any questions or concerns? Mom is concerned about her lack of appetite and her inability to feel an urge to void. She's just be urinating every couple hours even though she feels no urge. If she waits longer than two hours, she still feels no urge. She will be seeing KA on Tuesday and will mention these issues. Encouraged her to eat regularly even though she has no appetite. This will ensure she has enough calories for producing milk.    Follow-up appointment? Baby was seen last Friday. Baby and mom will be seen again on Tuesday, .      We want to provide excellent care here at [facility]. Do you have any feedback for us that would help us improve? No. You guys were great.    Call back COMMENTS:         Attempted Calls:   _________     __________

## 2021-11-02 ENCOUNTER — PRENATAL OFFICE VISIT (OUTPATIENT)
Dept: FAMILY MEDICINE | Facility: CLINIC | Age: 31
End: 2021-11-02
Payer: COMMERCIAL

## 2021-11-02 VITALS
TEMPERATURE: 98 F | WEIGHT: 244 LBS | RESPIRATION RATE: 14 BRPM | DIASTOLIC BLOOD PRESSURE: 80 MMHG | HEART RATE: 121 BPM | OXYGEN SATURATION: 98 % | BODY MASS INDEX: 38.22 KG/M2 | SYSTOLIC BLOOD PRESSURE: 122 MMHG

## 2021-11-02 DIAGNOSIS — Z98.891 S/P REPEAT LOW TRANSVERSE C-SECTION: Primary | ICD-10-CM

## 2021-11-02 DIAGNOSIS — I10 HYPERTENSION, GOAL BELOW 140/90: ICD-10-CM

## 2021-11-02 PROBLEM — O24.419 GESTATIONAL DIABETES MELLITUS (GDM) IN THIRD TRIMESTER, GESTATIONAL DIABETES METHOD OF CONTROL UNSPECIFIED: Status: RESOLVED | Noted: 2021-09-22 | Resolved: 2021-11-02

## 2021-11-02 PROBLEM — O09.893 SUPERVISION OF OTHER HIGH RISK PREGNANCIES, THIRD TRIMESTER: Status: RESOLVED | Noted: 2021-05-06 | Resolved: 2021-11-02

## 2021-11-02 PROBLEM — O09.299 HISTORY OF PRE-ECLAMPSIA IN PRIOR PREGNANCY, CURRENTLY PREGNANT: Status: RESOLVED | Noted: 2021-05-06 | Resolved: 2021-11-02

## 2021-11-02 PROCEDURE — 99207 PR PRENATAL VISIT: CPT | Performed by: FAMILY MEDICINE

## 2021-11-03 NOTE — PROGRESS NOTES
Marie Yeboah is a 31 year old  female here for follow up on her recent .  She needs an incision check.  Also needs a blood pressure recheck due to HTN.     OB History    Para Term  AB Living   6 3 2 1 3 3   SAB TAB Ectopic Multiple Live Births   3 0 0 0 3      # Outcome Date GA Lbr Murali/2nd Weight Sex Delivery Anes PTL Lv   6 Term 10/25/21 37w0d  2.53 kg (5 lb 9.2 oz) M CS-LTranv Spinal N TAMMY      Name: Arsen      Apgar1: 8  Apgar5: 9   5  16 36w5d  3.062 kg (6 lb 12 oz) M -SEC  N TAMMY      Complications: Gestational hypertension      Name: Shaheed   4 Term 04/02/15 37w0d  3.572 kg (7 lb 14 oz) M -SEC  N TAMMY      Complications: Failure to Progress in Second Stage, Preeclampsia      Name: Jhonny   3 2014 8w0d          2 2011 6w0d          1 2009 6w0d               She is doing well.  She denies bleeding or drainage from the incision. Pain is well controlled.    No fever.  She gets a little sensation while voiding, feels like something up near the bladder. Not really pain. Voiding fine, stooling fine.     O:  Vitals noted.  Patient alert, oriented, and in no acute distress. CV:  RRR without murmur.   Respiratory:  Lungs clear to auscultation bilaterally.  Her incision appears to be healing well without wound dehiscence, bleeding, erythema or drainage.      Extremities warm and dry without cyanosis, clubbing or edema.     A:  S/p        Wound check  Hypertension, stable.     P:  Continue routine postpartum check up at 8 weeks.  Continue on Labetalol 200 mg bid.   Will f/u sooner with any concerns, watch for redness, drainage, or warmth of the incision.  Wound care discussed.      Rhoda Redman MD

## 2021-11-24 ENCOUNTER — MYC MEDICAL ADVICE (OUTPATIENT)
Dept: FAMILY MEDICINE | Facility: CLINIC | Age: 31
End: 2021-11-24
Payer: COMMERCIAL

## 2021-11-24 DIAGNOSIS — I10 HYPERTENSION, GOAL BELOW 140/90: ICD-10-CM

## 2021-11-26 NOTE — TELEPHONE ENCOUNTER
Sticky note placed on PCP desk for review when she is back in clinic.  Magali Pierre, KEVANN, RN

## 2021-12-01 RX ORDER — LOSARTAN POTASSIUM 25 MG/1
25 TABLET ORAL DAILY
Qty: 90 TABLET | Refills: 1 | Status: SHIPPED | OUTPATIENT
Start: 2021-12-01 | End: 2022-05-13

## 2021-12-01 NOTE — TELEPHONE ENCOUNTER
[facility] Internal Medicine Progress Note    CC:  Patient presents with:  Ear Pain: bilateral ear pain - L worse than R - feels like ears are clogged      HPI:   HPI  Bilateral ear pain  Symptoms started about 1 week ago  Pt went to immediate ca See orders. See [employer] message to patient.   Rhoda Redman MD    facility-administered medications on file prior to visit. Review of Systems :  Review of Systems   Constitutional: Negative for chills and fever. HENT: Positive for ear pain and postnasal drip.  Negative for congestion, rhinorrhea, sinus pressure and Prescriptions Disp Refills   • methylPREDNISolone (MEDROL) 4 MG Oral Tablet Therapy Pack 1 kit 0     Sig: As directed. • Ipratropium Bromide 0.03 % Nasal Solution 1 Bottle 0     Si sprays by Nasal route every 12 (twelve) hours.        Imaging & Consul

## 2021-12-14 ENCOUNTER — PRENATAL OFFICE VISIT (OUTPATIENT)
Dept: FAMILY MEDICINE | Facility: CLINIC | Age: 31
End: 2021-12-14
Payer: COMMERCIAL

## 2021-12-14 VITALS
BODY MASS INDEX: 38.37 KG/M2 | SYSTOLIC BLOOD PRESSURE: 132 MMHG | WEIGHT: 245 LBS | HEART RATE: 113 BPM | TEMPERATURE: 98.6 F | DIASTOLIC BLOOD PRESSURE: 72 MMHG | OXYGEN SATURATION: 99 %

## 2021-12-14 DIAGNOSIS — Z98.891 S/P REPEAT LOW TRANSVERSE C-SECTION: ICD-10-CM

## 2021-12-14 DIAGNOSIS — I10 HYPERTENSION, GOAL BELOW 140/90: ICD-10-CM

## 2021-12-14 DIAGNOSIS — F41.9 ANXIETY: ICD-10-CM

## 2021-12-14 DIAGNOSIS — E66.01 MORBID OBESITY (H): ICD-10-CM

## 2021-12-14 DIAGNOSIS — F33.1 MODERATE EPISODE OF RECURRENT MAJOR DEPRESSIVE DISORDER (H): ICD-10-CM

## 2021-12-14 LAB — HGB BLD-MCNC: 14 G/DL (ref 11.7–15.7)

## 2021-12-14 PROCEDURE — 99207 PR POST PARTUM EXAM: CPT | Performed by: FAMILY MEDICINE

## 2021-12-14 PROCEDURE — 36415 COLL VENOUS BLD VENIPUNCTURE: CPT | Performed by: FAMILY MEDICINE

## 2021-12-14 RX ORDER — SERTRALINE HYDROCHLORIDE 25 MG/1
25 TABLET, FILM COATED ORAL DAILY
Qty: 90 TABLET | Refills: 1 | Status: SHIPPED | OUTPATIENT
Start: 2021-12-14 | End: 2022-05-13

## 2021-12-14 ASSESSMENT — PAIN SCALES - GENERAL: PAINLEVEL: NO PAIN (0)

## 2021-12-14 NOTE — PROGRESS NOTES
Marie is here for a 6-week postpartum checkup.    She had a repeat c/s of a viable boy, weight 5 pounds 9 oz., with Gestational Diabetes - diet controlled complications. Date of delivery was 10/25/21. Since delivery, she has not anymore been breast feeding.  She has no signs of infection, bleeding or other complications.  She is not pregnant.  We discussed contraceptions and she has chosen tubal ligation.      Post partum tubal: Yes  History of Gestational Diabetes? Yes  Type of Delivery:    Feeding Method:  Formula  If initiated breast feeding and stopped, how long did you breast feed?:  month    REVIEW OF SYSTEMS:  Ears/Nose/Throat: negative  Respiratory: negative  Cardiovascular: negative  Gastrointestinal: negative  Genitourinary: negative  Musculoskeletal: negative    Neurologic: negative   Skin: negative   Endocrine:  negative  Psych:negative for postpartum depression      Past Medical History:   Diagnosis Date     Anxiety      ASCUS with positive high risk HPV cervical 2008 (age 18) per Care Everywhere. NIL paps in '16, '17 & '20     Depressive disorder      Gestational diabetes mellitus (GDM) in third trimester, gestational diabetes method of control unspecified 2021     HTN (hypertension)        Past Surgical History:   Procedure Laterality Date     C/SECTION, LOW TRANSVERSE      ,       SECTION, TUBAL LIGATION, COMBINED Bilateral 10/25/2021    Procedure:  SECTION  with bilateral salpingectomy;  Surgeon: Rajinder Leblanc MD;  Location: [facility]     COMBINED  SECTION, SALPINGECTOMY BILATERAL Bilateral 10/25/2021     HC REMOVAL OF TONSILS,<11 Y/O         Family History   Problem Relation Age of Onset     Hypertension Mother      Seizure Disorder Mother      Unknown/Adopted Mother      Hypertension Father      Heart Disease Father      Hyperlipidemia Father      Anxiety Disorder Father      No Known Problems Sister      No Known  Problems Sister      Diabetes Paternal Grandmother      Brain Tumor Paternal Grandfather      Prostate Cancer Paternal Grandfather      No Known Problems Son      No Known Problems Son            EXAM:  /72   Pulse 113   Temp 98.6  F (37  C) (Temporal)   Wt 111.1 kg (245 lb)   LMP 2020   SpO2 99%   BMI 38.37 kg/m    HEENT: grossly normal.  NECK: no lymphadenopathy or thyroidomegaly.  LUNGS: CTA X 2, no rales or crackles.  BACK: No spinal or CVA tenderness.  HEART: RRR without murmurs clicks or gallops.  ABDOMEN: soft, non tender, good bowel sounds, without masses rebound, guarding or tenderness.  INCISION: Pfannenstiel incision appears to be healing nicely  PELVIC:  Deferred.     EXTREMITIES:  warm to touch, good pulses, no ankle edema or calf tenderness.  NEUROLOGIC: grossly normal.    ASSESSMENT:   6-week postpartum exam after repeat c/s.    ICD-10-CM    1. Routine postpartum follow-up  Z39.2 OB HEMOGLOBIN     OB HEMOGLOBIN   2. S/P repeat low transverse   Z98.891    3. Hypertension, goal below 140/90  I10    4. Anxiety  F41.9 sertraline (ZOLOFT) 25 MG tablet   5. Moderate episode of recurrent major depressive disorder (H)  F33.1    6. Obesity (BMI 35.0-39.9) with comorbidity (H)  E66.01         PLAN:    Discussed calcium intake, vitamins and supplements  Exercise encouraged  Follow up in 6 months for BP recheck.   Hemoglobin obtained due to anemia.   Weight loss recommended.  She wants to wait on COVID vaccine.     Her BP has been well controlled on losartan, will continue.   Refilled her sertraline.   Rhoda Redman MD
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
## 2022-01-21 PROBLEM — F33.1 MODERATE EPISODE OF RECURRENT MAJOR DEPRESSIVE DISORDER (H): Status: ACTIVE | Noted: 2022-01-21

## 2022-03-11 ENCOUNTER — MYC MEDICAL ADVICE (OUTPATIENT)
Dept: FAMILY MEDICINE | Facility: CLINIC | Age: 32
End: 2022-03-11
Payer: COMMERCIAL

## 2022-03-11 DIAGNOSIS — T75.3XXD MOTION SICKNESS, SUBSEQUENT ENCOUNTER: Primary | ICD-10-CM

## 2022-03-16 RX ORDER — SCOLOPAMINE TRANSDERMAL SYSTEM 1 MG/1
1 PATCH, EXTENDED RELEASE TRANSDERMAL
Qty: 3 PATCH | Refills: 0 | Status: SHIPPED | OUTPATIENT
Start: 2022-03-16 | End: 2022-05-13

## 2022-03-28 ENCOUNTER — APPOINTMENT (OUTPATIENT)
Dept: URGENT CARE | Facility: CLINIC | Age: 32
End: 2022-03-28
Payer: COMMERCIAL

## 2022-04-18 ENCOUNTER — APPOINTMENT (OUTPATIENT)
Dept: CT IMAGING | Facility: CLINIC | Age: 32
End: 2022-04-18
Attending: PHYSICIAN ASSISTANT
Payer: COMMERCIAL

## 2022-04-18 ENCOUNTER — HOSPITAL ENCOUNTER (EMERGENCY)
Facility: CLINIC | Age: 32
Discharge: HOME | End: 2022-04-18
Attending: PHYSICIAN ASSISTANT | Admitting: PHYSICIAN ASSISTANT
Payer: COMMERCIAL

## 2022-04-18 VITALS
TEMPERATURE: 97.8 F | RESPIRATION RATE: 18 BRPM | SYSTOLIC BLOOD PRESSURE: 143 MMHG | BODY MASS INDEX: 37.51 KG/M2 | HEART RATE: 81 BPM | WEIGHT: 239 LBS | DIASTOLIC BLOOD PRESSURE: 85 MMHG | OXYGEN SATURATION: 99 % | HEIGHT: 67 IN

## 2022-04-18 DIAGNOSIS — R42 VERTIGO: ICD-10-CM

## 2022-04-18 DIAGNOSIS — R11.2 NAUSEA WITH VOMITING: ICD-10-CM

## 2022-04-18 LAB
ALBUMIN SERPL-MCNC: 4 G/DL (ref 3.4–5)
ALP SERPL-CCNC: 51 U/L (ref 40–150)
ALT SERPL W P-5'-P-CCNC: 25 U/L (ref 0–50)
ANION GAP SERPL CALCULATED.3IONS-SCNC: 5 MMOL/L (ref 3–14)
AST SERPL W P-5'-P-CCNC: 19 U/L (ref 0–45)
BASOPHILS # BLD AUTO: 0 10E3/UL (ref 0–0.2)
BASOPHILS NFR BLD AUTO: 0 %
BILIRUB SERPL-MCNC: 0.3 MG/DL (ref 0.2–1.3)
BUN SERPL-MCNC: 17 MG/DL (ref 7–30)
CALCIUM SERPL-MCNC: 8.7 MG/DL (ref 8.5–10.1)
CHLORIDE BLD-SCNC: 106 MMOL/L (ref 94–109)
CO2 SERPL-SCNC: 25 MMOL/L (ref 20–32)
CREAT SERPL-MCNC: 0.64 MG/DL (ref 0.52–1.04)
EOSINOPHIL # BLD AUTO: 0.1 10E3/UL (ref 0–0.7)
EOSINOPHIL NFR BLD AUTO: 1 %
ERYTHROCYTE [DISTWIDTH] IN BLOOD BY AUTOMATED COUNT: 13.2 % (ref 10–15)
GFR SERPL CREATININE-BSD FRML MDRD: >90 ML/MIN/1.73M2
GLUCOSE BLD-MCNC: 95 MG/DL (ref 70–99)
HCT VFR BLD AUTO: 40 % (ref 35–47)
HGB BLD-MCNC: 13.7 G/DL (ref 11.7–15.7)
IMM GRANULOCYTES # BLD: 0.1 10E3/UL
IMM GRANULOCYTES NFR BLD: 1 %
LYMPHOCYTES # BLD AUTO: 1.9 10E3/UL (ref 0.8–5.3)
LYMPHOCYTES NFR BLD AUTO: 20 %
MCH RBC QN AUTO: 28 PG (ref 26.5–33)
MCHC RBC AUTO-ENTMCNC: 34.3 G/DL (ref 31.5–36.5)
MCV RBC AUTO: 82 FL (ref 78–100)
MONOCYTES # BLD AUTO: 0.5 10E3/UL (ref 0–1.3)
MONOCYTES NFR BLD AUTO: 5 %
NEUTROPHILS # BLD AUTO: 6.7 10E3/UL (ref 1.6–8.3)
NEUTROPHILS NFR BLD AUTO: 73 %
NRBC # BLD AUTO: 0 10E3/UL
NRBC BLD AUTO-RTO: 0 /100
PLATELET # BLD AUTO: 238 10E3/UL (ref 150–450)
POTASSIUM BLD-SCNC: 3.7 MMOL/L (ref 3.4–5.3)
PROT SERPL-MCNC: 7.9 G/DL (ref 6.8–8.8)
RBC # BLD AUTO: 4.9 10E6/UL (ref 3.8–5.2)
SODIUM SERPL-SCNC: 136 MMOL/L (ref 133–144)
TROPONIN I SERPL HS-MCNC: 10 NG/L
WBC # BLD AUTO: 9.2 10E3/UL (ref 4–11)

## 2022-04-18 PROCEDURE — 36416 COLLJ CAPILLARY BLOOD SPEC: CPT | Performed by: PHYSICIAN ASSISTANT

## 2022-04-18 PROCEDURE — 85025 COMPLETE CBC W/AUTO DIFF WBC: CPT | Performed by: PHYSICIAN ASSISTANT

## 2022-04-18 PROCEDURE — 250N000013 HC RX MED GY IP 250 OP 250 PS 637: Performed by: PHYSICIAN ASSISTANT

## 2022-04-18 PROCEDURE — 80053 COMPREHEN METABOLIC PANEL: CPT | Performed by: PHYSICIAN ASSISTANT

## 2022-04-18 PROCEDURE — 93005 ELECTROCARDIOGRAM TRACING: CPT | Performed by: PHYSICIAN ASSISTANT

## 2022-04-18 PROCEDURE — 99285 EMERGENCY DEPT VISIT HI MDM: CPT | Mod: 25 | Performed by: PHYSICIAN ASSISTANT

## 2022-04-18 PROCEDURE — 84484 ASSAY OF TROPONIN QUANT: CPT | Performed by: PHYSICIAN ASSISTANT

## 2022-04-18 PROCEDURE — 36415 COLL VENOUS BLD VENIPUNCTURE: CPT | Performed by: PHYSICIAN ASSISTANT

## 2022-04-18 PROCEDURE — 93010 ELECTROCARDIOGRAM REPORT: CPT | Performed by: PHYSICIAN ASSISTANT

## 2022-04-18 PROCEDURE — 70450 CT HEAD/BRAIN W/O DYE: CPT

## 2022-04-18 RX ORDER — MECLIZINE HYDROCHLORIDE 25 MG/1
25-50 TABLET ORAL 4 TIMES DAILY PRN
Qty: 30 TABLET | Refills: 0 | Status: SHIPPED | OUTPATIENT
Start: 2022-04-18 | End: 2023-10-10

## 2022-04-18 RX ORDER — MECLIZINE HYDROCHLORIDE 25 MG/1
50 TABLET ORAL ONCE
Status: COMPLETED | OUTPATIENT
Start: 2022-04-18 | End: 2022-04-18

## 2022-04-18 RX ADMIN — MECLIZINE HYDROCHLORIDE 50 MG: 25 TABLET ORAL at 16:41

## 2022-04-18 NOTE — DISCHARGE INSTRUCTIONS
I am glad you are feeling better.  Your work-up today was very reassuring.  I think you are experiencing a peripheral vertigo.  Use the meclizine prescribed as needed if symptoms get worse.  You can try Zofran or Benadryl for nausea as well.  A referral to physical therapy was provided, they should call to schedule this for you at your convenience.  They should help to definitively manage your vertigo.  If you do have any worsening symptoms in the meantime please do not hesitate to return to the emergency department.    Thank you for choosing [facility]'s Emergency Department. It was a pleasure taking care of you today. If you have any questions, please call [phone].    Lilia Jones PA-C

## 2022-04-18 NOTE — ED PROVIDER NOTES
History     Chief Complaint   Patient presents with     Dizziness     Nausea & Vomiting       HPI  Marie Yeboah is a 32 year old female who presents to the emergency department via EMS for concerns of nausea, vomiting, and dizziness. The patient around 3 PM today she developed a diffusely hot sensation in her body and subsequent dizziness, lightheadedness, nausea and vomiting.  She took a Zofran and tried to lay down but vomited again.  Tried a second Zofran but again vomited.  She eventually called 911 due to symptoms.  She describes the dizziness as an unsteady motion sickness sensation.  She did feel lightheaded like she could pass out at times during this episode of vomiting.  EMS gave her 8 mg IV Zofran without relief but after 50 mg IV Benadryl nausea was greatly improved.  Currently she reports still feeling dizzy with some motion sickness but denies any nausea.  She denies any associated headache with her symptoms but does recall last night waking up with a bad migraine.  She took her Esgic and was able to go back to sleep and woke up without a headache.  She denies any visual changes, numbness or weakness anywhere.  She has been able to ambulate since symptoms started.  She has never experienced this before.  She just got back from [place] for a family vacation this last week.  No recent fevers.  Denies associated chest pain or shortness of breath.        Allergies:  Allergies   Allergen Reactions     Blood Transfusion Related (Informational Only) Other (See Comments)     Patient has a history of a clinically significant antibody against RBC antigens.  A delay in compatible RBCs may occur.      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     Priority: Medium     Anti-C diagnosed at time of delivery       Pyelectasis of fetus on prenatal ultrasound 2021     Priority: Medium     Nonintractable episodic headache, unspecified headache type 2021     Priority: Medium     Anxiety 2021     Priority: Medium     Anaphylaxis 2020     Priority: Medium     Rhinoconjunctivitis 2020     Priority: Medium     Segmental dysfunction of cervical region 2019     Priority: Medium     Segmental dysfunction of thoracic region 2019     Priority: Medium     Segmental dysfunction of lumbar region 2019     Priority: Medium     Segmental dysfunction of sacral region 2019     Priority: Medium     Sprain of ligaments of cervical spine 2019     Priority: Medium     Hypertension, goal below 140/90 10/12/2018     Priority: Medium     Obesity (BMI 35.0-39.9) with comorbidity (H) 10/12/2018     Priority: Medium        Past Medical History:    Past Medical History:   Diagnosis Date     Anxiety      ASCUS with positive high risk HPV cervical 2008     Depressive disorder      Gestational diabetes mellitus (GDM) in third trimester, gestational diabetes method of control unspecified 2021     HTN (hypertension)        Past Surgical History:    Past Surgical History:   Procedure Laterality Date     C/SECTION, LOW TRANSVERSE      ,       SECTION, TUBAL LIGATION, COMBINED Bilateral 10/25/2021    Procedure:  SECTION  with bilateral salpingectomy;  Surgeon: Rajinder Leblanc MD;  Location: [facility]     COMBINED  SECTION, SALPINGECTOMY BILATERAL Bilateral 10/25/2021     HC REMOVAL OF TONSILS,<13 Y/O         Family History:    Family History   Problem Relation Age of Onset     Hypertension Mother      Seizure Disorder Mother      Unknown/Adopted Mother      Hypertension Father      Heart Disease Father       "Hyperlipidemia Father      Anxiety Disorder Father      No Known Problems Sister      No Known Problems Sister      Diabetes Paternal Grandmother      Brain Tumor Paternal Grandfather      Prostate Cancer Paternal Grandfather      No Known Problems Son      No Known Problems Son        Social History:  Marital Status:   [2]  Social History     Tobacco Use     Smoking status: Former Smoker     Years: 5.00     Types: Cigarettes     Start date: 1/1/2007     Smokeless tobacco: Never Used     Tobacco comment: cigarettes socially/ twice a month   Vaping Use     Vaping Use: Never used   Substance Use Topics     Alcohol use: Yes     Alcohol/week: 2.0 standard drinks     Types: 1 Glasses of wine, 1 Cans of beer per week     Comment: 2 drinks a week     Drug use: No        Medications:    butalbital-acetaminophen-caffeine (ESGIC) -40 MG tablet  losartan (COZAAR) 25 MG tablet  meclizine (ANTIVERT) 25 MG tablet  scopolamine (TRANSDERM) 1 MG/3DAYS 72 hr patch  sertraline (ZOLOFT) 25 MG tablet  acetaminophen (TYLENOL) 325 MG tablet  docusate sodium (COLACE) 100 MG capsule  EPINEPHrine (AUVI-Q) 0.3 MG/0.3ML injection 2-pack  ibuprofen (ADVIL/MOTRIN) 200 MG tablet  Prenatal Vit-Fe Fumarate-FA (PNV FOLIC ACID + IRON) 27-1 MG TABS          Review of Systems   All other systems reviewed and are negative.      Physical Exam   BP: 138/78  Pulse: 74  Temp: 97.8  F (36.6  C)  Resp: 20  Height: 170.2 cm (5' 7\")  Weight: 108.4 kg (239 lb)  SpO2: 100 %      Physical Exam  Vitals and nursing note reviewed.   Constitutional:       General: She is not in acute distress.     Appearance: Normal appearance. She is well-developed. She is not ill-appearing, toxic-appearing or diaphoretic.   HENT:      Head: Normocephalic and atraumatic.      Right Ear: Tympanic membrane normal.      Left Ear: Tympanic membrane normal.      Nose: Nose normal.      Mouth/Throat:      Mouth: Mucous membranes are moist.      Pharynx: Oropharynx is clear. No " oropharyngeal exudate or posterior oropharyngeal erythema.   Eyes:      Extraocular Movements: Extraocular movements intact.      Conjunctiva/sclera: Conjunctivae normal.      Pupils: Pupils are equal, round, and reactive to light.      Comments: Very mild horizontal nystagmus with leftward gaze   Cardiovascular:      Rate and Rhythm: Normal rate and regular rhythm.      Heart sounds: Normal heart sounds.   Pulmonary:      Effort: Pulmonary effort is normal. No respiratory distress.      Breath sounds: Normal breath sounds.   Abdominal:      General: Bowel sounds are normal. There is no distension.      Palpations: Abdomen is soft.      Tenderness: There is no abdominal tenderness. There is no right CVA tenderness or left CVA tenderness.   Musculoskeletal:         General: No deformity.      Cervical back: Neck supple.   Skin:     General: Skin is warm and dry.   Neurological:      General: No focal deficit present.      Mental Status: She is alert and oriented to person, place, and time.      Cranial Nerves: Cranial nerves are intact. No cranial nerve deficit.      Sensory: Sensation is intact.      Motor: Motor function is intact. No weakness.      Coordination: Coordination is intact. Coordination normal. Finger-Nose-Finger Test normal.   Psychiatric:         Mood and Affect: Mood normal.         Behavior: Behavior normal.         ED Course           Procedures           EKG Interpretation:      Interpreted by Lilia Jones PA-C  Time reviewed: 1700  Symptoms at time of EKG: dizziness   Rhythm: normal sinus   Rate: normal  Axis: normal  Ectopy: none  Conduction: normal  ST Segments/ T Waves: No ST-T wave changes  Q Waves: none  Comparison to prior: Unchanged    Clinical Impression: normal EKG      Results for orders placed or performed during the hospital encounter of 04/18/22 (from the past 24 hour(s))   CT Head w/o Contrast    Narrative    EXAM: CT HEAD W/O CONTRAST  LOCATION: [facility]  MEDICAL CENTER  DATE/TIME: 4/18/2022 4:56 PM    INDICATION: Dizziness, non-specific  COMPARISON: None.  TECHNIQUE: Routine CT Head without IV contrast. Multiplanar reformats. Dose reduction techniques were used.    FINDINGS:  INTRACRANIAL CONTENTS: No intracranial hemorrhage, extraaxial collection, or mass effect.  No CT evidence of acute infarct. Normal parenchymal attenuation. Normal ventricles and sulci.     VISUALIZED ORBITS/SINUSES/MASTOIDS: No intraorbital abnormality. No paranasal sinus mucosal disease. No middle ear or mastoid effusion.    BONES/SOFT TISSUES: No acute abnormality.      Impression    IMPRESSION:  1.  No acute intracranial abnormality.   Comprehensive metabolic panel   Result Value Ref Range    Sodium 136 133 - 144 mmol/L    Potassium 3.7 3.4 - 5.3 mmol/L    Chloride 106 94 - 109 mmol/L    Carbon Dioxide (CO2) 25 20 - 32 mmol/L    Anion Gap 5 3 - 14 mmol/L    Urea Nitrogen 17 7 - 30 mg/dL    Creatinine 0.64 0.52 - 1.04 mg/dL    Calcium 8.7 8.5 - 10.1 mg/dL    Glucose 95 70 - 99 mg/dL    Alkaline Phosphatase 51 40 - 150 U/L    AST 19 0 - 45 U/L    ALT 25 0 - 50 U/L    Protein Total 7.9 6.8 - 8.8 g/dL    Albumin 4.0 3.4 - 5.0 g/dL    Bilirubin Total 0.3 0.2 - 1.3 mg/dL    GFR Estimate >90 >60 mL/min/1.73m2   Troponin I   Result Value Ref Range    Troponin I High Sensitivity 10 <54 ng/L   CBC with platelets differential    Narrative    The following orders were created for panel order CBC with platelets differential.  Procedure                               Abnormality         Status                     ---------                               -----------         ------                     CBC with platelets and d...[[id number]]                      Final result                 Please view results for these tests on the individual orders.   CBC with platelets and differential   Result Value Ref Range    WBC Count 9.2 4.0 - 11.0 10e3/uL    RBC Count 4.90 3.80 - 5.20 10e6/uL    Hemoglobin 13.7 11.7 -  15.7 g/dL    Hematocrit 40.0 35.0 - 47.0 %    MCV 82 78 - 100 fL    MCH 28.0 26.5 - 33.0 pg    MCHC 34.3 31.5 - 36.5 g/dL    RDW 13.2 10.0 - 15.0 %    Platelet Count 238 150 - 450 10e3/uL    % Neutrophils 73 %    % Lymphocytes 20 %    % Monocytes 5 %    % Eosinophils 1 %    % Basophils 0 %    % Immature Granulocytes 1 %    NRBCs per 100 WBC 0 <1 /100    Absolute Neutrophils 6.7 1.6 - 8.3 10e3/uL    Absolute Lymphocytes 1.9 0.8 - 5.3 10e3/uL    Absolute Monocytes 0.5 0.0 - 1.3 10e3/uL    Absolute Eosinophils 0.1 0.0 - 0.7 10e3/uL    Absolute Basophils 0.0 0.0 - 0.2 10e3/uL    Absolute Immature Granulocytes 0.1 <=0.4 10e3/uL    Absolute NRBCs 0.0 10e3/uL       Medications   meclizine (ANTIVERT) tablet 50 mg (50 mg Oral Given 4/18/22 9211)          Assessments & Plan (with Medical Decision Making)  Marie Yeboah is a 32 year old female who presented to the ED via EMS complaining of dizziness with nausea and vomiting.  Started abruptly around 3 PM.  No history of symptoms similar to this.  Did have a headache last night that resolved with Esgic.  No associated chest pain or shortness of breath.  On arrival to the ED her vital signs were within normal limits.  Reported nausea and vomiting had resolved with IV Benadryl given by EMS.  Still had motion sickness type dizziness.  She was noted to have some mild nystagmus with leftward gaze on exam but otherwise no acute abnormalities.  No neurological deficits.  I suspect symptoms are likely related to peripheral vertigo.  She is overall very low risk for central nervous system cause and she has no acute neurological deficits.  Given headache however last night that was reported as more severe than usual, we will get a CT of her head to rule out any intracranial process.  We will also get EKG and labs to evaluate for any potential cardiac component.  Patient was given oral meclizine here which she reported helped her symptoms greatly.  Her EKG showed normal sinus rhythm  without ischemic changes or dysrhythmias.  Labs today showed demonstrated a normal white count and hemoglobin, normal CMP, and normal troponin.  Head CT was also unremarkable.  On reassessment, patient reported that she still felt nearly asymptomatic in regard to the dizziness.  She was overall reassured by work-up today.  I discussed with her that clinically I suspect a peripheral vertigo like BPPV.  I discussed management options with her and she was agreeable to a PT referral for further evaluation and management.  I will also prescribe her a course of meclizine to use as needed for recurrent dizziness.  Advised use of Zofran or Benadryl for nausea at home.  She was given instructions on when to return to the ED.  All questions answered and patient discharged home in suitable condition.     I have reviewed the nursing notes.    I have reviewed the findings, diagnosis, plan and need for follow up with the patient.    New Prescriptions    MECLIZINE (ANTIVERT) 25 MG TABLET    Take 1-2 tablets (25-50 mg) by mouth 4 times daily as needed for dizziness       Final diagnoses:   Vertigo   Nausea with vomiting     Note: Chart documentation done in part with Dragon Voice Recognition software. Although reviewed after completion, some word and grammatical errors may remain.     4/18/2022   [facility] EMERGENCY DEPT     Lilia Jones PA-C  04/18/22 0459

## 2022-04-18 NOTE — ED TRIAGE NOTES
Pt arrives via EMS and reports being dizzy, N/V and diaphoretic. BS on home meter 96. Received 8mg Zofran with emesis shortly after, IV Benadryl 50 mg from EMS. 20 G PIV started to left hand by EMS.

## 2022-04-27 ENCOUNTER — HOSPITAL ENCOUNTER (OUTPATIENT)
Dept: PHYSICAL THERAPY | Facility: CLINIC | Age: 32
Setting detail: THERAPIES SERIES
Discharge: HOME | End: 2022-04-27
Attending: PHYSICIAN ASSISTANT
Payer: COMMERCIAL

## 2022-04-27 DIAGNOSIS — R42 VERTIGO: ICD-10-CM

## 2022-04-27 PROCEDURE — 97161 PT EVAL LOW COMPLEX 20 MIN: CPT | Mod: GP | Performed by: PHYSICAL THERAPIST

## 2022-04-27 PROCEDURE — 97112 NEUROMUSCULAR REEDUCATION: CPT | Mod: GP | Performed by: PHYSICAL THERAPIST
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
## 2022-05-03 NOTE — PROGRESS NOTES
Doing ok, but having more headaches. Needs refill of her butalbital, which hasn't refilled since May (qty 20).   Her BP has been running higher, about similar to today. Today after lunch was 156/98, then after 1 hour it dropped to 144/80.   Had BPP today, scored 8/8.   She did not pass her 3 hour GTT, will refer for diabetes ed, discussed management in pregnancy. See supplies order.   For her BP, will increase her labetalol to 200 mg bid and update me before end of the week.   She states that her work is not that stressful, and soon she will be starting working from home time. We may need to put her on bed rest or have her start working from home sooner if that helps keep her blood pressure under control. Her preeclampsia labs have been good, but if her blood pressure does not respond to the higher dose will need to repeat labs. I don't suspect preeclampsia at this time although she is at increased risk due to previous preeclampsia. No acute neuro signs. We did discuss the potential for early induction if needed. We'll recheck with her in a couple days or sooner as needed.  Rhoda Redman MD    no

## 2022-05-13 ENCOUNTER — OFFICE VISIT (OUTPATIENT)
Dept: FAMILY MEDICINE | Facility: CLINIC | Age: 32
End: 2022-05-13
Payer: COMMERCIAL

## 2022-05-13 VITALS
SYSTOLIC BLOOD PRESSURE: 138 MMHG | HEART RATE: 119 BPM | DIASTOLIC BLOOD PRESSURE: 74 MMHG | TEMPERATURE: 97.6 F | BODY MASS INDEX: 37.77 KG/M2 | WEIGHT: 241.13 LBS | OXYGEN SATURATION: 99 %

## 2022-05-13 DIAGNOSIS — F41.9 ANXIETY: ICD-10-CM

## 2022-05-13 DIAGNOSIS — I10 HYPERTENSION, GOAL BELOW 140/90: Primary | ICD-10-CM

## 2022-05-13 DIAGNOSIS — T78.2XXD ANAPHYLAXIS, SUBSEQUENT ENCOUNTER: ICD-10-CM

## 2022-05-13 PROCEDURE — 99213 OFFICE O/P EST LOW 20 MIN: CPT | Performed by: FAMILY MEDICINE

## 2022-05-13 RX ORDER — LOSARTAN POTASSIUM 50 MG/1
50 TABLET ORAL DAILY
Qty: 90 TABLET | Refills: 3 | Status: SHIPPED | OUTPATIENT
Start: 2022-05-13 | End: 2023-08-21

## 2022-05-13 RX ORDER — EPINEPHRINE 0.3 MG/.3ML
0.3 INJECTION SUBCUTANEOUS PRN
Qty: 2 EACH | Refills: 1 | Status: SHIPPED | OUTPATIENT
Start: 2022-05-13 | End: 2023-10-10

## 2022-05-13 ASSESSMENT — PATIENT HEALTH QUESTIONNAIRE - PHQ9
SUM OF ALL RESPONSES TO PHQ QUESTIONS 1-9: 4
10. IF YOU CHECKED OFF ANY PROBLEMS, HOW DIFFICULT HAVE THESE PROBLEMS MADE IT FOR YOU TO DO YOUR WORK, TAKE CARE OF THINGS AT HOME, OR GET ALONG WITH OTHER PEOPLE: SOMEWHAT DIFFICULT
SUM OF ALL RESPONSES TO PHQ QUESTIONS 1-9: 4

## 2022-05-13 ASSESSMENT — ANXIETY QUESTIONNAIRES
3. WORRYING TOO MUCH ABOUT DIFFERENT THINGS: SEVERAL DAYS
8. IF YOU CHECKED OFF ANY PROBLEMS, HOW DIFFICULT HAVE THESE MADE IT FOR YOU TO DO YOUR WORK, TAKE CARE OF THINGS AT HOME, OR GET ALONG WITH OTHER PEOPLE?: SOMEWHAT DIFFICULT
2. NOT BEING ABLE TO STOP OR CONTROL WORRYING: SEVERAL DAYS
GAD7 TOTAL SCORE: 8
1. FEELING NERVOUS, ANXIOUS, OR ON EDGE: SEVERAL DAYS
GAD7 TOTAL SCORE: 8
5. BEING SO RESTLESS THAT IT IS HARD TO SIT STILL: MORE THAN HALF THE DAYS
6. BECOMING EASILY ANNOYED OR IRRITABLE: MORE THAN HALF THE DAYS
4. TROUBLE RELAXING: SEVERAL DAYS
7. FEELING AFRAID AS IF SOMETHING AWFUL MIGHT HAPPEN: NOT AT ALL
GAD7 TOTAL SCORE: 8
7. FEELING AFRAID AS IF SOMETHING AWFUL MIGHT HAPPEN: NOT AT ALL

## 2022-05-13 ASSESSMENT — PAIN SCALES - GENERAL: PAINLEVEL: NO PAIN (0)

## 2022-05-13 NOTE — PROGRESS NOTES
"  Assessment & Plan       ICD-10-CM    1. Hypertension, goal below 140/90  I10 losartan (COZAAR) 50 MG tablet     Basic metabolic panel  (Ca, Cl, CO2, Creat, Gluc, K, Na, BUN)   2. Anxiety  F41.9 sertraline (ZOLOFT) 50 MG tablet   3. Anaphylaxis, subsequent encounter  T78.2XXD EPINEPHrine (AUVI-Q) 0.3 MG/0.3ML injection 2-pack      I'm going to raise her dose of losartan to 50 mg daily and continue to have her monitor her blood pressure.  We will have her stop for basic metabolic panel after a few weeks.  I am also going to raise her dose of sertraline to 50 mg daily.  If doing well she can continue on this dose but will follow-up with me if its not helpful.  I also refilled her EpiPen.  Continue with annual physical in 6 months.   15 minutes spent on the date of the encounter doing chart review, history and exam, documentation and further activities per the note     BMI:   Estimated body mass index is 37.77 kg/m  as calculated from the following:    Height as of 4/18/22: 1.702 m (5' 7\").    Weight as of this encounter: 109.4 kg (241 lb 2 oz).   Weight management plan: Discussed healthy diet and exercise guidelines    No follow-ups on file.    Rhoda Redman MD  [facility] JAYESH Salazar is a 32 year old who presents for the following health issues  accompanied by her sons    History of Present Illness       Mental Health Follow-up:                    Today's PHQ-9         PHQ-9 Total Score: 4  PHQ-9 Q9 Thoughts of better off dead/self-harm past 2 weeks :   (P) Not at all    How difficult have these problems made it for you to do your work, take care of things at home, or get along with other people: Somewhat difficult    Today's DERECK-7 Score: 8    Hypertension: She presents for follow up of hypertension.  She does not check blood pressure  regularly outside of the clinic. Outpatient blood pressures have not been over 140/90. She follows a low salt diet.     She eats 2-3 " servings of fruits and vegetables daily.She consumes 0 sweetened beverage(s) daily.She exercises with enough effort to increase her heart rate 10 to 19 minutes per day.  She exercises with enough effort to increase her heart rate 4 days per week.   She is taking medications regularly.     She does check her blood pressure periodically and it runs typically in the 130s over 70s.  She is taking her losartan 25 mg daily    She is also taking her sertraline 25 mg daily.  Her anxiety is minimally controlled.  She does not feel concerned about depression.    Review of Systems   Constitutional, HEENT, cardiovascular, pulmonary, gi and gu systems are negative, except as otherwise noted.      Objective    /74   Pulse 119   Temp 97.6  F (36.4  C) (Temporal)   Wt 109.4 kg (241 lb 2 oz)   SpO2 99%   BMI 37.77 kg/m    Body mass index is 37.77 kg/m .  Physical Exam   Vitals noted.  Patient alert, oriented, and in no acute distress.   Neck:  Supple without lymphadenopathy, JVD or masses.   CV:  RRR without murmur.   Respiratory:  Lungs clear to auscultation bilaterally.   She is casually dressed and well groomed. She makes good eye contact, has normal speech and affect.    Extremities warm and dry without cyanosis, clubbing or edema.

## 2022-05-14 ASSESSMENT — ANXIETY QUESTIONNAIRES: GAD7 TOTAL SCORE: 8

## 2022-05-14 ASSESSMENT — PATIENT HEALTH QUESTIONNAIRE - PHQ9: SUM OF ALL RESPONSES TO PHQ QUESTIONS 1-9: 4

## 2022-08-29 ENCOUNTER — MYC REFILL (OUTPATIENT)
Dept: FAMILY MEDICINE | Facility: CLINIC | Age: 32
End: 2022-08-29

## 2022-08-29 DIAGNOSIS — R51.9 NONINTRACTABLE EPISODIC HEADACHE, UNSPECIFIED HEADACHE TYPE: ICD-10-CM

## 2022-08-30 ENCOUNTER — E-VISIT (OUTPATIENT)
Dept: FAMILY MEDICINE | Facility: CLINIC | Age: 32
End: 2022-08-30
Payer: COMMERCIAL

## 2022-08-30 DIAGNOSIS — J20.9 ACUTE BRONCHITIS WITH SYMPTOMS > 10 DAYS: Primary | ICD-10-CM

## 2022-08-30 PROCEDURE — 99421 OL DIG E/M SVC 5-10 MIN: CPT | Performed by: FAMILY MEDICINE

## 2022-08-30 RX ORDER — AZITHROMYCIN 250 MG/1
TABLET, FILM COATED ORAL
Qty: 6 TABLET | Refills: 0 | Status: SHIPPED | OUTPATIENT
Start: 2022-08-30 | End: 2022-09-04

## 2022-08-31 RX ORDER — BUTALBITAL, ACETAMINOPHEN AND CAFFEINE 50; 325; 40 MG/1; MG/1; MG/1
1 TABLET ORAL EVERY 4 HOURS PRN
Qty: 20 TABLET | Refills: 0 | Status: SHIPPED | OUTPATIENT
Start: 2022-08-31 | End: 2023-10-10

## 2022-08-31 NOTE — PATIENT INSTRUCTIONS
"    Dear Marie Yeboah    After reviewing your responses, I've been able to diagnose you with \"Bronchitis\" which is a common infection of your lungs. While this is most commonly caused by a virus, the symptoms you have given suggest you should be treated with antibiotics.     I have sent Z-pack to your pharmacy to treat this infection.     It is important that you take all of your prescribed medication even if your symptoms are improving after a few doses. Taking all of your medicine helps prevent the symptoms from returning.     If your symptoms worsen, you develop chest pain or shortness of breath, fevers over 101, or are not improving in 5 days, please contact your primary care provider for an appointment or visit any of our convenient Walk-in Care or Urgent Care Centers to be seen which can be found on our website here.    Thanks again for choosing us as your health care partner,    Rhoda Redman MD    "

## 2022-11-20 ENCOUNTER — HEALTH MAINTENANCE LETTER (OUTPATIENT)
Age: 32
End: 2022-11-20

## 2022-12-11 NOTE — TELEPHONE ENCOUNTER
FUTURE VISIT INFORMATION      FUTURE VISIT INFORMATION:    Date: 2/22/2023    Time: 230pm    Location: [facility] [place]  REFERRAL INFORMATION:    Referring provider:  Self     Referring providers clinic:      Reason for visit/diagnosis  Migraines     RECORDS REQUESTED FROM:       Clinic name Comments Records Status Imaging Status   Internal ED Visit-4/18/2022    Dr. Cooper-11/20/2020    CT Head-4/18/2022 Epic PACS

## 2023-02-22 ENCOUNTER — PRE VISIT (OUTPATIENT)
Dept: NEUROLOGY | Facility: CLINIC | Age: 33
End: 2023-02-22

## 2023-04-15 ENCOUNTER — HEALTH MAINTENANCE LETTER (OUTPATIENT)
Age: 33
End: 2023-04-15

## 2023-08-20 DIAGNOSIS — F41.9 ANXIETY: ICD-10-CM

## 2023-08-20 DIAGNOSIS — I10 HYPERTENSION, GOAL BELOW 140/90: ICD-10-CM

## 2023-08-21 RX ORDER — LOSARTAN POTASSIUM 50 MG/1
50 TABLET ORAL DAILY
Qty: 90 TABLET | Refills: 0 | Status: SHIPPED | OUTPATIENT
Start: 2023-08-21 | End: 2023-10-10

## 2023-10-03 ASSESSMENT — ENCOUNTER SYMPTOMS
BREAST MASS: 0
HEARTBURN: 0
EYE PAIN: 0
MYALGIAS: 0
SORE THROAT: 0
DIZZINESS: 0
SHORTNESS OF BREATH: 0
FREQUENCY: 0
NAUSEA: 0
HEMATURIA: 0
PARESTHESIAS: 0
ARTHRALGIAS: 0
JOINT SWELLING: 0
HEADACHES: 1
CONSTIPATION: 0
ABDOMINAL PAIN: 0
HEMATOCHEZIA: 0
CHILLS: 0
WEAKNESS: 0
DYSURIA: 0
PALPITATIONS: 0
DIARRHEA: 0
FEVER: 0
NERVOUS/ANXIOUS: 1
COUGH: 0

## 2023-10-10 ENCOUNTER — OFFICE VISIT (OUTPATIENT)
Dept: FAMILY MEDICINE | Facility: CLINIC | Age: 33
End: 2023-10-10
Payer: COMMERCIAL

## 2023-10-10 ENCOUNTER — TELEPHONE (OUTPATIENT)
Dept: FAMILY MEDICINE | Facility: CLINIC | Age: 33
End: 2023-10-10

## 2023-10-10 VITALS
SYSTOLIC BLOOD PRESSURE: 138 MMHG | TEMPERATURE: 97.8 F | HEIGHT: 67 IN | BODY MASS INDEX: 40.24 KG/M2 | HEART RATE: 82 BPM | DIASTOLIC BLOOD PRESSURE: 82 MMHG | OXYGEN SATURATION: 99 % | WEIGHT: 256.38 LBS | RESPIRATION RATE: 18 BRPM

## 2023-10-10 DIAGNOSIS — T78.2XXD ANAPHYLAXIS, SUBSEQUENT ENCOUNTER: ICD-10-CM

## 2023-10-10 DIAGNOSIS — E66.813 CLASS 3 SEVERE OBESITY DUE TO EXCESS CALORIES WITHOUT SERIOUS COMORBIDITY WITH BODY MASS INDEX (BMI) OF 40.0 TO 44.9 IN ADULT (H): ICD-10-CM

## 2023-10-10 DIAGNOSIS — F33.1 MODERATE EPISODE OF RECURRENT MAJOR DEPRESSIVE DISORDER (H): ICD-10-CM

## 2023-10-10 DIAGNOSIS — E66.01 CLASS 3 SEVERE OBESITY DUE TO EXCESS CALORIES WITHOUT SERIOUS COMORBIDITY WITH BODY MASS INDEX (BMI) OF 40.0 TO 44.9 IN ADULT (H): ICD-10-CM

## 2023-10-10 DIAGNOSIS — R51.9 NONINTRACTABLE EPISODIC HEADACHE, UNSPECIFIED HEADACHE TYPE: ICD-10-CM

## 2023-10-10 DIAGNOSIS — I10 HYPERTENSION, GOAL BELOW 140/90: ICD-10-CM

## 2023-10-10 DIAGNOSIS — Z00.00 ROUTINE GENERAL MEDICAL EXAMINATION AT A HEALTH CARE FACILITY: Primary | ICD-10-CM

## 2023-10-10 DIAGNOSIS — F41.9 ANXIETY: ICD-10-CM

## 2023-10-10 DIAGNOSIS — Z13.6 CARDIOVASCULAR SCREENING; LDL GOAL LESS THAN 160: ICD-10-CM

## 2023-10-10 DIAGNOSIS — Z12.4 CERVICAL CANCER SCREENING: ICD-10-CM

## 2023-10-10 PROBLEM — H10.9 RHINOCONJUNCTIVITIS: Status: RESOLVED | Noted: 2020-09-23 | Resolved: 2023-10-10

## 2023-10-10 PROBLEM — J31.0 RHINOCONJUNCTIVITIS: Status: RESOLVED | Noted: 2020-09-23 | Resolved: 2023-10-10

## 2023-10-10 PROBLEM — Z98.891 S/P REPEAT LOW TRANSVERSE C-SECTION: Status: RESOLVED | Noted: 2021-10-25 | Resolved: 2023-10-10

## 2023-10-10 PROBLEM — R76.8 RED BLOOD CELL ANTIBODY POSITIVE: Status: RESOLVED | Noted: 2021-10-25 | Resolved: 2023-10-10

## 2023-10-10 PROBLEM — O35.EXX0 PYELECTASIS OF FETUS ON PRENATAL ULTRASOUND: Status: RESOLVED | Noted: 2021-07-06 | Resolved: 2023-10-10

## 2023-10-10 LAB
ALBUMIN SERPL BCG-MCNC: 4.5 G/DL (ref 3.5–5.2)
ALP SERPL-CCNC: 65 U/L (ref 35–104)
ALT SERPL W P-5'-P-CCNC: 24 U/L (ref 0–50)
ANION GAP SERPL CALCULATED.3IONS-SCNC: 13 MMOL/L (ref 7–15)
AST SERPL W P-5'-P-CCNC: 23 U/L (ref 0–45)
BILIRUB SERPL-MCNC: 0.2 MG/DL
BUN SERPL-MCNC: 13.9 MG/DL (ref 6–20)
CALCIUM SERPL-MCNC: 9.4 MG/DL (ref 8.6–10)
CHLORIDE SERPL-SCNC: 101 MMOL/L (ref 98–107)
CREAT SERPL-MCNC: 0.71 MG/DL (ref 0.51–0.95)
DEPRECATED HCO3 PLAS-SCNC: 25 MMOL/L (ref 22–29)
EGFRCR SERPLBLD CKD-EPI 2021: >90 ML/MIN/1.73M2
GLUCOSE SERPL-MCNC: 98 MG/DL (ref 70–99)
HBA1C MFR BLD: 5.6 %
POTASSIUM SERPL-SCNC: 4.1 MMOL/L (ref 3.4–5.3)
PROT SERPL-MCNC: 7.4 G/DL (ref 6.4–8.3)
SODIUM SERPL-SCNC: 139 MMOL/L (ref 135–145)
TSH SERPL DL<=0.005 MIU/L-ACNC: 1.47 UIU/ML (ref 0.3–4.2)

## 2023-10-10 PROCEDURE — 80053 COMPREHEN METABOLIC PANEL: CPT | Performed by: FAMILY MEDICINE

## 2023-10-10 PROCEDURE — G0145 SCR C/V CYTO,THINLAYER,RESCR: HCPCS | Performed by: FAMILY MEDICINE

## 2023-10-10 PROCEDURE — 99213 OFFICE O/P EST LOW 20 MIN: CPT | Mod: 25 | Performed by: FAMILY MEDICINE

## 2023-10-10 PROCEDURE — 83036 HEMOGLOBIN GLYCOSYLATED A1C: CPT | Performed by: FAMILY MEDICINE

## 2023-10-10 PROCEDURE — 87624 HPV HI-RISK TYP POOLED RSLT: CPT | Performed by: FAMILY MEDICINE

## 2023-10-10 PROCEDURE — 84443 ASSAY THYROID STIM HORMONE: CPT | Performed by: FAMILY MEDICINE

## 2023-10-10 PROCEDURE — 99395 PREV VISIT EST AGE 18-39: CPT | Performed by: FAMILY MEDICINE

## 2023-10-10 PROCEDURE — 36415 COLL VENOUS BLD VENIPUNCTURE: CPT | Performed by: FAMILY MEDICINE

## 2023-10-10 RX ORDER — BUTALBITAL, ACETAMINOPHEN AND CAFFEINE 50; 325; 40 MG/1; MG/1; MG/1
1 TABLET ORAL EVERY 4 HOURS PRN
Qty: 20 TABLET | Refills: 0 | Status: SHIPPED | OUTPATIENT
Start: 2023-10-10

## 2023-10-10 RX ORDER — EPINEPHRINE 0.3 MG/.3ML
0.3 INJECTION SUBCUTANEOUS PRN
Qty: 2 EACH | Refills: 1 | Status: SHIPPED | OUTPATIENT
Start: 2023-10-10

## 2023-10-10 RX ORDER — LOSARTAN POTASSIUM 50 MG/1
50 TABLET ORAL DAILY
Qty: 90 TABLET | Refills: 3 | Status: SHIPPED | OUTPATIENT
Start: 2023-10-10

## 2023-10-10 ASSESSMENT — ENCOUNTER SYMPTOMS
DIZZINESS: 0
ARTHRALGIAS: 0
CHILLS: 0
DYSURIA: 0
PALPITATIONS: 0
SHORTNESS OF BREATH: 0
CONSTIPATION: 0
ABDOMINAL PAIN: 0
FREQUENCY: 0
MYALGIAS: 0
BREAST MASS: 0
HEADACHES: 1
HEARTBURN: 0
DIARRHEA: 0
FEVER: 0
HEMATOCHEZIA: 0
PARESTHESIAS: 0
NERVOUS/ANXIOUS: 1
EYE PAIN: 0
HEMATURIA: 0
COUGH: 0
JOINT SWELLING: 0
WEAKNESS: 0
SORE THROAT: 0
NAUSEA: 0

## 2023-10-10 ASSESSMENT — PATIENT HEALTH QUESTIONNAIRE - PHQ9
10. IF YOU CHECKED OFF ANY PROBLEMS, HOW DIFFICULT HAVE THESE PROBLEMS MADE IT FOR YOU TO DO YOUR WORK, TAKE CARE OF THINGS AT HOME, OR GET ALONG WITH OTHER PEOPLE: SOMEWHAT DIFFICULT
SUM OF ALL RESPONSES TO PHQ QUESTIONS 1-9: 7
SUM OF ALL RESPONSES TO PHQ QUESTIONS 1-9: 7

## 2023-10-10 NOTE — PROGRESS NOTES
SUBJECTIVE:   CC: Marie is an 33 year old who presents for preventive health visit.       10/10/2023    12:43 PM   Additional Questions   Roomed by Kiley LEE MA       Healthy Habits:     Getting at least 3 servings of Calcium per day:  Yes    Bi-annual eye exam:  Yes    Dental care twice a year:  NO    Sleep apnea or symptoms of sleep apnea:  None    Diet:  Regular (no restrictions)    Frequency of exercise:  2-3 days/week    Duration of exercise:  15-30 minutes    Taking medications regularly:  Yes    Medication side effects:  None    Additional concerns today:  Yes      Today's PHQ-9 Score:       10/10/2023    12:40 PM   PHQ-9 SCORE   PHQ-9 Total Score MyChart 7 (Mild depression)   PHQ-9 Total Score 7     Have you ever done Advance Care Planning? (For example, a Health Directive, POLST, or a discussion with a medical provider or your loved ones about your wishes): No, advance care planning information given to patient to review.  Patient plans to discuss their wishes with loved ones or provider.      Social History     Tobacco Use    Smoking status: Former     Packs/day: 0.00     Years: 5.00     Pack years: 0.00     Types: Cigarettes     Start date: 1/1/2007    Smokeless tobacco: Never    Tobacco comments:     Very little use now, maybe once a month   Substance Use Topics    Alcohol use: Yes     Types: 1 Glasses of wine, 1 Cans of beer per week     Comment: 2 drinks a week             10/3/2023     9:02 AM   Alcohol Use   Prescreen: >3 drinks/day or >7 drinks/week? No     Reviewed orders with patient.  Reviewed health maintenance and updated orders accordingly - Yes  BP Readings from Last 3 Encounters:   10/10/23 138/82   05/13/22 138/74   04/18/22 (!) 143/85    Wt Readings from Last 3 Encounters:   10/10/23 116.3 kg (256 lb 6 oz)   05/13/22 109.4 kg (241 lb 2 oz)   04/18/22 108.4 kg (239 lb)                  Patient Active Problem List   Diagnosis    Hypertension, goal below 140/90    Class 3 severe  obesity due to excess calories without serious comorbidity with body mass index (BMI) of 40.0 to 44.9 in adult (H)    Segmental dysfunction of cervical region    Segmental dysfunction of thoracic region    Segmental dysfunction of lumbar region    Segmental dysfunction of sacral region    Sprain of ligaments of cervical spine    Anaphylaxis    Nonintractable episodic headache, unspecified headache type    Anxiety    Moderate episode of recurrent major depressive disorder (H)    CARDIOVASCULAR SCREENING; LDL GOAL LESS THAN 160     Past Surgical History:   Procedure Laterality Date    C/SECTION, LOW TRANSVERSE      ,      SECTION, TUBAL LIGATION, COMBINED Bilateral 10/25/2021    Procedure:  SECTION  with bilateral salpingectomy;  Surgeon: Rajinder Leblanc MD;  Location: [facility]    COMBINED  SECTION, SALPINGECTOMY BILATERAL Bilateral 10/25/2021    HC REMOVAL OF TONSILS,<13 Y/O         Social History     Tobacco Use    Smoking status: Former     Packs/day: 0.00     Years: 5.00     Pack years: 0.00     Types: Cigarettes     Start date: 2007    Smokeless tobacco: Never    Tobacco comments:     Very little use now, maybe once a month   Substance Use Topics    Alcohol use: Yes     Types: 1 Glasses of wine, 1 Cans of beer per week     Comment: 2 drinks a week     Family History   Problem Relation Age of Onset    Hypertension Mother     Seizure Disorder Mother     Unknown/Adopted Mother     Hypertension Father     Heart Disease Father     Hyperlipidemia Father     Anxiety Disorder Father     No Known Problems Sister     No Known Problems Sister     Diabetes Paternal Grandmother     Brain Tumor Paternal Grandfather     Prostate Cancer Paternal Grandfather     No Known Problems Son     No Known Problems Son            Breast Cancer Screening:    FHS-7:        No data to display              History of abnormal Pap smear: NO - age 30- 65 PAP every 3 years recommended      10/27/2020  "    4:42 PM   PAP / HPV   PAP (Historical) NIL      Reviewed and updated as needed this visit by clinical staff   Tobacco  Allergies  Meds              Reviewed and updated as needed this visit by Provider                     Review of Systems   Constitutional:  Negative for chills and fever.   HENT:  Positive for congestion. Negative for ear pain, hearing loss and sore throat.    Eyes:  Negative for pain and visual disturbance.   Respiratory:  Negative for cough and shortness of breath.    Cardiovascular:  Negative for chest pain, palpitations and peripheral edema.   Gastrointestinal:  Negative for abdominal pain, constipation, diarrhea, heartburn, hematochezia and nausea.   Breasts:  Negative for tenderness, breast mass and discharge.   Genitourinary:  Negative for dysuria, frequency, genital sores, hematuria, pelvic pain, urgency, vaginal bleeding and vaginal discharge.   Musculoskeletal:  Negative for arthralgias, joint swelling and myalgias.   Skin:  Negative for rash.   Neurological:  Positive for headaches. Negative for dizziness, weakness and paresthesias.   Psychiatric/Behavioral:  Negative for mood changes. The patient is nervous/anxious.      Overall doing well but struggling with her weight. She is trying to eat healthier, does well briefly but then gets off track. Overall eats well but isn't following a specific diet, probably eats more carbs than she should. She did well in the past with keto diet but gained all her weight back when she stopped it.     She is taking losartan for her blood pressure control and overall is doing a good job but does not check her blood pressure at home.  She misses her dose about once a week if she goes out of town to go up to the cabin or something on the weekend.    She has normal regular periods, not too heavy.       OBJECTIVE:   /82   Pulse 82   Temp 97.8  F (36.6  C) (Temporal)   Resp 18   Ht 1.705 m (5' 7.13\")   Wt 116.3 kg (256 lb 6 oz)   LMP " 10/05/2023 (Approximate)   SpO2 99%   BMI 40.00 kg/m    Physical Exam  GENERAL: healthy, alert and no distress  EYES: Eyes grossly normal to inspection, PERRL and conjunctivae and sclerae normal  HENT: ear canals and TM's normal, nose and mouth without ulcers or lesions  NECK: no adenopathy, no asymmetry, masses, or scars and thyroid normal to palpation, no bruits.  RESP: lungs clear to auscultation - no rales, rhonchi or wheezes  BREAST: normal without masses, tenderness or nipple discharge and no palpable axillary masses or adenopathy  CV: regular rate and rhythm, normal S1 S2, no S3 or S4, no murmur, click or rub, no peripheral edema and peripheral pulses strong  ABDOMEN: soft, nontender, no hepatosplenomegaly, no masses and bowel sounds normal, just a small subcutaneous nodule in the epigastrium suggestive of a small lipoma, roughly 1-1/2 cm in size.   (female): Vaginal exam reveals normal external and internal genitalia.  Cervix is closed, long and thick.  No lesions or abnormalities seen.  Pap co-test collected. Bimanual exam reveals a nontender, nongravid uterus with no CMT.  No adnexal masses or tenderness.     MS: no gross musculoskeletal defects noted, no edema  SKIN: no suspicious lesions or rashes  NEURO: Normal strength and tone, mentation intact and speech normal  PSYCH: mentation appears normal, affect normal/bright      ASSESSMENT/PLAN:       ICD-10-CM    1. Routine general medical examination at a health care facility  Z00.00       2. Class 3 severe obesity due to excess calories without serious comorbidity with body mass index (BMI) of 40.0 to 44.9 in adult (H)  E66.01 semaglutide (OZEMPIC) 2 MG/3ML pen    Z68.41 semaglutide (OZEMPIC) 2 MG/3ML pen     Comprehensive metabolic panel (BMP + Alb, Alk Phos, ALT, AST, Total. Bili, TP)     Hemoglobin A1c     TSH with free T4 reflex      3. Hypertension, goal below 140/90  I10 losartan (COZAAR) 50 MG tablet     Comprehensive metabolic panel (BMP + Alb,  Alk Phos, ALT, AST, Total. Bili, TP)     TSH with free T4 reflex      4. Anxiety  F41.9 sertraline (ZOLOFT) 50 MG tablet      5. Moderate episode of recurrent major depressive disorder (H)  F33.1       6. Nonintractable episodic headache, unspecified headache type  R51.9 butalbital-acetaminophen-caffeine (ESGIC) -40 MG tablet      7. Anaphylaxis, subsequent encounter  T78.2XXD EPINEPHrine (AUVI-Q) 0.3 MG/0.3ML injection 2-pack      8. Cervical cancer screening  Z12.4 Pap Screen with HPV - recommended age 30 - 65 years      9. CARDIOVASCULAR SCREENING; LDL GOAL LESS THAN 160  Z13.6 Lipid panel reflex to direct LDL Fasting          Patient has been advised of split billing requirements and indicates understanding: Yes  I recommend a yearly physical, monthly self breast exam, Pap smear every 3 years if normal or per ACOG guidelines.    We talked a lot about weight control.  We talked about the pros and cons of weight loss medications.  She has already tried phentermine and keto diet.  Did not do well with phentermine.  Keto diet worked for a while but then she gained it back.  She asks about Ozempic and would like to take that if possible.  I advised her that there can be some GI side effects and risks for gastroparesis in a small percentage of people, it is sometimes not covered by insurance, and does not work ongoing after stopping the medication.  It is only successful if used in combination with a healthy lifestyle and dietary changes.  I highly encouraged her to look into programs such as weight watchers, Noom, Shaye, or other structured diet.  She is interested in weight watchers.  I gave her extensive information on that program and she is going to look into it  In the meantime we will also try her on Ozempic, 0.25 mg weekly for the first 4 weeks then increase to 0.5 mg weekly for the next 4 weeks.  She will be following up with me next month with her son for well-child check and will address her weight  "loss at that time.  If not covered then we will have to try for prior approval or just stick with weight watchers for the time being.    I renewed her medications, no change in her losartan or sertraline doses.  Would like her to start checking her blood pressure once weekly at home and follow-up if not at goal.  Weight loss will definitely help this.  Even without weight loss, healthier diet and more exercise will likely help drop her blood pressure as well.    COUNSELING:  Reviewed preventive health counseling, as reflected in patient instructions  Special attention given to:        Regular exercise       Healthy diet/nutrition       Vision screening       Immunizations  Declined: Covid-19 and Influenza            Contraception - surgical    BMI:   Estimated body mass index is 40 kg/m  as calculated from the following:    Height as of this encounter: 1.705 m (5' 7.13\").    Weight as of this encounter: 116.3 kg (256 lb 6 oz).   Weight management plan: as above      She reports that she has quit smoking. Her smoking use included cigarettes. She started smoking about 16 years ago. She has never used smokeless tobacco.          Rhoda Redman MD  [facility] submitted by the patient for this visit:  Patient Health Questionnaire (Submitted on 10/10/2023)  If you checked off any problems, how difficult have these problems made it for you to do your work, take care of things at home, or get along with other people?: Somewhat difficult  PHQ9 TOTAL SCORE: 7    "

## 2023-10-11 NOTE — TELEPHONE ENCOUNTER
PRIOR AUTHORIZATION DENIED    Medication: SEMAGLUTIDE(0.25 OR 0.5MG/DOS) 2 MG/3ML SC SOPN  Insurance Company: [employer] Non-Specialty PA's - Phone [phone] Fax [phone]  Denial Date: 10/10/2023  Denial Rational:     Appeal Information:     Patient Notified: NO

## 2023-10-11 NOTE — RESULT ENCOUNTER NOTE
Marie,   Here are your results:  Thyroid is perfect.  Comprehensive panel is all normal including liver, kidneys, blood sugar and electrolytes.  Diabetes test is still normal but has risen to the border.  So hopefully this will be even more motivation to make some healthy changes and prevent diabetes.  Rhoda Redman MD

## 2023-10-12 ENCOUNTER — TELEPHONE (OUTPATIENT)
Dept: FAMILY MEDICINE | Facility: CLINIC | Age: 33
End: 2023-10-12
Payer: COMMERCIAL

## 2023-10-12 NOTE — TELEPHONE ENCOUNTER
PA needed on ozempic   Insurance:paid/medco  Ins. Phone: [phone]  Patient ID: [id number]  PCN: N/a  BIN: [id number]    Please let us know when PA is granted/denied. Thank You      [facility], [employer]

## 2023-10-13 LAB
BKR LAB AP GYN ADEQUACY: NORMAL
BKR LAB AP GYN INTERPRETATION: NORMAL
BKR LAB AP HPV REFLEX: NORMAL
BKR LAB AP PREVIOUS ABNORMAL: NORMAL
PATH REPORT.COMMENTS IMP SPEC: NORMAL
PATH REPORT.COMMENTS IMP SPEC: NORMAL
PATH REPORT.RELEVANT HX SPEC: NORMAL

## 2023-10-17 LAB
HUMAN PAPILLOMA VIRUS 16 DNA: NEGATIVE
HUMAN PAPILLOMA VIRUS 18 DNA: NEGATIVE
HUMAN PAPILLOMA VIRUS FINAL DIAGNOSIS: NORMAL
HUMAN PAPILLOMA VIRUS OTHER HR: NEGATIVE

## 2023-10-18 ENCOUNTER — MYC MEDICAL ADVICE (OUTPATIENT)
Dept: FAMILY MEDICINE | Facility: CLINIC | Age: 33
End: 2023-10-18
Payer: COMMERCIAL

## 2023-10-18 DIAGNOSIS — E66.813 CLASS 3 SEVERE OBESITY DUE TO EXCESS CALORIES WITHOUT SERIOUS COMORBIDITY WITH BODY MASS INDEX (BMI) OF 40.0 TO 44.9 IN ADULT (H): Primary | ICD-10-CM

## 2023-10-18 DIAGNOSIS — I10 HYPERTENSION, GOAL BELOW 140/90: ICD-10-CM

## 2023-10-18 DIAGNOSIS — E66.01 CLASS 3 SEVERE OBESITY DUE TO EXCESS CALORIES WITHOUT SERIOUS COMORBIDITY WITH BODY MASS INDEX (BMI) OF 40.0 TO 44.9 IN ADULT (H): Primary | ICD-10-CM

## 2023-10-27 ENCOUNTER — LAB (OUTPATIENT)
Dept: LAB | Facility: CLINIC | Age: 33
End: 2023-10-27
Payer: COMMERCIAL

## 2023-10-27 DIAGNOSIS — E66.01 CLASS 3 SEVERE OBESITY DUE TO EXCESS CALORIES WITHOUT SERIOUS COMORBIDITY WITH BODY MASS INDEX (BMI) OF 40.0 TO 44.9 IN ADULT (H): ICD-10-CM

## 2023-10-27 DIAGNOSIS — Z13.6 CARDIOVASCULAR SCREENING; LDL GOAL LESS THAN 160: ICD-10-CM

## 2023-10-27 DIAGNOSIS — E66.813 CLASS 3 SEVERE OBESITY DUE TO EXCESS CALORIES WITHOUT SERIOUS COMORBIDITY WITH BODY MASS INDEX (BMI) OF 40.0 TO 44.9 IN ADULT (H): ICD-10-CM

## 2023-10-27 LAB
CHOLEST SERPL-MCNC: 182 MG/DL
HDLC SERPL-MCNC: 37 MG/DL
LDLC SERPL CALC-MCNC: 124 MG/DL
NONHDLC SERPL-MCNC: 145 MG/DL
TRIGL SERPL-MCNC: 106 MG/DL

## 2023-10-27 PROCEDURE — 36415 COLL VENOUS BLD VENIPUNCTURE: CPT

## 2023-10-27 PROCEDURE — 80061 LIPID PANEL: CPT

## 2023-10-30 NOTE — RESULT ENCOUNTER NOTE
"Marie, here is your cholesterol panel result. Overall this isn't bad. The worst number is your HDL which is too low. I'd like to see this come up quite a bit, our goal is to get over 50. This is your \"good\" cholesterol, and more is usually better. Regular aerobic exercise will help that most of all.   Rhoda Redman MD "

## 2023-11-01 ENCOUNTER — TELEPHONE (OUTPATIENT)
Dept: FAMILY MEDICINE | Facility: CLINIC | Age: 33
End: 2023-11-01
Payer: COMMERCIAL

## 2023-11-01 NOTE — TELEPHONE ENCOUNTER
Prior Authorization Retail Medication Request    Medication/Dose: Wegovy  ICD code (if different than what is on RX):    Previously Tried and Failed:    Rationale:      Insurance Name:  [facility]/[employer]  Insurance ID:  [id number]      Pharmacy Information (if different than what is on RX)  Name:  [facility]  Phone:  [phone]      Thank you,  Ya Rachel, Pharmacy Tech  [facility]

## 2023-11-03 NOTE — TELEPHONE ENCOUNTER
Prior Authorization Approval    Medication: SEMAGLUTIDE-WEIGHT MANAGEMENT 0.25 MG/0.5ML SC SOAJ  Authorization Effective Date: 10/4/2023  Authorization Expiration Date: 5/31/2024  Approved Dose/Quantity:   Reference #: [id number]   Insurance Company: [employer] Non-Specialty PA's - Phone [phone] Fax [phone]  Which Pharmacy is filling the prescription: [facility] [address]   Pharmacy Notified: y  Patient Notified: y - pharmacy to notify

## 2023-11-27 ENCOUNTER — E-VISIT (OUTPATIENT)
Dept: FAMILY MEDICINE | Facility: CLINIC | Age: 33
End: 2023-11-27
Payer: COMMERCIAL

## 2023-11-27 DIAGNOSIS — R19.7 DIARRHEA OF PRESUMED INFECTIOUS ORIGIN: Primary | ICD-10-CM

## 2023-11-27 PROCEDURE — 99421 OL DIG E/M SVC 5-10 MIN: CPT | Performed by: FAMILY MEDICINE

## 2023-12-07 NOTE — PATIENT INSTRUCTIONS
Thank you for choosing us for your care. Given your symptoms, I would like you to do a lab-only visit to determine what is causing them.  I have placed the orders.  Please schedule an [place], or call [phone].  I will let you know when the results are back and next steps to take.

## 2024-02-27 ENCOUNTER — MYC MEDICAL ADVICE (OUTPATIENT)
Dept: FAMILY MEDICINE | Facility: CLINIC | Age: 34
End: 2024-02-27
Payer: COMMERCIAL

## 2024-02-27 DIAGNOSIS — E66.813 CLASS 3 SEVERE OBESITY DUE TO EXCESS CALORIES WITHOUT SERIOUS COMORBIDITY WITH BODY MASS INDEX (BMI) OF 40.0 TO 44.9 IN ADULT (H): ICD-10-CM

## 2024-02-27 DIAGNOSIS — E66.01 CLASS 3 SEVERE OBESITY DUE TO EXCESS CALORIES WITHOUT SERIOUS COMORBIDITY WITH BODY MASS INDEX (BMI) OF 40.0 TO 44.9 IN ADULT (H): ICD-10-CM

## 2024-02-27 DIAGNOSIS — T75.3XXD MOTION SICKNESS, SUBSEQUENT ENCOUNTER: Primary | ICD-10-CM

## 2024-02-27 RX ORDER — SCOLOPAMINE TRANSDERMAL SYSTEM 1 MG/1
1 PATCH, EXTENDED RELEASE TRANSDERMAL
Qty: 4 PATCH | Refills: 2 | Status: SHIPPED | OUTPATIENT
Start: 2024-02-27

## 2024-03-28 ENCOUNTER — MYC MEDICAL ADVICE (OUTPATIENT)
Dept: FAMILY MEDICINE | Facility: CLINIC | Age: 34
End: 2024-03-28
Payer: COMMERCIAL

## 2024-03-28 DIAGNOSIS — E66.01 CLASS 3 SEVERE OBESITY DUE TO EXCESS CALORIES WITHOUT SERIOUS COMORBIDITY WITH BODY MASS INDEX (BMI) OF 40.0 TO 44.9 IN ADULT (H): Primary | ICD-10-CM

## 2024-03-28 DIAGNOSIS — E66.813 CLASS 3 SEVERE OBESITY DUE TO EXCESS CALORIES WITHOUT SERIOUS COMORBIDITY WITH BODY MASS INDEX (BMI) OF 40.0 TO 44.9 IN ADULT (H): Primary | ICD-10-CM

## 2024-03-28 NOTE — TELEPHONE ENCOUNTER
Patient wanting next dose of Wegovy sent to pharmacy. Was just on 0.5 mg dose of Wegovy.     Pended order for Wegovy 1 mg dose, please change if this is not the correct next dose.     Leora Villela RN on 3/28/2024 at 9:44 AM

## 2024-05-16 ENCOUNTER — MYC MEDICAL ADVICE (OUTPATIENT)
Dept: FAMILY MEDICINE | Facility: CLINIC | Age: 34
End: 2024-05-16
Payer: COMMERCIAL

## 2024-05-16 DIAGNOSIS — Z01.84 IMMUNITY STATUS TESTING: Primary | ICD-10-CM

## 2024-06-04 ENCOUNTER — MYC MEDICAL ADVICE (OUTPATIENT)
Dept: FAMILY MEDICINE | Facility: CLINIC | Age: 34
End: 2024-06-04
Payer: COMMERCIAL

## 2024-06-04 DIAGNOSIS — E66.01 CLASS 3 SEVERE OBESITY DUE TO EXCESS CALORIES WITHOUT SERIOUS COMORBIDITY WITH BODY MASS INDEX (BMI) OF 40.0 TO 44.9 IN ADULT (H): Primary | ICD-10-CM

## 2024-06-04 DIAGNOSIS — E66.813 CLASS 3 SEVERE OBESITY DUE TO EXCESS CALORIES WITHOUT SERIOUS COMORBIDITY WITH BODY MASS INDEX (BMI) OF 40.0 TO 44.9 IN ADULT (H): Primary | ICD-10-CM

## 2024-06-14 NOTE — TELEPHONE ENCOUNTER
I spoke with her and notified her that the TB test is in there also. No need to duplicate order.   Rhoda Redman MD

## 2024-06-14 NOTE — TELEPHONE ENCOUNTER
I spoke with her and notified her that I am approving the next dose increase. Will have to check with pharmacy whether a PA is needed or if they will require her to start over. I asked her to keep me updated. Will plan to stay at this dose for 3 months if it's working, may not need increase to next dose.   Also I notified her that her lab orders are already in. (TB test)  Rhoda Redman MD

## 2024-06-24 ENCOUNTER — LAB (OUTPATIENT)
Dept: LAB | Facility: CLINIC | Age: 34
End: 2024-06-24
Payer: COMMERCIAL

## 2024-06-24 ENCOUNTER — TELEPHONE (OUTPATIENT)
Dept: FAMILY MEDICINE | Facility: CLINIC | Age: 34
End: 2024-06-24

## 2024-06-24 DIAGNOSIS — Z01.84 IMMUNITY STATUS TESTING: ICD-10-CM

## 2024-06-24 LAB
HBV SURFACE AB SERPL IA-ACNC: >1000 M[IU]/ML
HBV SURFACE AB SERPL IA-ACNC: REACTIVE M[IU]/ML

## 2024-06-24 PROCEDURE — 86481 TB AG RESPONSE T-CELL SUSP: CPT

## 2024-06-24 PROCEDURE — 36415 COLL VENOUS BLD VENIPUNCTURE: CPT

## 2024-06-24 PROCEDURE — 86706 HEP B SURFACE ANTIBODY: CPT

## 2024-06-24 PROCEDURE — 86787 VARICELLA-ZOSTER ANTIBODY: CPT

## 2024-06-24 NOTE — TELEPHONE ENCOUNTER
Forms/Letter Request    Type of form/letter: OTHER:        Do we have the form/letter: Yes:     Who is the form from? Patient    Where did/will the form come from? Patient or family brought in       When is form/letter needed by: ASAP    How would you like the form/letter returned: Mail  Is this the correct address?: Yes  [address] [facility] [address]    Patient Notified form requests are processed in 5-7 business days:Yes    Could we send this information to you in [employer]idea or would you prefer to receive a phone call?:   No preference   Okay to leave a detailed message?: Yes at Home number on file [phone] (home)

## 2024-06-25 LAB
QUANTIFERON MITOGEN: 10 IU/ML
QUANTIFERON NIL TUBE: 0.02 IU/ML
QUANTIFERON TB1 TUBE: 0.05 IU/ML
QUANTIFERON TB2 TUBE: 0.04
VZV IGG SER QL IA: 1572 INDEX
VZV IGG SER QL IA: POSITIVE

## 2024-06-26 LAB
GAMMA INTERFERON BACKGROUND BLD IA-ACNC: 0.02 IU/ML
M TB IFN-G BLD-IMP: NEGATIVE
M TB IFN-G CD4+ BCKGRND COR BLD-ACNC: 9.98 IU/ML
MITOGEN IGNF BCKGRD COR BLD-ACNC: 0.02 IU/ML
MITOGEN IGNF BCKGRD COR BLD-ACNC: 0.03 IU/ML

## 2024-07-29 ENCOUNTER — OFFICE VISIT (OUTPATIENT)
Dept: ORTHOPEDICS | Facility: CLINIC | Age: 34
End: 2024-07-29
Payer: COMMERCIAL

## 2024-07-29 ENCOUNTER — ANCILLARY PROCEDURE (OUTPATIENT)
Dept: GENERAL RADIOLOGY | Facility: CLINIC | Age: 34
End: 2024-07-29
Attending: PHYSICIAN ASSISTANT
Payer: COMMERCIAL

## 2024-07-29 VITALS — DIASTOLIC BLOOD PRESSURE: 82 MMHG | WEIGHT: 222.7 LBS | SYSTOLIC BLOOD PRESSURE: 138 MMHG | BODY MASS INDEX: 34.75 KG/M2

## 2024-07-29 DIAGNOSIS — M25.511 RIGHT SHOULDER PAIN: ICD-10-CM

## 2024-07-29 DIAGNOSIS — M75.01 ADHESIVE CAPSULITIS OF RIGHT SHOULDER: Primary | ICD-10-CM

## 2024-07-29 PROCEDURE — 99203 OFFICE O/P NEW LOW 30 MIN: CPT | Mod: 25 | Performed by: PHYSICIAN ASSISTANT

## 2024-07-29 PROCEDURE — 73030 X-RAY EXAM OF SHOULDER: CPT | Mod: TC | Performed by: RADIOLOGY

## 2024-07-29 PROCEDURE — 20610 DRAIN/INJ JOINT/BURSA W/O US: CPT | Mod: RT | Performed by: PHYSICIAN ASSISTANT

## 2024-07-29 RX ORDER — TRIAMCINOLONE ACETONIDE 40 MG/ML
80 INJECTION, SUSPENSION INTRA-ARTICULAR; INTRAMUSCULAR
Status: SHIPPED | OUTPATIENT
Start: 2024-07-29

## 2024-07-29 RX ORDER — BUPIVACAINE HYDROCHLORIDE 5 MG/ML
3 INJECTION, SOLUTION PERINEURAL
Status: SHIPPED | OUTPATIENT
Start: 2024-07-29

## 2024-07-29 RX ADMIN — TRIAMCINOLONE ACETONIDE 80 MG: 40 INJECTION, SUSPENSION INTRA-ARTICULAR; INTRAMUSCULAR at 15:58

## 2024-07-29 RX ADMIN — BUPIVACAINE HYDROCHLORIDE 3 ML: 5 INJECTION, SOLUTION PERINEURAL at 15:58

## 2024-07-29 ASSESSMENT — PAIN SCALES - GENERAL: PAINLEVEL: NO PAIN (0)

## 2024-07-29 NOTE — PROGRESS NOTES
ORTHOPEDIC CONSULT      Chief Complaint: Marie Yeboah is a 34 year old right hand dominant female     She is being seen for   Chief Complaint   Patient presents with    Shoulder Pain     Right    Consult         History of Present Illness:   Mechanism of Injury: No injury fall or trauma that she knows of.  Just woke up 1 morning and her shoulder was stiff about 4 months ago  Location: Right shoulder  Duration of Pain: 4 months  Rating of Pain: Mild stiffness  Pain Quality: Stiffness  Pain is better with: Rest  Pain is worse with: Abduction at maximal range of motion  Treatment so far consists of: Patient has tried Tylenol which has not helped that much and also 600 mg of ibuprofen every once in a while which has not helped much.  Patient has not done any topicals or any formal therapy or any injections.  Associated Features: Denies any shooting burning or electric pain.  Does feel that it is getting slightly better.  Patient notices pain usually in the morning.  Patient does not have any difficulty reaching the top shelf but does have some pain with holding her arm away from her body.  Patient has been able to lift okay as she is moving currently.    Prior history of related problems: No previous surgery or trauma or fractures of the right shoulder  Pain is Limiting: Full range of motion  Here to: Orthopedic consultation  The Pain Has: Gotten slightly better  Additional History: Patient is not diabetic      Patient's past medical, surgical, social and family histories reviewed.     Past Medical History:   Diagnosis Date    Anxiety     ASCUS with positive high risk HPV cervical 12/08/2008 12/8/2008 (age 18) per Care Everywhere. 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         Past Surgical History:   Procedure Laterality Date    C/SECTION, LOW TRANSVERSE      , 2016     SECTION, TUBAL LIGATION, COMBINED Bilateral 10/25/2021    Procedure:  SECTION  with bilateral salpingectomy;  Surgeon: Rajinder Leblanc MD;  Location: [facility]    COMBINED  SECTION, SALPINGECTOMY BILATERAL Bilateral 10/25/2021    HC REMOVAL OF TONSILS,<13 Y/O         Medications:  Current Outpatient Medications   Medication Sig Dispense Refill    butalbital-acetaminophen-caffeine (ESGIC) -40 MG tablet Take 1 tablet by mouth every 4 hours as needed for headaches 20 tablet 0    EPINEPHrine (AUVI-Q) 0.3 MG/0.3ML injection 2-pack Inject 0.3 mLs (0.3 mg) into the muscle as needed for anaphylaxis 2 each 1    losartan (COZAAR) 50 MG tablet Take 1 tablet (50 mg) by mouth daily 90 tablet 3    Semaglutide-Weight Management (WEGOVY) 1.7 MG/0.75ML pen Inject 1.7 mg Subcutaneous once a week 3 mL 3    sertraline (ZOLOFT) 50 MG tablet Take 1 tablet (50 mg) by mouth daily 90 tablet 3    scopolamine (TRANSDERM) 1 MG/3DAYS 72 hr patch Place 1 patch onto the skin every 72 hours (Patient not taking: Reported on 2024) 4 patch 2     No current facility-administered medications for this visit.       Allergies   Allergen Reactions    Blood Transfusion Related (Informational Only) Other (See Comments)     Patient has a history of a clinically significant antibody against RBC antigens.  A delay in compatible RBCs may occur.     Contrast Dye Anaphylaxis    Bee Venom      Other reaction(s): Hives  Other reaction(s): Hives       Social History     Occupational History    Not on file   Tobacco Use    Smoking status: Former     Types: Cigarettes     Start date: 2007    Smokeless tobacco: Never    Tobacco comments:     Very little use now, maybe once a month   Vaping Use    Vaping status: Never Used   Substance and Sexual Activity    Alcohol use: Yes     Types: 1 Glasses of wine, 1 Cans of beer per  week     Comment: 2 drinks a week    Drug use: No    Sexual activity: Yes     Partners: Male     Birth control/protection: Female Surgical       Family History   Problem Relation Age of Onset    Hypertension Mother     Seizure Disorder Mother     Unknown/Adopted Mother     Hypertension Father     Heart Disease Father     Hyperlipidemia Father     Anxiety Disorder Father     No Known Problems Sister     No Known Problems Sister     Diabetes Paternal Grandmother     Brain Tumor Paternal Grandfather     Prostate Cancer Paternal Grandfather     No Known Problems Son     No Known Problems Son        REVIEW OF SYSTEMS  10 point review systems performed otherwise negative as noted as per history of present illness.    Physical Exam:  Vitals: /82 (BP Location: Right arm, Patient Position: Sitting, Cuff Size: Adult Large)   Wt 101 kg (222 lb 11.2 oz)   BMI 34.75 kg/m    BMI= Body mass index is 34.75 kg/m .    Constitutional: healthy, alert and no acute distress   Psychiatric: mentation appears normal and affect normal/bright  NEURO: no focal deficits, CMS intact right upper extremity  RESP: Normal with easy respirations and no use of accessory muscles to breathe, no audible wheezing or retractions  CV: +2 radial pulse and her hand is warm to palpation.   SKIN: No erythema, rashes, excoriation, or breakdown. No evidence of infection.   MUSCULOSKELETAL:  INSPECTION of right shoulder: No gross deformities, erythema, edema, ecchymosis, atrophy or fasciculations.   PALPATION: No tenderness AC joint, proximal biceps tendon, clavicle, lateral shoulder, posterior shoulder, trapezius area. No increased warmth noted.   ROM: Passive: Forward flexion to 180 , abduction to 150 degrees compared to the contralateral side of 180 , external rotation to 70 and symmetrical, internal rotation to lumbar spine.  All range of motion is without catching, locking but there is pain especially with abduction at maximal range of motion..     STRENGTH: 5 out of 5 , deltoid as well as internal and external rotation. 5 out of 5 supraspinatus, infraspinatus and subscapularis.    SPECIAL TEST: Patient has a negative cross over test, negative bear hug test and negative liftoff test, negative empty can and full can test, negative external rotator lag sign, negative drop test, negative crank test, negative apprehension test  GAIT: non-antalgic  Lymph: no palpable lymph nodes    Diagnostic Modalities:  X-rays done today showing No fracture no dislocation no tumor and no high riding humerus no downsloping acromion and adequate glenohumeral joint spacing as well as AC joint spacing.  Alignment appropriate.    Independent visualization of the images was performed.    Impression: 1.  Right shoulder adhesive capsulitis    Plan:  All of the above pertinent physical exam and imaging modalities findings was reviewed with Marie.    Large Joint Injection/Arthocentesis: R subacromial bursa    Date/Time: 7/29/2024 3:58 PM    Performed by: Jose Christie PA-C  Authorized by: Jose Christie PA-C    Indications:  Pain  Needle Size:  22 G  Guidance: landmark guided    Approach:  Posterolateral  Location:  Shoulder      Site:  R subacromial bursa  Medications:  80 mg triamcinolone 40 MG/ML; 3 mL BUPivacaine 0.5 %  Aspirate amount (mL):  0  Procedure discussed: discussed risks, benefits, and alternatives    Consent Given by:  Patient  Timeout: timeout called immediately prior to procedure    Prep: patient was prepped and draped in usual sterile fashion     The skin was prepped with betadine. The patient was in a seated position. I used jeremiah chloride spray prior to doing the injection.  The patient tolerated the injection well, and there were no complications. The injection site was covered with a Band-Aid.        FOCUSED PLAN:  Patient with no injury fall or trauma but increased stiffness noted mostly in abduction.  No pain radiation or weakness with rotator cuff testing  we decided to proceed with a steroid injection formal physical therapy to focus on stretching and range of motion to help expedite recovery of adhesive capsulitis.  Follow-up as needed    Re-x-ray on return: No      This note was dictated with [employer].    Jose Christie PA-C

## 2024-07-29 NOTE — LETTER
7/29/2024      Marie Yeboah  [address] [address]      Dear Colleague,    Thank you for referring your patient, Marie Yeboah, to the [facility]. Please see a copy of my visit note below.    ORTHOPEDIC CONSULT      Chief Complaint: Marie Yeboah is a 34 year old right hand dominant female     She is being seen for   Chief Complaint   Patient presents with     Shoulder Pain     Right     Consult         History of Present Illness:   Mechanism of Injury: No injury fall or trauma that she knows of.  Just woke up 1 morning and her shoulder was stiff about 4 months ago  Location: Right shoulder  Duration of Pain: 4 months  Rating of Pain: Mild stiffness  Pain Quality: Stiffness  Pain is better with: Rest  Pain is worse with: Abduction at maximal range of motion  Treatment so far consists of: Patient has tried Tylenol which has not helped that much and also 600 mg of ibuprofen every once in a while which has not helped much.  Patient has not done any topicals or any formal therapy or any injections.  Associated Features: Denies any shooting burning or electric pain.  Does feel that it is getting slightly better.  Patient notices pain usually in the morning.  Patient does not have any difficulty reaching the top shelf but does have some pain with holding her arm away from her body.  Patient has been able to lift okay as she is moving currently.    Prior history of related problems: No previous surgery or trauma or fractures of the right shoulder  Pain is Limiting: Full range of motion  Here to: Orthopedic consultation  The Pain Has: Gotten slightly better  Additional History: Patient is not diabetic      Patient's past medical, surgical, social and family histories reviewed.     Past Medical History:   Diagnosis Date     Anxiety      ASCUS with positive high risk HPV cervical 12/08/2008 12/8/2008 (age 18) per Care Everywhere. 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        Past Surgical History:   Procedure Laterality Date     C/SECTION, LOW TRANSVERSE      ,       SECTION, TUBAL LIGATION, COMBINED Bilateral 10/25/2021    Procedure:  SECTION  with bilateral salpingectomy;  Surgeon: Rajinder Leblanc MD;  Location: [facility]     COMBINED  SECTION, SALPINGECTOMY BILATERAL Bilateral 10/25/2021     HC REMOVAL OF TONSILS,<12 Y/O         Medications:  Current Outpatient Medications   Medication Sig Dispense Refill     butalbital-acetaminophen-caffeine (ESGIC) -40 MG tablet Take 1 tablet by mouth every 4 hours as needed for headaches 20 tablet 0     EPINEPHrine (AUVI-Q) 0.3 MG/0.3ML injection 2-pack Inject 0.3 mLs (0.3 mg) into the muscle as needed for anaphylaxis 2 each 1     losartan (COZAAR) 50 MG tablet Take 1 tablet (50 mg) by mouth daily 90 tablet 3     Semaglutide-Weight Management (WEGOVY) 1.7 MG/0.75ML pen Inject 1.7 mg Subcutaneous once a week 3 mL 3     sertraline (ZOLOFT) 50 MG tablet Take 1 tablet (50 mg) by mouth daily 90 tablet 3     scopolamine (TRANSDERM) 1 MG/3DAYS 72 hr patch Place 1 patch onto the skin every 72 hours (Patient not taking: Reported on 2024) 4 patch 2     No current facility-administered medications for this visit.       Allergies   Allergen Reactions     Blood Transfusion Related (Informational Only) Other (See Comments)     Patient has a history of a clinically significant antibody against RBC antigens.  A delay in compatible RBCs may occur.      Contrast Dye Anaphylaxis     Bee Venom      Other reaction(s): Hives  Other reaction(s): Hives       Social History     Occupational History     Not on file   Tobacco Use     Smoking status: Former     Types: Cigarettes     Start  date: 1/1/2007     Smokeless tobacco: Never     Tobacco comments:     Very little use now, maybe once a month   Vaping Use     Vaping status: Never Used   Substance and Sexual Activity     Alcohol use: Yes     Types: 1 Glasses of wine, 1 Cans of beer per week     Comment: 2 drinks a week     Drug use: No     Sexual activity: Yes     Partners: Male     Birth control/protection: Female Surgical       Family History   Problem Relation Age of Onset     Hypertension Mother      Seizure Disorder Mother      Unknown/Adopted Mother      Hypertension Father      Heart Disease Father      Hyperlipidemia Father      Anxiety Disorder Father      No Known Problems Sister      No Known Problems Sister      Diabetes Paternal Grandmother      Brain Tumor Paternal Grandfather      Prostate Cancer Paternal Grandfather      No Known Problems Son      No Known Problems Son        REVIEW OF SYSTEMS  10 point review systems performed otherwise negative as noted as per history of present illness.    Physical Exam:  Vitals: /82 (BP Location: Right arm, Patient Position: Sitting, Cuff Size: Adult Large)   Wt 101 kg (222 lb 11.2 oz)   BMI 34.75 kg/m    BMI= Body mass index is 34.75 kg/m .    Constitutional: healthy, alert and no acute distress   Psychiatric: mentation appears normal and affect normal/bright  NEURO: no focal deficits, CMS intact right upper extremity  RESP: Normal with easy respirations and no use of accessory muscles to breathe, no audible wheezing or retractions  CV: +2 radial pulse and her hand is warm to palpation.   SKIN: No erythema, rashes, excoriation, or breakdown. No evidence of infection.   MUSCULOSKELETAL:  INSPECTION of right shoulder: No gross deformities, erythema, edema, ecchymosis, atrophy or fasciculations.   PALPATION: No tenderness AC joint, proximal biceps tendon, clavicle, lateral shoulder, posterior shoulder, trapezius area. No increased warmth noted.   ROM: Passive: Forward flexion to 180 ,  abduction to 150 degrees compared to the contralateral side of 180 , external rotation to 70 and symmetrical, internal rotation to lumbar spine.  All range of motion is without catching, locking but there is pain especially with abduction at maximal range of motion..    STRENGTH: 5 out of 5 , deltoid as well as internal and external rotation. 5 out of 5 supraspinatus, infraspinatus and subscapularis.    SPECIAL TEST: Patient has a negative cross over test, negative bear hug test and negative liftoff test, negative empty can and full can test, negative external rotator lag sign, negative drop test, negative crank test, negative apprehension test  GAIT: non-antalgic  Lymph: no palpable lymph nodes    Diagnostic Modalities:  X-rays done today showing No fracture no dislocation no tumor and no high riding humerus no downsloping acromion and adequate glenohumeral joint spacing as well as AC joint spacing.  Alignment appropriate.    Independent visualization of the images was performed.    Impression: 1.  Right shoulder adhesive capsulitis    Plan:  All of the above pertinent physical exam and imaging modalities findings was reviewed with Marie.    Large Joint Injection/Arthocentesis: R subacromial bursa    Date/Time: 7/29/2024 3:58 PM    Performed by: Jose Christie PA-C  Authorized by: Jose Christie PA-C    Indications:  Pain  Needle Size:  22 G  Guidance: landmark guided    Approach:  Posterolateral  Location:  Shoulder      Site:  R subacromial bursa  Medications:  80 mg triamcinolone 40 MG/ML; 3 mL BUPivacaine 0.5 %  Aspirate amount (mL):  0  Procedure discussed: discussed risks, benefits, and alternatives    Consent Given by:  Patient  Timeout: timeout called immediately prior to procedure    Prep: patient was prepped and draped in usual sterile fashion     The skin was prepped with betadine. The patient was in a seated position. I used jeremiah chloride spray prior to doing the injection.  The patient tolerated  the injection well, and there were no complications. The injection site was covered with a Band-Aid.        FOCUSED PLAN:  Patient with no injury fall or trauma but increased stiffness noted mostly in abduction.  No pain radiation or weakness with rotator cuff testing we decided to proceed with a steroid injection formal physical therapy to focus on stretching and range of motion to help expedite recovery of adhesive capsulitis.  Follow-up as needed    Re-x-ray on return: No      This note was dictated with [employer] [facility].    Jose Christie PA-C        Again, thank you for allowing me to participate in the care of your patient.        Sincerely,        Jose Christie PA-C

## 2024-09-29 ENCOUNTER — MYC REFILL (OUTPATIENT)
Dept: FAMILY MEDICINE | Facility: CLINIC | Age: 34
End: 2024-09-29
Payer: COMMERCIAL

## 2024-09-29 DIAGNOSIS — E66.01 CLASS 3 SEVERE OBESITY DUE TO EXCESS CALORIES WITHOUT SERIOUS COMORBIDITY WITH BODY MASS INDEX (BMI) OF 40.0 TO 44.9 IN ADULT (H): ICD-10-CM

## 2024-09-29 DIAGNOSIS — E66.813 CLASS 3 SEVERE OBESITY DUE TO EXCESS CALORIES WITHOUT SERIOUS COMORBIDITY WITH BODY MASS INDEX (BMI) OF 40.0 TO 44.9 IN ADULT (H): ICD-10-CM

## 2024-09-30 ENCOUNTER — MYC MEDICAL ADVICE (OUTPATIENT)
Dept: FAMILY MEDICINE | Facility: CLINIC | Age: 34
End: 2024-09-30
Payer: COMMERCIAL

## 2024-09-30 DIAGNOSIS — E66.01 CLASS 3 SEVERE OBESITY DUE TO EXCESS CALORIES WITHOUT SERIOUS COMORBIDITY WITH BODY MASS INDEX (BMI) OF 40.0 TO 44.9 IN ADULT (H): ICD-10-CM

## 2024-09-30 DIAGNOSIS — E66.813 CLASS 3 SEVERE OBESITY DUE TO EXCESS CALORIES WITHOUT SERIOUS COMORBIDITY WITH BODY MASS INDEX (BMI) OF 40.0 TO 44.9 IN ADULT (H): ICD-10-CM

## 2024-09-30 NOTE — TELEPHONE ENCOUNTER
Contacted [facility] and they need a new prescription for this medication. They do not have any refills on file. Refill request sent to PCP.    Teena Platt RN on 9/30/2024 at 12:13 PM

## 2024-10-12 SDOH — HEALTH STABILITY: PHYSICAL HEALTH: ON AVERAGE, HOW MANY MINUTES DO YOU ENGAGE IN EXERCISE AT THIS LEVEL?: 30 MIN

## 2024-10-12 SDOH — HEALTH STABILITY: PHYSICAL HEALTH: ON AVERAGE, HOW MANY DAYS PER WEEK DO YOU ENGAGE IN MODERATE TO STRENUOUS EXERCISE (LIKE A BRISK WALK)?: 1 DAY

## 2024-10-12 ASSESSMENT — SOCIAL DETERMINANTS OF HEALTH (SDOH): HOW OFTEN DO YOU GET TOGETHER WITH FRIENDS OR RELATIVES?: ONCE A WEEK

## 2024-10-15 ENCOUNTER — OFFICE VISIT (OUTPATIENT)
Dept: FAMILY MEDICINE | Facility: CLINIC | Age: 34
End: 2024-10-15
Attending: FAMILY MEDICINE
Payer: COMMERCIAL

## 2024-10-15 VITALS
HEIGHT: 68 IN | DIASTOLIC BLOOD PRESSURE: 80 MMHG | TEMPERATURE: 97.7 F | HEART RATE: 86 BPM | BODY MASS INDEX: 31.67 KG/M2 | WEIGHT: 209 LBS | SYSTOLIC BLOOD PRESSURE: 128 MMHG | OXYGEN SATURATION: 99 % | RESPIRATION RATE: 16 BRPM

## 2024-10-15 DIAGNOSIS — Z23 NEED FOR IMMUNIZATION AGAINST INFLUENZA: ICD-10-CM

## 2024-10-15 DIAGNOSIS — Z00.00 ROUTINE GENERAL MEDICAL EXAMINATION AT A HEALTH CARE FACILITY: Primary | ICD-10-CM

## 2024-10-15 DIAGNOSIS — R51.9 NONINTRACTABLE EPISODIC HEADACHE, UNSPECIFIED HEADACHE TYPE: ICD-10-CM

## 2024-10-15 DIAGNOSIS — F41.9 ANXIETY: ICD-10-CM

## 2024-10-15 DIAGNOSIS — E66.09 CLASS 1 OBESITY DUE TO EXCESS CALORIES WITHOUT SERIOUS COMORBIDITY WITH BODY MASS INDEX (BMI) OF 32.0 TO 32.9 IN ADULT: ICD-10-CM

## 2024-10-15 DIAGNOSIS — F33.1 MODERATE EPISODE OF RECURRENT MAJOR DEPRESSIVE DISORDER (H): ICD-10-CM

## 2024-10-15 DIAGNOSIS — Z23 NEED FOR VACCINATION AGAINST HEPATITIS A: ICD-10-CM

## 2024-10-15 DIAGNOSIS — I10 HYPERTENSION, GOAL BELOW 140/90: ICD-10-CM

## 2024-10-15 DIAGNOSIS — E66.811 CLASS 1 OBESITY DUE TO EXCESS CALORIES WITHOUT SERIOUS COMORBIDITY WITH BODY MASS INDEX (BMI) OF 32.0 TO 32.9 IN ADULT: ICD-10-CM

## 2024-10-15 LAB
ALBUMIN SERPL BCG-MCNC: 4.3 G/DL (ref 3.5–5.2)
ALP SERPL-CCNC: 45 U/L (ref 40–150)
ALT SERPL W P-5'-P-CCNC: 10 U/L (ref 0–50)
ANION GAP SERPL CALCULATED.3IONS-SCNC: 13 MMOL/L (ref 7–15)
AST SERPL W P-5'-P-CCNC: 14 U/L (ref 0–45)
BILIRUB SERPL-MCNC: 0.3 MG/DL
BUN SERPL-MCNC: 14.8 MG/DL (ref 6–20)
CALCIUM SERPL-MCNC: 9.5 MG/DL (ref 8.8–10.4)
CHLORIDE SERPL-SCNC: 99 MMOL/L (ref 98–107)
CREAT SERPL-MCNC: 0.72 MG/DL (ref 0.51–0.95)
EGFRCR SERPLBLD CKD-EPI 2021: >90 ML/MIN/1.73M2
GLUCOSE SERPL-MCNC: 84 MG/DL (ref 70–99)
HCO3 SERPL-SCNC: 24 MMOL/L (ref 22–29)
HOLD SPECIMEN: NORMAL
POTASSIUM SERPL-SCNC: 3.8 MMOL/L (ref 3.4–5.3)
PROT SERPL-MCNC: 7.1 G/DL (ref 6.4–8.3)
SODIUM SERPL-SCNC: 136 MMOL/L (ref 135–145)

## 2024-10-15 PROCEDURE — 36415 COLL VENOUS BLD VENIPUNCTURE: CPT | Performed by: FAMILY MEDICINE

## 2024-10-15 PROCEDURE — 90632 HEPA VACCINE ADULT IM: CPT | Performed by: FAMILY MEDICINE

## 2024-10-15 PROCEDURE — 99395 PREV VISIT EST AGE 18-39: CPT | Mod: 25 | Performed by: FAMILY MEDICINE

## 2024-10-15 PROCEDURE — 90472 IMMUNIZATION ADMIN EACH ADD: CPT | Performed by: FAMILY MEDICINE

## 2024-10-15 PROCEDURE — 80053 COMPREHEN METABOLIC PANEL: CPT | Performed by: FAMILY MEDICINE

## 2024-10-15 PROCEDURE — 90471 IMMUNIZATION ADMIN: CPT | Performed by: FAMILY MEDICINE

## 2024-10-15 PROCEDURE — 99214 OFFICE O/P EST MOD 30 MIN: CPT | Mod: 25 | Performed by: FAMILY MEDICINE

## 2024-10-15 PROCEDURE — 90656 IIV3 VACC NO PRSV 0.5 ML IM: CPT | Performed by: FAMILY MEDICINE

## 2024-10-15 RX ORDER — LOSARTAN POTASSIUM 50 MG/1
50 TABLET ORAL DAILY
Qty: 90 TABLET | Refills: 3 | Status: SHIPPED | OUTPATIENT
Start: 2024-10-15

## 2024-10-15 RX ORDER — BUTALBITAL, ACETAMINOPHEN AND CAFFEINE 50; 325; 40 MG/1; MG/1; MG/1
1 TABLET ORAL EVERY 4 HOURS PRN
Qty: 20 TABLET | Refills: 0 | Status: SHIPPED | OUTPATIENT
Start: 2024-10-15

## 2024-10-15 ASSESSMENT — PATIENT HEALTH QUESTIONNAIRE - PHQ9
SUM OF ALL RESPONSES TO PHQ QUESTIONS 1-9: 6
10. IF YOU CHECKED OFF ANY PROBLEMS, HOW DIFFICULT HAVE THESE PROBLEMS MADE IT FOR YOU TO DO YOUR WORK, TAKE CARE OF THINGS AT HOME, OR GET ALONG WITH OTHER PEOPLE: SOMEWHAT DIFFICULT
SUM OF ALL RESPONSES TO PHQ QUESTIONS 1-9: 6

## 2024-10-15 ASSESSMENT — PAIN SCALES - GENERAL: PAINLEVEL: NO PAIN (0)

## 2024-10-15 NOTE — PATIENT INSTRUCTIONS
Patient Education   Preventive Care Advice   This is general advice given by our system to help you stay healthy. However, your care team may have specific advice just for you. Please talk to your care team about your preventive care needs.  Nutrition  Eat 5 or more servings of fruits and vegetables each day.  Try wheat bread, brown rice and whole grain pasta (instead of white bread, rice, and pasta).  Get enough calcium and vitamin D. Check the label on foods and aim for 100% of the RDA (recommended daily allowance).  Lifestyle  Exercise at least 150 minutes each week  (30 minutes a day, 5 days a week).  Do muscle strengthening activities 2 days a week. These help control your weight and prevent disease.  No smoking.  Wear sunscreen to prevent skin cancer.  Have a dental exam and cleaning every 6 months.  Yearly exams  See your health care team every year to talk about:  Any changes in your health.  Any medicines your care team has prescribed.  Preventive care, family planning, and ways to prevent chronic diseases.  Shots (vaccines)   HPV shots (up to age 26), if you've never had them before.  Hepatitis B shots (up to age 59), if you've never had them before.  COVID-19 shot: Get this shot when it's due.  Flu shot: Get a flu shot every year.  Tetanus shot: Get a tetanus shot every 10 years.  Pneumococcal, hepatitis A, and RSV shots: Ask your care team if you need these based on your risk.  Shingles shot (for age 50 and up)  General health tests  Diabetes screening:  Starting at age 35, Get screened for diabetes at least every 3 years.  If you are younger than age 35, ask your care team if you should be screened for diabetes.  Cholesterol test: At age 39, start having a cholesterol test every 5 years, or more often if advised.  Bone density scan (DEXA): At age 50, ask your care team if you should have this scan for osteoporosis (brittle bones).  Hepatitis C: Get tested at least once in your life.  STIs (sexually  transmitted infections)  Before age 24: Ask your care team if you should be screened for STIs.  After age 24: Get screened for STIs if you're at risk. You are at risk for STIs (including HIV) if:  You are sexually active with more than one person.  You don't use condoms every time.  You or a partner was diagnosed with a sexually transmitted infection.  If you are at risk for HIV, ask about PrEP medicine to prevent HIV.  Get tested for HIV at least once in your life, whether you are at risk for HIV or not.  Cancer screening tests  Cervical cancer screening: If you have a cervix, begin getting regular cervical cancer screening tests starting at age 21.  Breast cancer scan (mammogram): If you've ever had breasts, begin having regular mammograms starting at age 40. This is a scan to check for breast cancer.  Colon cancer screening: It is important to start screening for colon cancer at age 45.  Have a colonoscopy test every 10 years (or more often if you're at risk) Or, ask your provider about stool tests like a FIT test every year or Cologuard test every 3 years.  To learn more about your testing options, visit:   .  For help making a decision, visit:   https://bit.ly/xy45501.  Prostate cancer screening test: If you have a prostate, ask your care team if a prostate cancer screening test (PSA) at age 55 is right for you.  Lung cancer screening: If you are a current or former smoker ages 50 to 80, ask your care team if ongoing lung cancer screenings are right for you.  For informational purposes only. Not to replace the advice of your health care provider. Copyright   2023 [facility]. All rights reserved. Clinically reviewed by the [facility] Transitions Program. [employer] 354805 - REV 01/24.  Learning About Stress  What is stress?     Stress is your body's response to a hard situation. Your body can have a physical, emotional, or mental response. Stress is a fact of life for most people, and it  affects everyone differently. What causes stress for you may not be stressful for someone else.  A lot of things can cause stress. You may feel stress when you go on a job interview, take a test, or run a race. This kind of short-term stress is normal and even useful. It can help you if you need to work hard or react quickly. For example, stress can help you finish an important job on time.  Long-term stress is caused by ongoing stressful situations or events. Examples of long-term stress include long-term health problems, ongoing problems at work, or conflicts in your family. Long-term stress can harm your health.  How does stress affect your health?  When you are stressed, your body responds as though you are in danger. It makes hormones that speed up your heart, make you breathe faster, and give you a burst of energy. This is called the fight-or-flight stress response. If the stress is over quickly, your body goes back to normal and no harm is done.  But if stress happens too often or lasts too long, it can have bad effects. Long-term stress can make you more likely to get sick, and it can make symptoms of some diseases worse. If you tense up when you are stressed, you may develop neck, shoulder, or low back pain. Stress is linked to high blood pressure and heart disease.  Stress also harms your emotional health. It can make you nova, tense, or depressed. Your relationships may suffer, and you may not do well at work or school.  What can you do to manage stress?  You can try these things to help manage stress:   Do something active. Exercise or activity can help reduce stress. Walking is a great way to get started. Even everyday activities such as housecleaning or yard work can help.  Try yoga or sarah chi. These techniques combine exercise and meditation. You may need some training at first to learn them.  Do something you enjoy. For example, listen to music or go to a movie. Practice your hobby or do volunteer  "work.  Meditate. This can help you relax, because you are not worrying about what happened before or what may happen in the future.  Do guided imagery. Imagine yourself in any setting that helps you feel calm. You can use online videos, books, or a teacher to guide you.  Do breathing exercises. For example:  From a standing position, bend forward from the waist with your knees slightly bent. Let your arms dangle close to the floor.  Breathe in slowly and deeply as you return to a standing position. Roll up slowly and lift your head last.  Hold your breath for just a few seconds in the standing position.  Breathe out slowly and bend forward from the waist.  Let your feelings out. Talk, laugh, cry, and express anger when you need to. Talking with supportive friends or family, a counselor, or a gerber leader about your feelings is a healthy way to relieve stress. Avoid discussing your feelings with people who make you feel worse.  Write. It may help to write about things that are bothering you. This helps you find out how much stress you feel and what is causing it. When you know this, you can find better ways to cope.  What can you do to prevent stress?  You might try some of these things to help prevent stress:  Manage your time. This helps you find time to do the things you want and need to do.  Get enough sleep. Your body recovers from the stresses of the day while you are sleeping.  Get support. Your family, friends, and community can make a difference in how you experience stress.  Limit your news feed. Avoid or limit time on social media or news that may make you feel stressed.  Do something active. Exercise or activity can help reduce stress. Walking is a great way to get started.  Where can you learn more?  Go to https://www.[employer].net/patiented  Enter N032 in the search box to learn more about \"Learning About Stress.\"  Current as of: October 24, 2023  Content Version: 14.2 2024 [employer]. "   Care instructions adapted under license by your healthcare professional. If you have questions about a medical condition or this instruction, always ask your healthcare professional. Healthwise, [facility] disclaims any warranty or liability for your use of this information.    Learning About Depression Screening  What is depression screening?  Depression screening is a way to see if you have depression symptoms. It may be done by a doctor or counselor. It's often part of a routine checkup. That's because your mental health is just as important as your physical health.  Depression is a mental health condition that affects how you feel, think, and act. You may:  Have less energy.  Lose interest in your daily activities.  Feel sad and grouchy for a long time.  Depression is very common. It affects people of all ages.  Many things can lead to depression. Some people become depressed after they have a stroke or find out they have a major illness like cancer or heart disease. The death of a loved one or a breakup may lead to depression. It can run in families. Most experts believe that a combination of inherited genes and stressful life events can cause it.  What happens during screening?  You may be asked to fill out a form about your depression symptoms. You and the doctor will discuss your answers. The doctor may ask you more questions to learn more about how you think, act, and feel.  What happens after screening?  If you have symptoms of depression, your doctor will talk to you about your options.  Doctors usually treat depression with medicines or counseling. Often, combining the two works best. Many people don't get help because they think that they'll get over the depression on their own. But people with depression may not get better unless they get treatment.  The cause of depression is not well understood. There may be many factors involved. But if you have depression, it's not your fault.  A serious  "symptom of depression is thinking about death or suicide. If you or someone you care about talks about this or about feeling hopeless, get help right away.  It's important to know that depression can be treated. Medicine, counseling, and self-care may help.  Where can you learn more?  Go to https://www.[employer].net/patiented  Enter T185 in the search box to learn more about \"Learning About Depression Screening.\"  Current as of: June 24, 2023  Content Version: 14.2 2024 [facility].   Care instructions adapted under license by your healthcare professional. If you have questions about a medical condition or this instruction, always ask your healthcare professional. [employer] disclaims any warranty or liability for your use of this information.       "

## 2024-10-15 NOTE — PROGRESS NOTES
Prior to immunization administration, verified patients identity using patient s name and date of birth. Please see Immunization Activity for additional information.     Screening Questionnaire for Adult Immunization    Are you sick today?   No   Do you have allergies to medications, food, a vaccine component or latex?   No   Have you ever had a serious reaction after receiving a vaccination?   No   Do you have a long-term health problem with heart, lung, kidney, or metabolic disease (e.g., diabetes), asthma, a blood disorder, no spleen, complement component deficiency, a cochlear implant, or a spinal fluid leak?  Are you on long-term aspirin therapy?   No   Do you have cancer, leukemia, HIV/AIDS, or any other immune system problem?   No   Do you have a parent, brother, or sister with an immune system problem?   No   In the past 3 months, have you taken medications that affect  your immune system, such as prednisone, other steroids, or anticancer drugs; drugs for the treatment of rheumatoid arthritis, Crohn s disease, or psoriasis; or have you had radiation treatments?   No   Have you had a seizure, or a brain or other nervous system problem?   No   During the past year, have you received a transfusion of blood or blood    products, or been given immune (gamma) globulin or antiviral drug?   No   For women: Are you pregnant or is there a chance you could become       pregnant during the next month?   No   Have you received any vaccinations in the past 4 weeks?   No     Immunization questionnaire answers were all negative.      Patient instructed to remain in clinic for 15 minutes afterwards, and to report any adverse reactions.     Screening performed by Kiley Joshi MA on 10/15/2024 at 4:33 PM.

## 2024-10-15 NOTE — PROGRESS NOTES
Preventive Care Visit  [facility]  Rhoda Redman MD, Family Medicine  Oct 15, 2024      Assessment & Plan       ICD-10-CM    1. Routine general medical examination at a health care facility  Z00.00 PRIMARY CARE FOLLOW-UP SCHEDULING     REVIEW OF HEALTH MAINTENANCE PROTOCOL ORDERS     PRIMARY CARE FOLLOW-UP SCHEDULING      2. Hypertension, goal below 140/90  I10 losartan (COZAAR) 50 MG tablet     Extra Tube     Comprehensive metabolic panel (BMP + Alb, Alk Phos, ALT, AST, Total. Bili, TP)     CANCELED: BASIC METABOLIC PANEL      3. Class 1 obesity due to excess calories without serious comorbidity with body mass index (BMI) of 32.0 to 32.9 in adult  E66.811 Semaglutide-Weight Management (WEGOVY) 2.4 MG/0.75ML pen    E66.09     Z68.32       4. Nonintractable episodic headache, unspecified headache type  R51.9 butalbital-acetaminophen-caffeine (ESGIC) -40 MG tablet      5. Anxiety  F41.9 sertraline (ZOLOFT) 50 MG tablet      6. Need for immunization against influenza  Z23 INFLUENZA VACCINE,SPLIT VIRUS,TRIVALENT,PF(FLUZONE)      7. Need for vaccination against hepatitis A  Z23 HEPATITIS A 19+ (HAVRIX/VAQTA)      8. Moderate episode of recurrent major depressive disorder (H)  F33.1 sertraline (ZOLOFT) 50 MG tablet           Patient has been advised of split billing requirements and indicates understanding: Yes  I recommend a yearly physical, monthly SBE, pap smear every 3 years, not due today.     I refilled her butalbital, she uses infrequently.   I refilled her sertraline at current dose. We discussed the option of increasing the dose in the future if needed. At this time she feels stable, but if needed will increase to 50 mg and notify clinic for follow up.     She is doing well with her weight loss through semaglutide and weight watchers. I am refilling her medication for a year. However, depending on her progress as well as her insurance coverage, she may end up  "going off it sooner. Also, if she develops any worse side effects, she may drop down to a lower dose for maintenance. Discussed need to continue on healthy diet and work on getting more regular exercise built in as part of her routine for ongoing success after stopping medication.     Also her blood pressure may improve with further weight loss. At this time will continue on her losartan at same dose. Refilled. Would like her to check BP once a week or once a month minimum. If she develops hypotensive symptoms or starts to see readings below 110/60 might want to decrease her dose, would advise her to follow up at that time.     Labs reviewed as below, all normal. Lipids up to date.       Flu shot given. Declines covid.   Hep a completed.     BMI  Estimated body mass index is 32.16 kg/m  as calculated from the following:    Height as of this encounter: 1.717 m (5' 7.6\").    Weight as of this encounter: 94.8 kg (209 lb).   Weight management plan: as above    Counseling  Appropriate preventive services were addressed with this patient via screening, questionnaire, or discussion as appropriate for fall prevention, nutrition, physical activity, Tobacco-use cessation, social engagement, weight loss and cognition.  Checklist reviewing preventive services available has been given to the patient.  Reviewed patient's diet, addressing concerns and/or questions.   She is at risk for lack of exercise and has been provided with information to increase physical activity for the benefit of her well-being.   The patient was instructed to see the dentist every 6 months.   The patient's PHQ-9 score is consistent with mild depression. She was provided with information regarding depression.       Rhoda Redman MD     Devan Salazar is a 34 year old, presenting for the following:  Physical        10/15/2024     3:43 PM   Additional Questions   Roomed by Carol SIMENTAL        [facility] Directive  Patient does not have a Health " Care Directive or Living Will: Discussed advance care planning with patient; however, patient declined at this time.    HPI        10/12/2024   General Health   How would you rate your overall physical health? Good   Feel stress (tense, anxious, or unable to sleep) Rather much      (!) STRESS CONCERN      10/12/2024   Nutrition   Three or more servings of calcium each day? (!) NO   Diet: Low fat/cholesterol    Carbohydrate counting   How many servings of fruit and vegetables per day? (!) 2-3   How many sweetened beverages each day? 0-1       Multiple values from one day are sorted in reverse-chronological order         10/12/2024   Exercise   Days per week of moderate/strenous exercise 1 day   Average minutes spent exercising at this level 30 min      (!) EXERCISE CONCERN      10/12/2024   Social Factors   Frequency of gathering with friends or relatives Once a week   Worry food won't last until get money to buy more No   Food not last or not have enough money for food? No   Do you have housing? (Housing is defined as stable permanent housing and does not include staying ouside in a car, in a tent, in an abandoned building, in an overnight shelter, or couch-surfing.) Yes   Are you worried about losing your housing? No   Lack of transportation? No   Unable to get utilities (heat,electricity)? No            10/12/2024   Dental   Dentist two times every year? (!) NO             Today's PHQ-9 Score:       10/15/2024     3:41 PM   PHQ-9 SCORE   PHQ-9 Total Score MyChart 6 (Mild depression)   PHQ-9 Total Score 6         10/12/2024   Substance Use   Alcohol more than 3/day or more than 7/wk No   Do you use any other substances recreationally? No        Social History     Tobacco Use    Smoking status: Former     Types: Cigarettes     Start date: 1/1/2007    Smokeless tobacco: Never    Tobacco comments:     Very little use now, maybe once a month   Vaping Use    Vaping status: Never Used   Substance Use Topics    Alcohol  use: Yes     Types: 1 Glasses of wine, 1 Cans of beer per week     Comment: 2 drinks a week    Drug use: No             10/12/2024   Breast Cancer Screening   Family history of breast, colon, or ovarian cancer? No / Unknown         Mammogram Screening - Patient under 40 years of age: Routine Mammogram Screening not recommended.         10/12/2024   STI Screening   New sexual partner(s) since last STI/HIV test? No        History of abnormal Pap smear: No - age 30- 64 PAP with HPV every 3 years recommended        Latest Ref Rng & Units 10/10/2023     1:48 PM 10/27/2020     4:42 PM 8/4/2017    12:00 AM   PAP / HPV   PAP  Negative for Intraepithelial Lesion or Malignancy (NILM)      PAP (Historical)   NIL     HPV 16 DNA Negative Negative      HPV 18 DNA Negative Negative      Other HR HPV Negative Negative      PAP-ABSTRACT    See Scanned Document           This result is from an external source.          Reviewed and updated as needed this visit by Provider                    BP Readings from Last 3 Encounters:   10/15/24 128/80   07/29/24 138/82   10/10/23 138/82    Wt Readings from Last 3 Encounters:   10/15/24 94.8 kg (209 lb)   07/29/24 101 kg (222 lb 11.2 oz)   10/10/23 116.3 kg (256 lb 6 oz)                  Patient Active Problem List   Diagnosis    Hypertension, goal below 140/90    Class 1 obesity due to excess calories without serious comorbidity with body mass index (BMI) of 32.0 to 32.9 in adult    Segmental dysfunction of cervical region    Segmental dysfunction of thoracic region    Segmental dysfunction of lumbar region    Segmental dysfunction of sacral region    Sprain of ligaments of cervical spine    Anaphylaxis    Nonintractable episodic headache, unspecified headache type    Anxiety    Moderate episode of recurrent major depressive disorder (H)    CARDIOVASCULAR SCREENING; LDL GOAL LESS THAN 160     Past Surgical History:   Procedure Laterality Date    C/SECTION, LOW TRANSVERSE      2015, 2016      SECTION, TUBAL LIGATION, COMBINED Bilateral 10/25/2021    Procedure:  SECTION  with bilateral salpingectomy;  Surgeon: Rajinder Leblanc MD;  Location: [facility]    COMBINED  SECTION, SALPINGECTOMY BILATERAL Bilateral 10/25/2021    HC REMOVAL OF TONSILS,<13 Y/O         Social History     Tobacco Use    Smoking status: Former     Types: Cigarettes     Start date: 2007    Smokeless tobacco: Never    Tobacco comments:     Very little use now, maybe once a month   Substance Use Topics    Alcohol use: Yes     Types: 1 Glasses of wine, 1 Cans of beer per week     Comment: 2 drinks a week     Family History   Problem Relation Age of Onset    Hypertension Mother     Seizure Disorder Mother     Unknown/Adopted Mother     Hypertension Father     Heart Disease Father     Hyperlipidemia Father     Anxiety Disorder Father     No Known Problems Sister     No Known Problems Sister     Diabetes Paternal Grandmother     Brain Tumor Paternal Grandfather     Prostate Cancer Paternal Grandfather     No Known Problems Son     No Known Problems Son          Current Outpatient Medications   Medication Sig Dispense Refill    butalbital-acetaminophen-caffeine (ESGIC) -40 MG tablet Take 1 tablet by mouth every 4 hours as needed for headaches. 20 tablet 0    EPINEPHrine (AUVI-Q) 0.3 MG/0.3ML injection 2-pack Inject 0.3 mLs (0.3 mg) into the muscle as needed for anaphylaxis 2 each 1    losartan (COZAAR) 50 MG tablet Take 1 tablet (50 mg) by mouth daily. 90 tablet 3    Semaglutide-Weight Management (WEGOVY) 2.4 MG/0.75ML pen Inject 2.4 mg subcutaneously once a week. 3 mL 11    sertraline (ZOLOFT) 50 MG tablet Take 1 tablet (50 mg) by mouth daily. 90 tablet 3     Doing well with weight loss. She is following weight watchers diet and using semaglutide. Currently at max dose, no apparent side effects except constipation, which she manages with fiber gummies and water. Has lost almost 50 lbs in past year.  "    She hadn't been taking her losartan regularly for part of the summer due to housing changes. Now back on it for the past 2 weeks. Feels well. Not checking her BP.     They sold their house and bought land, but then developer pushed back their build date so they lived in fish house for part of summer, now moved in with father until spring when house will be built.   Also grandmother . 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 So a little stress. Doing ok on zoloft. Initially thought she might need more when all this was going on, but feels she pulled through and is grieving ok. Has good support. Keeping busy with kids.   Nonfasting today.   Using butalbital rarely.       Review of Systems  CONSTITUTIONAL: NEGATIVE for fever, chills, see above re: change in weight  INTEGUMENTARY/SKIN: NEGATIVE for worrisome rashes, moles or lesions  EYES: NEGATIVE for vision changes or irritation  ENT/MOUTH: NEGATIVE for ear, mouth and throat problems  RESP: NEGATIVE for significant cough or SOB  BREAST: NEGATIVE for masses, tenderness or discharge  CV: NEGATIVE for chest pain, palpitations or peripheral edema  GI: NEGATIVE for nausea, abdominal pain, heartburn, or change in bowel habits, see above.   : NEGATIVE for frequency, dysuria, or hematuria  Periods regular, monthly, not heavy.   MUSCULOSKELETAL: NEGATIVE for significant arthralgias or myalgia, doing ok with back and neck pain.   NEURO: NEGATIVE for weakness, dizziness or paresthesias  ENDOCRINE: NEGATIVE for temperature intolerance, skin/hair changes  HEME: NEGATIVE for bleeding problems  PSYCHIATRIC: see above.      Objective    Exam  /80   Pulse 86   Temp 97.7  F (36.5  C) (Temporal)   Resp 16   Ht 1.717 m (5' 7.6\")   Wt 94.8 kg (209 lb)   SpO2 99%   BMI 32.16 kg/m     Estimated body mass index is 32.16 kg/m  as calculated from the following:    Height as of this encounter: 1.717 m (5' 7.6\").    Weight as of this encounter: 94.8 kg (209 " lb).    Physical Exam  GENERAL: alert and no distress  EYES: Eyes grossly normal to inspection, PERRL and conjunctivae and sclerae normal  HENT: ear canals and TM's normal, nose and mouth without ulcers or lesions  NECK: no adenopathy, no asymmetry, masses, or scars, no bruits.   RESP: lungs clear to auscultation - no rales, rhonchi or wheezes  CV: regular rate and rhythm, normal S1 S2, no S3 or S4, no murmur, click or rub, no peripheral edema  Breasts: Visual inspection of the breasts is normal without skin changes.  Palpation reveals no obvious masses or nipple discharge.  No axillary masses.   ABDOMEN: soft, nontender, no hepatosplenomegaly, no masses and bowel sounds normal  MS: no gross musculoskeletal defects noted, no edema  SKIN: no suspicious lesions or rashes  NEURO: Normal strength and tone, mentation intact and speech normal  PSYCH: mentation appears normal, affect normal/bright    Results for orders placed or performed in visit on 10/15/24   Extra Tube     Status: None    Narrative    The following orders were created for panel order Extra Tube.  Procedure                               Abnormality         Status                     ---------                               -----------         ------                     Extra Purple Top Tube[[id number]]                            Final result                 Please view results for these tests on the individual orders.   Comprehensive metabolic panel (BMP + Alb, Alk Phos, ALT, AST, Total. Bili, TP)     Status: Normal   Result Value Ref Range    Sodium 136 135 - 145 mmol/L    Potassium 3.8 3.4 - 5.3 mmol/L    Carbon Dioxide (CO2) 24 22 - 29 mmol/L    Anion Gap 13 7 - 15 mmol/L    Urea Nitrogen 14.8 6.0 - 20.0 mg/dL    Creatinine 0.72 0.51 - 0.95 mg/dL    GFR Estimate >90 >60 mL/min/1.73m2    Calcium 9.5 8.8 - 10.4 mg/dL    Chloride 99 98 - 107 mmol/L    Glucose 84 70 - 99 mg/dL    Alkaline Phosphatase 45 40 - 150 U/L    AST 14 0 - 45 U/L    ALT 10 0 - 50 U/L     Protein Total 7.1 6.4 - 8.3 g/dL    Albumin 4.3 3.5 - 5.2 g/dL    Bilirubin Total 0.3 <=1.2 mg/dL   Extra Purple Top Tube     Status: None   Result Value Ref Range    Hold Specimen JIC         Signed Electronically by: Rhoda Redman MD    Answers submitted by the patient for this visit:  Patient Health Questionnaire (Submitted on 10/15/2024)  If you checked off any problems, how difficult have these problems made it for you to do your work, take care of things at home, or get along with other people?: Somewhat difficult  PHQ9 TOTAL SCORE: 6

## 2025-01-16 NOTE — TELEPHONE ENCOUNTER
I really wanted her to start the insulin unless she can stay over 80% in target without it. If she can adjust diet to hit that target then can stay off insulin, otherwise I recommend she take it.   Rhoda Redman MD    difficulty breathing

## 2025-01-24 ENCOUNTER — TELEPHONE (OUTPATIENT)
Dept: FAMILY MEDICINE | Facility: CLINIC | Age: 35
End: 2025-01-24
Payer: COMMERCIAL

## 2025-01-24 NOTE — TELEPHONE ENCOUNTER
Prior Authorization Retail Medication Request    Medication/Dose: Wegovy 2.4mg/0.75ml  Diagnosis and ICD code (if different than what is on RX):    New/renewal/insurance change PA/secondary ins. PA:  Previously Tried and Failed:    Rationale:      Insurance   Primary: Paid/[employer]  Insurance ID:  [id number]    Pharmacy Information (if different than what is on RX)  Name: [facility]  Phone: [phone]  Fax: [phone]    Clinic Information  Preferred routing pool for dept communication:      Thank you,   Samuel Barth, Pharmacy [facility]

## 2025-01-29 NOTE — TELEPHONE ENCOUNTER
Prior Authorization Approval    Authorization Effective Date: 12/30/2024  Authorization Expiration Date: 1/29/2026  Medication: Wegovy 2.4mg/0.75ml-APPROVED  Reference #:     Insurance Company: [employer] ([employer]) - Phone [phone] Fax [phone]  Which Pharmacy is filling the prescription (Not needed for infusion/clinic administered): [facility] [address]   Pharmacy Notified: Yes  Patient Notified: Instructed pharmacy to notify patient when script is ready to /ship.

## 2025-01-29 NOTE — TELEPHONE ENCOUNTER
Retail Pharmacy Prior Authorization Team   Phone: [phone]    PA Initiation    Medication: WEGOVY 2.4 MG/0.75ML SC SOAJ  Insurance Company: [employer] ([employer]) - Phone [phone] Fax [phone]  Pharmacy Filling the Rx: [address]   Filling Pharmacy Phone: [phone]  Filling Pharmacy Fax:    Start Date: 1/29/2025

## 2025-03-10 ENCOUNTER — OFFICE VISIT (OUTPATIENT)
Dept: FAMILY MEDICINE | Facility: CLINIC | Age: 35
End: 2025-03-10
Payer: COMMERCIAL

## 2025-03-10 VITALS
WEIGHT: 204 LBS | SYSTOLIC BLOOD PRESSURE: 122 MMHG | DIASTOLIC BLOOD PRESSURE: 80 MMHG | TEMPERATURE: 97.8 F | BODY MASS INDEX: 30.92 KG/M2 | HEIGHT: 68 IN | HEART RATE: 86 BPM | OXYGEN SATURATION: 99 % | RESPIRATION RATE: 16 BRPM

## 2025-03-10 DIAGNOSIS — I10 HYPERTENSION, GOAL BELOW 140/90: ICD-10-CM

## 2025-03-10 DIAGNOSIS — E66.811 CLASS 1 OBESITY DUE TO EXCESS CALORIES WITHOUT SERIOUS COMORBIDITY WITH BODY MASS INDEX (BMI) OF 31.0 TO 31.9 IN ADULT: Primary | ICD-10-CM

## 2025-03-10 DIAGNOSIS — E66.09 CLASS 1 OBESITY DUE TO EXCESS CALORIES WITHOUT SERIOUS COMORBIDITY WITH BODY MASS INDEX (BMI) OF 31.0 TO 31.9 IN ADULT: Primary | ICD-10-CM

## 2025-03-10 DIAGNOSIS — F33.1 MODERATE EPISODE OF RECURRENT MAJOR DEPRESSIVE DISORDER (H): ICD-10-CM

## 2025-03-10 PROBLEM — M99.02 SEGMENTAL DYSFUNCTION OF THORACIC REGION: Status: RESOLVED | Noted: 2019-08-02 | Resolved: 2025-03-10

## 2025-03-10 PROBLEM — S13.4XXA SPRAIN OF LIGAMENTS OF CERVICAL SPINE: Status: RESOLVED | Noted: 2019-08-02 | Resolved: 2025-03-10

## 2025-03-10 PROBLEM — M99.01 SEGMENTAL DYSFUNCTION OF CERVICAL REGION: Status: RESOLVED | Noted: 2019-08-02 | Resolved: 2025-03-10

## 2025-03-10 PROBLEM — M99.04 SEGMENTAL DYSFUNCTION OF SACRAL REGION: Status: RESOLVED | Noted: 2019-08-02 | Resolved: 2025-03-10

## 2025-03-10 PROBLEM — M99.03 SEGMENTAL DYSFUNCTION OF LUMBAR REGION: Status: RESOLVED | Noted: 2019-08-02 | Resolved: 2025-03-10

## 2025-03-10 PROCEDURE — 3074F SYST BP LT 130 MM HG: CPT | Performed by: PHYSICIAN ASSISTANT

## 2025-03-10 PROCEDURE — 3079F DIAST BP 80-89 MM HG: CPT | Performed by: PHYSICIAN ASSISTANT

## 2025-03-10 PROCEDURE — 99213 OFFICE O/P EST LOW 20 MIN: CPT | Performed by: PHYSICIAN ASSISTANT

## 2025-03-10 PROCEDURE — G2211 COMPLEX E/M VISIT ADD ON: HCPCS | Performed by: PHYSICIAN ASSISTANT

## 2025-03-10 PROCEDURE — 1126F AMNT PAIN NOTED NONE PRSNT: CPT | Performed by: PHYSICIAN ASSISTANT

## 2025-03-10 RX ORDER — LOSARTAN POTASSIUM 50 MG/1
25 TABLET ORAL DAILY
Qty: 90 TABLET | Refills: 3 | Status: SHIPPED | OUTPATIENT
Start: 2025-03-10

## 2025-03-10 ASSESSMENT — PAIN SCALES - GENERAL: PAINLEVEL_OUTOF10: NO PAIN (0)

## 2025-03-10 NOTE — PROGRESS NOTES
"  Devan Salazar is a 35 year old, presenting for the following health issues:  Establish Care and Follow Up (Weight loss)      3/10/2025     2:22 PM   Additional Questions   Roomed by Carol SIMENTAL     History of Present Illness       Hypertension: She presents for follow up of hypertension.  She does check blood pressure  regularly outside of the clinic. Outpatient blood pressures have not been over 140/90. She does not follow a low salt diet.     She eats 2-3 servings of fruits and vegetables daily.She consumes 1 sweetened beverage(s) daily.She exercises with enough effort to increase her heart rate 20 to 29 minutes per day.  She exercises with enough effort to increase her heart rate 3 or less days per week. She is missing 1 dose(s) of medications per week.  She is not taking prescribed medications regularly due to remembering to take.      Patient presents today to establish care.   - Has been using wegovy for weight loss and doing really well with this. Down more than 50 pounds. Has been a bit of a standstill recently but hoping with the nicer weather she will be able to get out and exercise more.   - Blood pressure doing really well. She does note more flushing occurring and we discussed cutting dose in half today and monitoring given her weight loss.   - anxiety continues to be well controlled with current dose of Zoloft.     Breaking ground on their home this July. Living with her dad for the time being. 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       Review of Systems  Constitutional, HEENT, cardiovascular, pulmonary, GI, , musculoskeletal, neuro, skin, endocrine and psych systems are negative, except as otherwise noted.      Objective    /80   Pulse 86   Temp 97.8  F (36.6  C) (Temporal)   Resp 16   Ht 1.717 m (5' 7.6\")   Wt 92.5 kg (204 lb)   SpO2 99%   BMI 31.39 kg/m    Body mass index is 31.39 kg/m .  Physical Exam   GENERAL: alert and no distress  RESP: lungs clear to " auscultation - no rales, rhonchi or wheezes  CV: regular rate and rhythm, normal S1 S2, no S3 or S4, no murmur, click or rub, no peripheral edema   MS: no gross musculoskeletal defects noted, no edema  SKIN: no suspicious lesions or rashes  PSYCH: mentation appears normal, affect normal/bright        Assessment & Plan     Class 1 obesity due to excess calories without serious comorbidity with body mass index (BMI) of 31.0 to 31.9 in adult  Patient has done excellent with weight loss thus far using wegovy but has hit a bit of a plateau. Hopeful this will improve with nicer weather and ability to incorporate more exercise. Will update me if this is not the cause.     Moderate episode of recurrent major depressive disorder (H)  Stable on current dose of Sertraline.     Hypertension, goal below 140/90  Will have patient cut dose in half and continue to monitor her blood pressure. Would ideally like to get off this.  - losartan (COZAAR) 50 MG tablet; Take 0.5 tablets (25 mg) by mouth daily.      The longitudinal plan of care for the diagnosis(es)/condition(s) as documented were addressed during this visit. Due to the added complexity in care, I will continue to support Marie in the subsequent management and with ongoing continuity of care.    Signed Electronically by: Violet Mcdermott PA-C

## 2025-07-15 ENCOUNTER — E-VISIT (OUTPATIENT)
Dept: URGENT CARE | Facility: CLINIC | Age: 35
End: 2025-07-15
Payer: COMMERCIAL

## 2025-07-15 ENCOUNTER — MYC MEDICAL ADVICE (OUTPATIENT)
Dept: FAMILY MEDICINE | Facility: CLINIC | Age: 35
End: 2025-07-15
Payer: COMMERCIAL

## 2025-07-15 DIAGNOSIS — E66.09 CLASS 1 OBESITY DUE TO EXCESS CALORIES WITHOUT SERIOUS COMORBIDITY WITH BODY MASS INDEX (BMI) OF 31.0 TO 31.9 IN ADULT: Primary | ICD-10-CM

## 2025-07-15 DIAGNOSIS — N39.0 ACUTE UTI (URINARY TRACT INFECTION): Primary | ICD-10-CM

## 2025-07-15 DIAGNOSIS — E66.811 CLASS 1 OBESITY DUE TO EXCESS CALORIES WITHOUT SERIOUS COMORBIDITY WITH BODY MASS INDEX (BMI) OF 31.0 TO 31.9 IN ADULT: Primary | ICD-10-CM

## 2025-07-15 RX ORDER — TOPIRAMATE 25 MG/1
50 TABLET, FILM COATED ORAL 2 TIMES DAILY
Qty: 180 TABLET | Refills: 1 | Status: SHIPPED | OUTPATIENT
Start: 2025-07-15

## 2025-07-15 RX ORDER — NITROFURANTOIN 25; 75 MG/1; MG/1
100 CAPSULE ORAL 2 TIMES DAILY
Qty: 10 CAPSULE | Refills: 0 | Status: SHIPPED | OUTPATIENT
Start: 2025-07-15 | End: 2025-07-20

## 2025-07-15 NOTE — TELEPHONE ENCOUNTER
Last OV 03/10/25. PCP advised to update if hitting plateau.     Baudilio Cartagena RN on 7/15/2025 at 7:54 AM

## 2025-07-15 NOTE — PATIENT INSTRUCTIONS
Dear Marie Yeboah    After reviewing your responses, I've been able to diagnose you with a urinary tract infection, which is a common infection of the bladder with bacteria.  This is not a sexually transmitted infection, though urinating immediately after intercourse can help prevent infections.  Drinking lots of fluids is also helpful to clear your current infection and prevent the next one.      I have sent a prescription for antibiotics to your pharmacy to treat this infection.    It is important that you take all of your prescribed medication even if your symptoms are improving after a few doses.  Taking all of your medicine helps prevent the symptoms from returning.     If your symptoms worsen, you develop pain in your back or stomach, develop fevers, or are not improving in 5 days, please contact your primary care provider for an appointment or visit any of our convenient Walk-in or Urgent Care Centers to be seen, which can be found on our website here.    Thanks again for choosing us as your health care partner,    Dianelys Taylor PA-C

## (undated) DEVICE — ESU GROUND PAD UNIVERSAL W/O CORD

## (undated) DEVICE — SUCTION MANIFOLD NEPTUNE 2 SYS 4 PORT 0702-020-000

## (undated) DEVICE — PACK C-SECTION

## (undated) DEVICE — SOL NACL 0.9% IRRIG 1000ML BOTTLE 07138-09

## (undated) DEVICE — Device

## (undated) DEVICE — STOCKING SLEEVE COMPRESSION CALF MED

## (undated) DEVICE — ESU PENCIL SMOKE EVAC W/ROCKER SWITCH 0703-047-000

## (undated) DEVICE — GLOVE PROTEXIS W/NEU-THERA 7.5  2D73TE75

## (undated) DEVICE — SU MONOCRYL 3-0 KS 27" UND Y523H

## (undated) DEVICE — SOL WATER IRRIG 1000ML BOTTLE 2F7114

## (undated) DEVICE — CATH TRAY FOLEY 16FR DRAINAGE BAG STATLOCK 899916

## (undated) DEVICE — RETR PANNICULUS TRAXI FOR PT POSITIONING PRS-1030

## (undated) DEVICE — PREP CHLORAPREP 26ML TINTED ORANGE  260815

## (undated) DEVICE — SU VICRYL 3-0 CP-2 27" UND J868H

## (undated) DEVICE — SU PLAIN 0 FN-2 27" N864H

## (undated) DEVICE — ESU LIGASURE OPEN SEALER/DIVIDER SM JAW 16.5MM LF1212A

## (undated) RX ORDER — FENTANYL CITRATE 50 UG/ML
INJECTION, SOLUTION INTRAMUSCULAR; INTRAVENOUS
Status: DISPENSED
Start: 2021-10-25

## (undated) RX ORDER — ONDANSETRON 2 MG/ML
INJECTION INTRAMUSCULAR; INTRAVENOUS
Status: DISPENSED
Start: 2021-10-25

## (undated) RX ORDER — FENTANYL CITRATE-0.9 % NACL/PF 10 MCG/ML
PLASTIC BAG, INJECTION (ML) INTRAVENOUS
Status: DISPENSED
Start: 2021-10-25

## (undated) RX ORDER — BUPIVACAINE HYDROCHLORIDE 2.5 MG/ML
INJECTION, SOLUTION EPIDURAL; INFILTRATION; INTRACAUDAL
Status: DISPENSED
Start: 2021-10-25

## (undated) RX ORDER — OXYTOCIN/0.9 % SODIUM CHLORIDE 30/500 ML
PLASTIC BAG, INJECTION (ML) INTRAVENOUS
Status: DISPENSED
Start: 2021-10-25

## (undated) RX ORDER — MORPHINE SULFATE 1 MG/ML
INJECTION, SOLUTION EPIDURAL; INTRATHECAL; INTRAVENOUS
Status: DISPENSED
Start: 2021-10-25